# Patient Record
Sex: FEMALE | Race: WHITE | NOT HISPANIC OR LATINO | Employment: STUDENT | URBAN - METROPOLITAN AREA
[De-identification: names, ages, dates, MRNs, and addresses within clinical notes are randomized per-mention and may not be internally consistent; named-entity substitution may affect disease eponyms.]

---
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
## 2020-10-05 ENCOUNTER — PATIENT MESSAGE (OUTPATIENT)
Dept: ADMINISTRATIVE | Facility: HOSPITAL | Age: 20
End: 2020-10-05

## 2021-01-04 ENCOUNTER — PATIENT MESSAGE (OUTPATIENT)
Dept: ADMINISTRATIVE | Facility: HOSPITAL | Age: 21
End: 2021-01-04

## 2021-04-05 ENCOUNTER — PATIENT MESSAGE (OUTPATIENT)
Dept: ADMINISTRATIVE | Facility: HOSPITAL | Age: 21
End: 2021-04-05

## 2021-07-07 ENCOUNTER — PATIENT MESSAGE (OUTPATIENT)
Dept: ADMINISTRATIVE | Facility: HOSPITAL | Age: 21
End: 2021-07-07

## 2021-08-20 ENCOUNTER — LAB VISIT (OUTPATIENT)
Dept: PRIMARY CARE CLINIC | Facility: OTHER | Age: 21
End: 2021-08-20
Payer: OTHER GOVERNMENT

## 2021-08-20 DIAGNOSIS — R05.9 COUGH: ICD-10-CM

## 2021-08-20 PROCEDURE — U0003 INFECTIOUS AGENT DETECTION BY NUCLEIC ACID (DNA OR RNA); SEVERE ACUTE RESPIRATORY SYNDROME CORONAVIRUS 2 (SARS-COV-2) (CORONAVIRUS DISEASE [COVID-19]), AMPLIFIED PROBE TECHNIQUE, MAKING USE OF HIGH THROUGHPUT TECHNOLOGIES AS DESCRIBED BY CMS-2020-01-R: HCPCS | Performed by: INTERNAL MEDICINE

## 2021-08-23 LAB
SARS-COV-2 RNA RESP QL NAA+PROBE: NOT DETECTED
SARS-COV-2- CYCLE NUMBER: NORMAL

## 2021-10-04 ENCOUNTER — PATIENT MESSAGE (OUTPATIENT)
Dept: ADMINISTRATIVE | Facility: HOSPITAL | Age: 21
End: 2021-10-04

## 2022-11-21 ENCOUNTER — TELEPHONE (OUTPATIENT)
Dept: FAMILY MEDICINE | Facility: CLINIC | Age: 22
End: 2022-11-21
Payer: COMMERCIAL

## 2022-11-21 NOTE — TELEPHONE ENCOUNTER
----- Message from Federica Glover sent at 11/21/2022  3:47 PM CST -----  Regarding: Patient Call Back  .Type: Patient Call Back    Who called: self     What is the request in detail: would like to establish care with Dr Delacruz- nothing in epic shows appts. Wife sees physician and would like to establish with same dr.  Please call  - sore throat as well and would like to be seen     Can the clinic reply by [facility]?     Would the patient rather a call back or a response via [facility]?  Call     Best call back number: [phone]

## 2022-11-23 ENCOUNTER — OFFICE VISIT (OUTPATIENT)
Dept: FAMILY MEDICINE | Facility: CLINIC | Age: 22
End: 2022-11-23
Payer: OTHER GOVERNMENT

## 2022-11-23 DIAGNOSIS — J02.9 SORE THROAT: ICD-10-CM

## 2022-11-23 DIAGNOSIS — Z76.89 ESTABLISHING CARE WITH NEW DOCTOR, ENCOUNTER FOR: Primary | ICD-10-CM

## 2022-11-23 DIAGNOSIS — R09.81 SINUS CONGESTION: ICD-10-CM

## 2022-11-23 PROCEDURE — 99203 OFFICE O/P NEW LOW 30 MIN: CPT | Mod: 95,,, | Performed by: INTERNAL MEDICINE

## 2022-11-23 PROCEDURE — 99203 PR OFFICE/OUTPT VISIT, NEW, LEVL III, 30-44 MIN: ICD-10-PCS | Mod: 95,,, | Performed by: INTERNAL MEDICINE

## 2022-11-23 NOTE — PROGRESS NOTES
Patient ID: Ewa Welch is a pleasant 22 y.o. White female.    Chief Complaint: Establish Care and Sore Throat      Patient is a new pt to me and to our practice.     This visit is a Telemedicine Video Visit due to the COVID-19 epidemics.    The patient location is: [place]  The chief complaint leading to consultation is: sore throat and establish care    Visit type: audiovisual    Face to Face time with patient: 10 min   20 minutes of total time spent on the encounter, which includes face to face time and non-face to face time preparing to see the patient (eg, review of tests), Obtaining and/or reviewing separately obtained history, Documenting clinical information in the electronic or other health record, Independently interpreting results (not separately reported) and communicating results to the patient/family/caregiver, or Care coordination (not separately reported).         Each patient to whom he or she provides medical services by telemedicine is:  (1) informed of the relationship between the physician and patient and the respective role of any other health care provider with respect to management of the patient; and (2) notified that he or she may decline to receive medical services by telemedicine and may withdraw from such care at any time.    HPI     Comes today to establish care with a new PCP, her wife is my patient too and referred her to my practice.    She has been in the [employer] for the last 2 years and now is back to [place].    She reports being globally healthy, however yesterday she was seen at an Urgent Care for sinus infection.  She also had a sore throat.  She was provided with Z-Errol and Flonase, she feels pretty well today.    She has no fever, no shortness of breath, no chest pain, she tested negative for COVID and flu.      Patient Active Problem List   Diagnosis    Seasonal allergic rhinitis due to pollen    Chlamydia infection        Review of Systems   Constitutional:  Negative for  activity change and unexpected weight change.   HENT:  Positive for congestion (better) and sore throat. Negative for hearing loss and rhinorrhea.    Eyes:  Negative for discharge and visual disturbance.   Respiratory:  Negative for chest tightness and wheezing.    Cardiovascular:  Negative for chest pain and palpitations.   Gastrointestinal:  Negative for blood in stool, constipation, diarrhea and vomiting.   Endocrine: Negative for polydipsia and polyuria.   Genitourinary:  Negative for difficulty urinating, dysuria, hematuria and menstrual problem.   Musculoskeletal:  Negative for arthralgias and joint swelling.   Neurological:  Negative for weakness and headaches.   Psychiatric/Behavioral:  Negative for confusion and dysphoric mood.      Objective:      Physical Exam    There were no vitals filed for this visit.  There is no height or weight on file to calculate BMI.    Pleasant, smiling, NAD.    RESULTS: Reviewed labs from last 12 months    Last Lab Results:     Lab Results   Component Value Date    WBC 6.08 12/21/2018    HGB 12.7 12/21/2018    HCT 38.4 12/21/2018     12/21/2018     12/21/2018    K 3.8 12/21/2018     12/21/2018    CO2 25 12/21/2018    BUN 10 12/21/2018    CREATININE 0.8 12/21/2018    CALCIUM 9.9 12/21/2018    ALBUMIN 4.9 (H) 12/21/2018    AST 12 12/21/2018    ALT 9 (L) 12/21/2018    CHOL 142 12/21/2018    TRIG 55 12/21/2018    HDL 52 12/21/2018    LDLCALC 79.0 12/21/2018    HGBA1C 4.5 12/21/2018    TSH 1.774 12/21/2018       Assessment:       1. Establishing care with new doctor, encounter for    2. Sore throat    3. Sinus congestion        Plan:   Ewa was seen today for establish care and sore throat.    Diagnoses and all orders for this visit:    Establishing care with new doctor, encounter for    Discussed the importance of a good pt-doctor trust relationship. Will need an OV to do a full physical. Pt will schedule this.    Sore throat    See HPI. Better.    Sinus  congestion    See HPI. Better.    No follow-ups on file.    This note was created by combination of typed  and M-Modal dictation.  Transcription errors may be present.  If there are any questions, please contact me.

## 2023-04-11 ENCOUNTER — TELEPHONE (OUTPATIENT)
Dept: FAMILY MEDICINE | Facility: CLINIC | Age: 23
End: 2023-04-11

## 2023-04-11 DIAGNOSIS — M25.551 PAIN OF RIGHT HIP: Primary | ICD-10-CM

## 2023-04-11 DIAGNOSIS — G89.29 CHRONIC RIGHT HIP PAIN: Primary | ICD-10-CM

## 2023-04-11 DIAGNOSIS — M25.551 CHRONIC RIGHT HIP PAIN: Primary | ICD-10-CM

## 2023-04-11 NOTE — TELEPHONE ENCOUNTER
Right hip pain, no trauma. But it is a chronic issue over the past few years, states it's reason she was d/c from the [employer].

## 2023-04-11 NOTE — TELEPHONE ENCOUNTER
Requesting referral to Ortho due to R hip pain that has been bothering her for some time, reason why she was D/C from [employer]. Referral placed

## 2023-04-11 NOTE — TELEPHONE ENCOUNTER
----- Message from Tika Delacruz MD sent at 4/11/2023 11:59 AM CDT -----  Regarding: RE: Referral  Need to know more about hip issue. R or L? Initial trauma? Thanks    ----- Message -----  From: Miroslava Frausto  Sent: 4/10/2023   2:07 PM CDT  To: Tika Delacruz MD  Subject: Referral                                         .Type:  Patient Requesting Referral    Who Called:self    Referral to What Specialty: Hip     Reason for Referral:hip issues since      Does the patient want the referral with a specific physician?: no    [facility] the [facility]    Would the patient rather a call back or a response via [facility]? Call     Best Call Back Number: [phone]     Additional Information:

## 2023-04-13 ENCOUNTER — OFFICE VISIT (OUTPATIENT)
Dept: ORTHOPEDICS | Facility: CLINIC | Age: 23
End: 2023-04-13
Payer: OTHER GOVERNMENT

## 2023-04-13 VITALS — BODY MASS INDEX: 18.96 KG/M2 | HEIGHT: 66 IN | WEIGHT: 118 LBS

## 2023-04-13 DIAGNOSIS — G89.29 CHRONIC RIGHT HIP PAIN: ICD-10-CM

## 2023-04-13 DIAGNOSIS — S73.191A TEAR OF RIGHT ACETABULAR LABRUM, INITIAL ENCOUNTER: ICD-10-CM

## 2023-04-13 DIAGNOSIS — M25.551 CHRONIC RIGHT HIP PAIN: ICD-10-CM

## 2023-04-13 DIAGNOSIS — M25.551 PAIN OF RIGHT HIP: Primary | ICD-10-CM

## 2023-04-13 PROCEDURE — 99999 PR PBB SHADOW E&M-EST. PATIENT-LVL III: ICD-10-PCS | Mod: PBBFAC,,, | Performed by: ORTHOPAEDIC SURGERY

## 2023-04-13 PROCEDURE — 99203 OFFICE O/P NEW LOW 30 MIN: CPT | Mod: S$PBB,,, | Performed by: ORTHOPAEDIC SURGERY

## 2023-04-13 PROCEDURE — 99203 PR OFFICE/OUTPT VISIT, NEW, LEVL III, 30-44 MIN: ICD-10-PCS | Mod: S$PBB,,, | Performed by: ORTHOPAEDIC SURGERY

## 2023-04-13 PROCEDURE — 99213 OFFICE O/P EST LOW 20 MIN: CPT | Mod: PBBFAC,PN | Performed by: ORTHOPAEDIC SURGERY

## 2023-04-13 PROCEDURE — 99999 PR PBB SHADOW E&M-EST. PATIENT-LVL III: CPT | Mod: PBBFAC,,, | Performed by: ORTHOPAEDIC SURGERY

## 2023-04-13 NOTE — PROGRESS NOTES
NEW PATIENT ORTHOPAEDIC: HIP    PRIMARY CARE PHYSICIAN: Tika Delacruz MD   REFERRING PROVIDER: Tika Delacruz MD  [address] [place] [address]     ASSESSMENT & PLAN:    Impression:  Right Hip Impingement  Right Hip Labral Tear    Follow Up Plan: After MRI    Non operative care:    Ewa Welch has physical exam evidence of above and wishes to pursue an non-operative care. I am recommending the following: MRI Hip    Patient comes in with a long history of having right hip pain.  She is had this pain for over 3 years.  It is led to her getting discharged from the [employer].  She had extensive workup prior to this discharge from active service.  She reports that she had MRIs, MRI arthrograms, lateral based hip injections, physical therapy, athletic trainers, anti-inflammatories without official diagnosis or improvement in her pains.  The pain she describes is a groin and posterior gluteal pain in a C sign distribution.  The pain is positional and made worse with internal rotation and flexion in his relieved with external rotation.  She also has pain over her iliopsoas tendon and pain with resisted hip flexion.  These pains are debilitating she has not inability to walk or run without pain in her hip.  She was unable to seek care earlier after  discharge secondary to lack of insurance.  Her radiographs are not consistent with any focal abnormality.  I think best next step is to reimage her hip with an MRI to eval for impingement signs although she does not have pincer lesion or cam deformity on radiographs.  She acts like she has a labral tear and I think it is best to evaluate this with MRI.  Pending that MRI she could consider diagnostic and therapeutic intra-articular injection which she is not tried previously.  Could also consider referral to sports medicine for consideration of hip arthroscopy.    The patient has been ordered:  MRI (Criteria for MRI - xray is  equivocal)    CONSULTS:   None    ACTIVE PROBLEM LIST  Patient Active Problem List   Diagnosis    Seasonal allergic rhinitis due to pollen    Chlamydia infection        SUBJECTIVE    CHIEF COMPLAINT: Hip Pain    HPI:   Ewa Welch is a 22 y.o. female here for evaluation and management of right hip pain. There is not a specific incident that brought about this pain. she has had progressive problems with the hip(s) starting 3 years ago and is progressing which is now interfering with activities which include: walking, functional household ADL's, participating in family activities, enjoying hobbies, exercise, rising from a sitting position, standing for prolonged periods of time, getting in and out of a car, and climbing stairs     Currently the pain in the joint is moderate with activity.  The pain is intermittent and is located in buttocks and groin.  The pain is described as aching, severe, sharp, and shooting . Relieving factors include rest, ice or heat, prescription medication, and repositioning. yes associated Catching, Clicking, and Popping.     They do not report leg length inequality.     Ewa Welch has no additional complaints.     PROGRESSIVE SYMPTOMS:  Pain impacting work  Pain worsened by weight bearing  Pain effecting living situation  Unable to ambulate a significant distance without pain    FUNCTIONAL STATUS:   Climb a flight of stairs or walk up a hill     PREVIOUS TREATMENTS:  Medical: RX NSAIDS, Steroid Injections, and Tylenol  Physical Therapy: Activities Modified , Formal Physical Therapy for 6 weeks, and Home Exercise Program  Previous Orthopaedic Surgery: None    REVIEW OF SYSTEMS:  PAIN ASSESSMENT:  See HPI.  MUSCULOSKELETAL: See HPI.  OTHER 10 point review of systems is negative except as stated in HPI above    PAST MEDICAL HISTORY   has a past medical history of Generalized anxiety disorder (03/29/2016), Infected pilonidal cyst (4/5/2017), Seasonal allergic rhinitis due to pollen (4/4/2013),  "Self-harming behavior, and Tonsillar hypertrophy (2/12/2018).     PAST SURGICAL HISTORY   has a past surgical history that includes OTHER SURGICAL HISTORY (Left); Tonsillectomy (02/12/2018); Adenoidectomy (02/12/2018); and Ridge tooth extraction (Bilateral).     FAMILY HISTORY  family history includes Allergies in her mother; Cancer in her paternal grandmother; Cirrhosis in her paternal grandmother; Eczema in her sister; No Known Problems in her father.     SOCIAL HISTORY   reports that she has never smoked. She has never used smokeless tobacco. She reports current alcohol use. She reports that she does not use drugs.     ALLERGIES   Review of patient's allergies indicates:   Allergen Reactions    Latex, natural rubber Rash        MEDICATIONS   Current Outpatient Medications on File Prior to Visit   Medication Sig Dispense Refill    fluticasone (FLONASE) 50 mcg/actuation nasal spray 1 spray (50 mcg total) by Each Nare route once daily. 16 g 11    loratadine (CLARITIN) 10 mg tablet Take 1 tablet (10 mg total) by mouth once daily. (Patient taking differently: Take 10 mg by mouth daily as needed. ) 30 tablet 3     No current facility-administered medications on file prior to visit.          PHYSICAL EXAM   height is 5' 6" (1.676 m) and weight is 53.5 kg (118 lb).   Body mass index is 19.05 kg/m².      All other systems deferred.  GENERAL:  No acute distress  HABITUS: Normal  GAIT: Non-antalgic  SKIN: Normal     HIP EXAM:    right:   ROM:   Flexion: 120 degrees    Internal Rotation: 30 degrees    External Rotation: 50 degrees    Abduction: 45 degrees    Adduction: 20 degrees  No apparent leg length discrepancy    Palpation: yes tenderness over Ilioposas tendon   Pain is reproduced with IR and not ER of the hip  Positive FADIR  Pain with resisted hip flexion  Strength: 4/5 hip flexion, abduction, 5/5 knee flexion and extension   Straight leg raise: Negative   Neurovascular Status: Sensation intact to light touch in " Sural, saphenous, SPN, DPN, Tibial nerve distribution  2+ pulse DP/PT, normal capillary refill, foot has normal warmth    DATA:  Diagnostic tests reviewed for today's visit:    Hip radiographs appears normal. No evidence of fracture, osseous abnormalities, or significant degenerative changes.

## 2023-04-15 ENCOUNTER — HOSPITAL ENCOUNTER (OUTPATIENT)
Dept: RADIOLOGY | Facility: OTHER | Age: 23
Discharge: HOME | End: 2023-04-15
Attending: ORTHOPAEDIC SURGERY
Payer: OTHER GOVERNMENT

## 2023-04-15 DIAGNOSIS — S73.191A TEAR OF RIGHT ACETABULAR LABRUM, INITIAL ENCOUNTER: ICD-10-CM

## 2023-04-15 PROCEDURE — 73721 MRI JNT OF LWR EXTRE W/O DYE: CPT | Mod: TC,RT

## 2023-04-15 PROCEDURE — 73721 MRI HIP WITHOUT CONTRAST RIGHT: ICD-10-PCS | Mod: 26,RT,, | Performed by: RADIOLOGY

## 2023-04-15 PROCEDURE — 73721 MRI JNT OF LWR EXTRE W/O DYE: CPT | Mod: 26,RT,, | Performed by: RADIOLOGY

## 2023-04-18 ENCOUNTER — PATIENT MESSAGE (OUTPATIENT)
Dept: ORTHOPEDICS | Facility: CLINIC | Age: 23
End: 2023-04-18
Payer: OTHER GOVERNMENT

## 2023-04-18 DIAGNOSIS — S73.191A TEAR OF RIGHT ACETABULAR LABRUM, INITIAL ENCOUNTER: Primary | ICD-10-CM

## 2023-04-19 ENCOUNTER — TELEPHONE (OUTPATIENT)
Dept: SPORTS MEDICINE | Facility: CLINIC | Age: 23
End: 2023-04-19
Payer: OTHER GOVERNMENT

## 2023-04-27 ENCOUNTER — OFFICE VISIT (OUTPATIENT)
Dept: SPORTS MEDICINE | Facility: CLINIC | Age: 23
End: 2023-04-27
Payer: OTHER GOVERNMENT

## 2023-04-27 ENCOUNTER — HOSPITAL ENCOUNTER (OUTPATIENT)
Dept: RADIOLOGY | Facility: HOSPITAL | Age: 23
Discharge: HOME | End: 2023-04-27
Attending: STUDENT IN AN ORGANIZED HEALTH CARE EDUCATION/TRAINING PROGRAM
Payer: OTHER GOVERNMENT

## 2023-04-27 VITALS
HEART RATE: 74 BPM | BODY MASS INDEX: 21.86 KG/M2 | HEIGHT: 66 IN | DIASTOLIC BLOOD PRESSURE: 73 MMHG | WEIGHT: 136 LBS | SYSTOLIC BLOOD PRESSURE: 127 MMHG

## 2023-04-27 DIAGNOSIS — S73.191A TEAR OF RIGHT ACETABULAR LABRUM, INITIAL ENCOUNTER: ICD-10-CM

## 2023-04-27 DIAGNOSIS — S73.191A TEAR OF RIGHT ACETABULAR LABRUM, INITIAL ENCOUNTER: Primary | ICD-10-CM

## 2023-04-27 DIAGNOSIS — M76.31 ILIOTIBIAL BAND SYNDROME OF RIGHT SIDE: ICD-10-CM

## 2023-04-27 DIAGNOSIS — M25.851 FEMOROACETABULAR IMPINGEMENT OF RIGHT HIP: ICD-10-CM

## 2023-04-27 PROCEDURE — 99999 PR PBB SHADOW E&M-EST. PATIENT-LVL IV: CPT | Mod: PBBFAC,,, | Performed by: STUDENT IN AN ORGANIZED HEALTH CARE EDUCATION/TRAINING PROGRAM

## 2023-04-27 PROCEDURE — 99214 OFFICE O/P EST MOD 30 MIN: CPT | Mod: PBBFAC,27,25 | Performed by: STUDENT IN AN ORGANIZED HEALTH CARE EDUCATION/TRAINING PROGRAM

## 2023-04-27 PROCEDURE — 20611 DRAIN/INJ JOINT/BURSA W/US: CPT | Mod: PBBFAC | Performed by: STUDENT IN AN ORGANIZED HEALTH CARE EDUCATION/TRAINING PROGRAM

## 2023-04-27 PROCEDURE — 99204 PR OFFICE/OUTPT VISIT, NEW, LEVL IV, 45-59 MIN: ICD-10-PCS | Mod: S$PBB,,, | Performed by: STUDENT IN AN ORGANIZED HEALTH CARE EDUCATION/TRAINING PROGRAM

## 2023-04-27 PROCEDURE — 99999 PR PBB SHADOW E&M-EST. PATIENT-LVL II: CPT | Mod: PBBFAC,,, | Performed by: STUDENT IN AN ORGANIZED HEALTH CARE EDUCATION/TRAINING PROGRAM

## 2023-04-27 PROCEDURE — 99499 NO LOS: ICD-10-PCS | Mod: S$PBB,,, | Performed by: STUDENT IN AN ORGANIZED HEALTH CARE EDUCATION/TRAINING PROGRAM

## 2023-04-27 PROCEDURE — 73502 X-RAY EXAM HIP UNI 2-3 VIEWS: CPT | Mod: 26,RT,, | Performed by: RADIOLOGY

## 2023-04-27 PROCEDURE — 99212 OFFICE O/P EST SF 10 MIN: CPT | Mod: PBBFAC,25 | Performed by: STUDENT IN AN ORGANIZED HEALTH CARE EDUCATION/TRAINING PROGRAM

## 2023-04-27 PROCEDURE — 99499 UNLISTED E&M SERVICE: CPT | Mod: S$PBB,,, | Performed by: STUDENT IN AN ORGANIZED HEALTH CARE EDUCATION/TRAINING PROGRAM

## 2023-04-27 PROCEDURE — 20611 LARGE JOINT ASPIRATION/INJECTION: R HIP JOINT: ICD-10-PCS | Mod: S$PBB,RT,, | Performed by: STUDENT IN AN ORGANIZED HEALTH CARE EDUCATION/TRAINING PROGRAM

## 2023-04-27 PROCEDURE — 99499 NO LOS: ICD-10-PCS | Mod: S$PBB,,, | Performed by: FAMILY MEDICINE

## 2023-04-27 PROCEDURE — 99999 PR PBB SHADOW E&M-EST. PATIENT-LVL II: ICD-10-PCS | Mod: PBBFAC,,, | Performed by: STUDENT IN AN ORGANIZED HEALTH CARE EDUCATION/TRAINING PROGRAM

## 2023-04-27 PROCEDURE — 73502 XR HIP WITH PELVIS WHEN PERFORMED, 2 OR 3  VIEWS RIGHT: ICD-10-PCS | Mod: 26,RT,, | Performed by: RADIOLOGY

## 2023-04-27 PROCEDURE — 99999 PR PBB SHADOW E&M-EST. PATIENT-LVL IV: ICD-10-PCS | Mod: PBBFAC,,, | Performed by: STUDENT IN AN ORGANIZED HEALTH CARE EDUCATION/TRAINING PROGRAM

## 2023-04-27 PROCEDURE — 73502 X-RAY EXAM HIP UNI 2-3 VIEWS: CPT | Mod: TC,RT

## 2023-04-27 PROCEDURE — 99499 UNLISTED E&M SERVICE: CPT | Mod: S$PBB,,, | Performed by: FAMILY MEDICINE

## 2023-04-27 PROCEDURE — 99204 OFFICE O/P NEW MOD 45 MIN: CPT | Mod: S$PBB,,, | Performed by: STUDENT IN AN ORGANIZED HEALTH CARE EDUCATION/TRAINING PROGRAM

## 2023-04-27 NOTE — PROGRESS NOTES
Subjective:          Chief Complaint: Ewa Welch is a 22 y.o. female who had concerns including Pain of the Right Hip.    Ewa Welch is a 22 y.o. female [employer] and former [employer] that presents for evaluation for her right hip pain. She is referred by Dr. Hubbard. She states that her pain started about 3-4 years ago while training in the .  There is no specific inciting injury, event, or trauma.  She rates her pain as a 9/10 at worst over the anterior groin as well as the lateral aspect of the right hip. She endorses a positive C-sign.  The pain increases with hip flexion.  She reports having catching and popping sensations in the hip after long periods of sitting. She has completed multiple courses of physical therapy while in the  with no relief. She also states that she has received an injection to the lateral aspect of the hip with some relief.     Past Medical History:   Diagnosis Date    Generalized anxiety disorder 03/29/2016    Infected pilonidal cyst 4/5/2017    Seasonal allergic rhinitis due to pollen 4/4/2013    Self-harming behavior     Tonsillar hypertrophy 2/12/2018       Current Outpatient Medications on File Prior to Visit   Medication Sig Dispense Refill    fluticasone (FLONASE) 50 mcg/actuation nasal spray 1 spray (50 mcg total) by Each Nare route once daily. 16 g 11    loratadine (CLARITIN) 10 mg tablet Take 1 tablet (10 mg total) by mouth once daily. (Patient taking differently: Take 10 mg by mouth daily as needed. ) 30 tablet 3     No current facility-administered medications on file prior to visit.       Past Surgical History:   Procedure Laterality Date    ADENOIDECTOMY  02/12/2018    Dr. Bernard    OTHER SURGICAL HISTORY Left     toenail removal big toe    TONSILLECTOMY  02/12/2018    Dr. Bernard    WISDOM TOOTH EXTRACTION Bilateral        Family History   Problem Relation Age of Onset    Allergies Mother     Eczema Sister     Cancer Paternal Grandmother      Cirrhosis Paternal Grandmother     No Known Problems Father        Social History     Socioeconomic History    Marital status:    Tobacco Use    Smoking status: Never    Smokeless tobacco: Never   Substance and Sexual Activity    Alcohol use: Yes     Comment: Occasional    Drug use: No    Sexual activity: Yes     Partners: Female, Male   Social History Narrative    Lives with mom, dad and sister Erika.    She is planning to go to the [place] next year.    Sexually relationship with female.              Review of Systems   Constitutional: Negative.   HENT: Negative.     Eyes: Negative.    Cardiovascular: Negative.    Respiratory: Negative.     Endocrine: Negative.    Hematologic/Lymphatic: Negative.    Skin: Negative.    Musculoskeletal:  Positive for joint pain (right hip) and muscle weakness. Negative for arthritis, falls, gout, joint swelling, muscle cramps, myalgias, neck pain and stiffness.   Neurological: Negative.    Psychiatric/Behavioral: Negative.     Allergic/Immunologic: Negative.                  Objective:        General: Ewa is well-developed, well-nourished, appears stated age, in no acute distress, alert and oriented to time, place and person.     General    Nursing note and vitals reviewed.  Constitutional: She is oriented to person, place, and time. She appears well-developed and well-nourished. No distress.   HENT:   Head: Normocephalic and atraumatic.   Nose: Nose normal.   Eyes: EOM are normal.   Cardiovascular:  Intact distal pulses.            Pulmonary/Chest: Effort normal. No respiratory distress.   Neurological: She is alert and oriented to person, place, and time.   Psychiatric: She has a normal mood and affect. Her behavior is normal. Judgment and thought content normal.           Right Knee Exam     Inspection   Alignment:  normal  Effusion: absent    Left Knee Exam     Inspection   Alignment:  normal  Effusion: absent    Right Hip Exam     Inspection   Scars:  absent  Swelling: absent  Bruising: absent  No deformity of hip.  Quadriceps Atrophy:  Negative  Erythema: absent    Tenderness   The patient tender to palpation of the trochanteric bursa and rectus insertion.    Range of Motion   Abduction:  45   Adduction:  30   Extension:  0   Flexion:  100   External rotation:  70   Internal rotation:  15     Tests   Pain w/ forced internal rotation (LIZETTE): absent  Pain w/ forced external rotation (FADIR): present  Cynthia: negative  Circumduction test: negative  Stinchfield test: positive  Log Roll: negative  Snapping Hip (internal): negative    Other   Sensation: normal  Left Hip Exam     Inspection   Scars: absent  Swelling: absent  No deformity of hip.  Quadriceps Atrophy:  negative  Erythema: absent  Bruising: absent    Range of Motion   Abduction:  45   Adduction:  30   Extension:  0   Flexion:  120   External rotation:  70   Internal rotation: 30     Tests   Pain w/ forced internal rotation (LIZETTE): absent  Pain w/ forced external rotation (FADIR): absent  Cynthia: negative  Trendelenburg Test: negative  Circumduction test: negative  Stinchfield test: negative  Log Roll: negative  Snapping Hip (internal): negative    Other   Sensation: normal          Muscle Strength   Right Lower Extremity   Hip Abduction: 4/5   Hip Adduction: 5/5   Hip Flexion: 4/5   Ankle Dorsiflexion:  5/5   Left Lower Extremity   Hip Abduction: 5/5   Hip Adduction: 5/5   Hip Flexion: 5/5   Ankle Dorsiflexion:  5/5     Vascular Exam     Right Pulses  Dorsalis Pedis:      2+  Posterior Tibial:      2+        Left Pulses  Dorsalis Pedis:      2+  Posterior Tibial:      2+        Capillary Refill  Left Hand: normal capillary refill        Edema  Right Upper Leg: absent  Left Upper Leg: absent      Imaging:  X-rays of the right hip from 04/27/2023 personally viewed by me on that day.  These include AP pelvis, AP right hip, and bilateral modified Grigsby.  Bilateral cam deformities.  There is no crossover sign,  retroversion, or dysplasia.  No arthritic changes.      MRI of the right hip from 04/15/2023 personally viewed by me 04/27/2023.  This is a non arthrogram study.  There is an acetabular labral tear in the anterior and superior aspect of the hip.  There is a cam impingement.  There is no cartilage damage.      Assessment:     Ewa Welch is a 22 y.o. female with right hip labral tear, femoroacetabular impingement, as well as IT band syndrome/trochanteric bursitis  Encounter Diagnoses   Name Primary?    Tear of right acetabular labrum, initial encounter Yes    Iliotibial band syndrome of right side     Femoroacetabular impingement of right hip           Plan:       The diagnosis and treatment options were discussed at length with the patient and all of her questions were answered.  I showed her the x-rays and the MRI and discuss the findings with her.  She is done an extensive course of physical therapy including a single trochanteric bursa injection which did provide some relief with the lateral pain but not the medial.  I recommended we do a diagnostic intra-articular hip injection today.  I will have Dr. Rudd perform this.  Additionally, we will refer to physical therapy to work on hip, core, and gluteal strengthening as well as stretching to the IT band including soft tissue modalities such as Graston and dry needling.  She is in agreement with this plan.  She will return to clinic in 6 weeks for repeat evaluation.  If she does not have significant improvement with physical therapy, but does receive some relief with the intra-articular injection, we can consider hip arthroscopy with labral repair and osteoplasty of the femur.      All of their questions were answered.  They will call the clinic with any questions or concerns in the interim.    Should the patient's symptoms worsen, persist, or fail to improve they should return for reevaluation and I would be happy to see them back anytime.        Leonard  TRISTIN Becker.    Please be aware that this note has been generated with the assistance of Kira voice-to-text.  Please excuse any spelling or grammatical errors.    Thank you for choosing Dr. Leonard Becker for your sports medicine care. It is our goal to provide you with exceptional care that will help keep you healthy, active, and get you back in the game.     If you felt that you received exemplary care today, please consider leaving feedback for Dr. Becker on [employer] at [email or web address].    Please do not hesitate to reach out to us via email, phone, or MyChart with any questions, concerns, or feedback.

## 2023-04-27 NOTE — PROCEDURES
Large Joint Aspiration/Injection: R hip joint    Date/Time: 4/27/2023 11:15 AM  Performed by: Lilly Rudd MD  Authorized by: Lilly Rudd MD     Consent Done?:  Yes (Verbal)  Indications:  Pain and diagnostic evaluation  Site marked: the procedure site was marked    Timeout: prior to procedure the correct patient, procedure, and site was verified    Prep: patient was prepped and draped in usual sterile fashion      Local anesthesia used?: Yes    Anesthesia:  Local infiltration  Local anesthetic:  Co-phenylcaine spray and lidocaine 2% without epinephrine  Anesthetic total (ml):  2.5      Details:  Needle Size:  22 G  Ultrasonic Guidance for needle placement?: Yes    Images are saved and documented.  Approach:  Anterior  Location:  Hip  Site:  R hip joint  Patient tolerance:  Patient tolerated the procedure well with no immediate complications     TECHNIQUE: Real time ultrasound examination of the right femoroacetabular joint (s) was performed with SonGogii Gameste Edge 2, C1-5 MHz probe(s). Ultrasound guidance was used for needle localization. Images were saved and stored for documentation. Dynamic visualization of the needle was continuous throughout the procedures and maintained in good position.

## 2023-04-27 NOTE — PROGRESS NOTES
CC: right hip pain    22 y.o. Female presents today for local injection of her right hip joint.       REVIEW OF SYSTEMS:   Constitution: Patient denies fever or chills.  Eyes: Patient denies eye pain or vision changes.  HEENT: Patient denies ear pain, sore throat, or nasal discharge.  CVS: Patient denies chest pain.  Lungs: Patient denies shortness of breath or cough.  Skin: Patient denies skin rash or itching.    Musculoskeletal: Patient denies recent falls. See HPI.  Psych: Patient denies any current anxiety or nervousness.    PAST MEDICAL HISTORY:   Past Medical History:   Diagnosis Date    Generalized anxiety disorder 03/29/2016    Infected pilonidal cyst 4/5/2017    Seasonal allergic rhinitis due to pollen 4/4/2013    Self-harming behavior     Tonsillar hypertrophy 2/12/2018       MEDICATIONS:     Current Outpatient Medications:     fluticasone (FLONASE) 50 mcg/actuation nasal spray, 1 spray (50 mcg total) by Each Nare route once daily., Disp: 16 g, Rfl: 11    loratadine (CLARITIN) 10 mg tablet, Take 1 tablet (10 mg total) by mouth once daily. (Patient taking differently: Take 10 mg by mouth daily as needed. ), Disp: 30 tablet, Rfl: 3    ALLERGIES:   Review of patient's allergies indicates:   Allergen Reactions    Latex, natural rubber Rash        PHYSICAL EXAMINATION:  There were no vitals taken for this visit.  There are no signs of infection at the injection site, including no rubor, calor, or skin lesions.  Gen: NAD.  Psych: Affect & judgment nl.  Neuro: Grossly CNI. ENRIQUEZ.  HEENT: -Trach dev. -Eye d/c.   CV: Color nl. -E/C/C. WWPx4.  Pulm: -Dyspnea. -Cough.  Lymph: -Edema.  Int: -Rash/lesion noted. Skin is warm and dry    ASSESSMENT:      ICD-10-CM ICD-9-CM   1. Tear of right acetabular labrum, initial encounter  S73.191A 843.8         PLAN:  Ultrasound-guided injection of the right hip joint with local anesthetic performed at visit today.    Risks and benefits were discussed with patient prior to receiving  injection.  Depending on injection type, risks include the possibility of infection, pain, disruptions in blood pressure and blood sugar, and cosmetic deformity at site of injection.    All questions were answered to the best of my ability and all concerns were addressed at this time.    This note is dictated using the M*Modal Fluency Direct word recognition program. There are word recognition mistakes that are occasionally missed on review.

## 2023-05-05 ENCOUNTER — CLINICAL SUPPORT (OUTPATIENT)
Dept: REHABILITATION | Facility: HOSPITAL | Age: 23
End: 2023-05-05
Attending: STUDENT IN AN ORGANIZED HEALTH CARE EDUCATION/TRAINING PROGRAM
Payer: OTHER GOVERNMENT

## 2023-05-05 DIAGNOSIS — S73.191A TEAR OF RIGHT ACETABULAR LABRUM, INITIAL ENCOUNTER: ICD-10-CM

## 2023-05-05 DIAGNOSIS — M76.31 ILIOTIBIAL BAND SYNDROME OF RIGHT SIDE: ICD-10-CM

## 2023-05-05 DIAGNOSIS — M25.551 RIGHT HIP PAIN: Primary | ICD-10-CM

## 2023-05-05 PROCEDURE — 97110 THERAPEUTIC EXERCISES: CPT

## 2023-05-05 PROCEDURE — 97161 PT EVAL LOW COMPLEX 20 MIN: CPT

## 2023-05-05 NOTE — PLAN OF CARE
"[facility]  Physical Therapy Initial Evaluation    Name: Ewa Welch  Clinic Number: [id number]    Therapy Diagnosis:   Encounter Diagnoses   Name Primary?    Tear of right acetabular labrum, initial encounter     Iliotibial band syndrome of right side     Right hip pain Yes     Physician: Leonard Becker MD    Physician Orders: PT Eval and Treat   Medical Diagnosis from Referral:   S73.191A (ICD-10-CM) - Tear of right acetabular labrum, initial encounter   M76.31 (ICD-10-CM) - Iliotibial band syndrome of right side     Evaluation Date: 5/5/2023  Authorization Period Expiration: 12/31/23  Plan of Care Expiration: 6/30/23  Visit # / Visits authorized: 1/ 20    Time In: 800 am  Time Out: 855  Total Billable Time: 55 minutes    Precautions: Standard    Subjective   Date of onset: 2018  History of current condition - Ewa reports: first starting noticing right hip pain in 2018 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 She thinks having her drill instructors making her run with her pack on (80#) up and down stairs and on hills. 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 Pt notes she was then d/c from the [employer], so did not have insurance and was unable to be treated for her hip pain. Sometimes she can "pop" her hip, but that only gives her temporary relief. Prolonged positions (sitting, standing, driving, sleeping) make her symptoms worse. Pt referred to PT to improve R hip symptoms.        Past Medical History:   Diagnosis Date    Generalized anxiety disorder 03/29/2016    Infected pilonidal cyst 4/5/2017    Seasonal allergic rhinitis due to pollen 4/4/2013    Self-harming behavior     Tonsillar hypertrophy 2/12/2018     Ewa Welch  has a past surgical history that includes OTHER SURGICAL HISTORY (Left); Tonsillectomy (02/12/2018); Adenoidectomy (02/12/2018); and Jennings tooth extraction (Bilateral).    Ewa has a current " "medication list which includes the following prescription(s): fluticasone propionate and loratadine.    Review of patient's allergies indicates:   Allergen Reactions    Latex, natural rubber Rash        Imaging, MRI studies:Impression:     1. Trace linear fluid signal at the chondrolabral junction of the right posterior-superior acetabular labrum concerning for a partial-thickness chondrolabral separation/tear.  Questionable trace undersurface fraying of the anterior-superior segment.  Consider further characterization with a dedicated MR arthrogram.  2. Suspected partial-thickness chondrolabral separation/tear of the left posterior-superior acetabular labrum, not well evaluated on this exam.  3. No chondral loss.  Normal femoral head/neck junction.  No acetabular overcoverage or undercoverage.    Prior Therapy: yes  Social History: she lives with their spouse  Occupation:  (desk work)  Prior Level of Function: active lifestyles  Current Level of Function: limited with prolonged positions    Pain:  Current 6/10, worst 9/10, best 5/10   Location: right hip   Description: Aching, Throbbing, and Shooting  Aggravating Factors: Sitting, Standing, Walking, Night Time, and Lifting  Easing Factors: injection    Pts goals: "to get mobility back and improve LE work out/activities, running, sitting >10 min"    Objective   Observation: pt appears stated age, NAD      Posture: B genu varum, low arches      Gait: increased R hip ER    Range of Motion:  (PROM):  Hip Left Right   Flexion  (105) degrees  (105) degrees   Abduction  (30) degrees  (30) degrees   Ext Rotation  (38) degrees  (35) degrees   Int Rotation  (35) degrees  (30) degrees     Strength:  Hip Left Right   Flexion 5/5 5/5   Abduction 4/5 3+/5   Extension 3/5 3/5   External Rot 5/5 5/5     Knee Left Right   Extension 5/5 5/5   Flexion 3+/5 3+/5     Special Tests:  Hip Right   LIZETTE Negative   FADIR Positive   Active SLR Positive   Scour Positive "     Joint Mobility: P! With R hip IR    Palpation: TTP R hip flexors    Sensation: WNL BLE    Flexibility: mild tension R hip flexors    CMS Impairment/Limitation/Restriction for FOTO Hip Survey    Therapist reviewed FOTO scores for Ewa Welch on 5/5/2023.   FOTO documents entered into [facility] - see Media section.           TREATMENT   Treatment Time In: 830  Treatment Time Out: 855  Total Treatment time separate from Evaluation: 25 minutes    Ewa received therapeutic exercises to develop strength, endurance, and core stabilization for 25 minutes including:  Clamshells in neutral gtb x15 B with 5 sec hold  Crossed leg bridge x10 B  Dead bug BUE only holding yellowing med ball into flexion x10   FOTO  Pt education and HEP review      Home Exercises and Patient Education Provided    Education provided re: HEP to Go    Written Home Exercises Provided: yes.  Exercises were reviewed and Ewa was able to demonstrate them prior to the end of the session.   Pt received a written copy of exercises to perform at home. Ewa demonstrated good  understanding of the education provided.     See EMR under patient instructions for exercises given.   Assessment   Ewa is a 22 y.o. female referred to outpatient Physical Therapy with a medical diagnosis of R labral tear. Pt presents with slight decreased R hip IR and ER, weakness in B hips, + scour and FADIR and pain with prolonged postures limiting functional activities.     Pt prognosis is Good.   Pt will benefit from skilled outpatient Physical Therapy to address the deficits stated above and in the chart below, provide pt/family education, and to maximize pt's level of independence.     Plan of care discussed with patient: Yes  Pt's spiritual, cultural and educational needs considered and patient is agreeable to the plan of care and goals as stated below:     Anticipated Barriers for therapy: none    Medical Necessity is demonstrated by the following  History  Co-morbidities  and personal factors that may impact the plan of care Co-morbidities:   none    Personal Factors:   no deficits     low   Examination  Body Structures and Functions, activity limitations and participation restrictions that may impact the plan of care Body Regions:   lower extremities    Body Systems:    ROM  strength  balance  gait  motor control    Participation Restrictions:   Walking, sitting, driving    Activity limitations:   Learning and applying knowledge  no deficits    General Tasks and Commands  no deficits    Communication  no deficits    Mobility  lifting and carrying objects  walking  driving (bike, car, motorcycle)    Self care  no deficits    Domestic Life  doing house work (cleaning house, washing dishes, laundry)    Interactions/Relationships  no deficits    Life Areas  no deficits    Community and Social Life  no deficits         low   Clinical Presentation stable and uncomplicated low   Decision Making/ Complexity Score: low     Goals:  Short Term Goals: 4 weeks   - Pt will increase ROM to 35 R hip IR and ER  - Pt will increase strength to 4/5 B hip abd and ext  - Decrease Pain to 4/10 as worst with all PT interventions  - Pt to self correct posture and gait with minimal cues  - Pt independent with HEP with progressions.     Long Term Goals (8 Weeks):  - Pt will tolerate sitting for 20 min painfree  - Pt will increase strength to 5/5 B hip abd and ext  - Decrease Pain to 3/10 as worst with all functional activities  - Pt to return to 70% PLOF    Plan   Plan of care Certification: 5/5/2023 to 6/30/23.    Outpatient Physical Therapy 2 times weekly for 8 weeks to include the following interventions: Manual Therapy, Moist Heat/ Ice, Neuromuscular Re-ed, Patient Education, Therapeutic Activities, and Therapeutic Exercise.     Lennie Eckert, PT, DPT, OCS, COMT

## 2023-05-09 ENCOUNTER — CLINICAL SUPPORT (OUTPATIENT)
Dept: REHABILITATION | Facility: HOSPITAL | Age: 23
End: 2023-05-09
Payer: OTHER GOVERNMENT

## 2023-05-09 DIAGNOSIS — M25.551 RIGHT HIP PAIN: Primary | ICD-10-CM

## 2023-05-09 PROCEDURE — 97112 NEUROMUSCULAR REEDUCATION: CPT

## 2023-05-09 PROCEDURE — 97110 THERAPEUTIC EXERCISES: CPT

## 2023-05-09 NOTE — PROGRESS NOTES
Physical Therapy Daily Treatment Note     Name: Ewa Welch  [facility] Number: [id number]    Therapy Diagnosis:   Encounter Diagnosis   Name Primary?    Right hip pain Yes     Physician: Leonard Becker MD    Visit Date: 5/9/2023    Physician Orders: PT Eval and Treat   Medical Diagnosis from Referral:   S73.191A (ICD-10-CM) - Tear of right acetabular labrum, initial encounter   M76.31 (ICD-10-CM) - Iliotibial band syndrome of right side      Evaluation Date: 5/5/2023  Authorization Period Expiration: 12/31/23  Plan of Care Expiration: 6/30/23  Visit # / Visits authorized: 1/ 20     Time In: 800 am  Time Out: 855 am  Total Billable Time: 30 minutes     Precautions: Standard      Subjective      Pt reports:she is noticing her hip is more irritable than normal.    she was compliant with home exercise program given last session.   Response to previous treatment:NA  Functional change: NA    Pain: 7/10  Location: right hip      Objective     Ewa received therapeutic exercises to develop strength, endurance, ROM, flexibility, and core stabilization for 27 minutes including:  Clamshells in neutral gtb x15 B with 5 sec hold-np  Crossed leg bridge x10 B-np  Dead bug BUE only holding yellowing med ball into flexion x10 -np  Reformer hip add/HS 2x 20 sec hold B  Reformer hip flexor, heather's lunge, 2x 20 sec B  HSS on plinth 2x 1 min B  Pt education and HEP review       Ewa participated in neuromuscular re-education activities to improve: Balance, Coordination, Sense, and Proprioception for 20 minutes. The following activities were included:  Hip isometrics: YTB 5 x with 20 sec hold  - hip abd in hooklying  - SL clamshells   - SL hip IR    Ewa participated in dynamic functional therapeutic activities to improve functional performance for 8  minutes, including:  Bike x8 min R2        Home Exercises Provided and Patient Education Provided     Education provided:   - HEP to Go    Written Home  "Exercises Provided: Patient instructed to cont prior HEP.  Exercises were reviewed and Ewa was able to demonstrate them prior to the end of the session.  Ewa demonstrated good  understanding of the education provided.     See EMR under Patient Instructions for exercises provided {Belkis single:[id number]::"5/9/2023","prior visit"    Assessment     Pt treatment focused on minimizing hip irritability and demonstrated good understanding of isometrics which did bring her pain down to a 6/10 as her baseline. Plan to progress is improved noted next session.   Ewa Is progressing well towards her goals.   Pt prognosis is Good.     Pt will continue to benefit from skilled outpatient physical therapy to address the deficits listed in the problem list box on initial evaluation, provide pt/family education and to maximize pt's level of independence in the home and community environment.     Pt's spiritual, cultural and educational needs considered and pt agreeable to plan of care and goals.    Anticipated barriers to physical therapy: none    Goals:   Short Term Goals: 4 weeks (Progressing, not met)  - Pt will increase ROM to 35 R hip IR and ER  - Pt will increase strength to 4/5 B hip abd and ext  - Decrease Pain to 4/10 as worst with all PT interventions  - Pt to self correct posture and gait with minimal cues  - Pt independent with HEP with progressions.      Long Term Goals (8 Weeks): (Progressing, not met)  - Pt will tolerate sitting for 20 min painfree  - Pt will increase strength to 5/5 B hip abd and ext  - Decrease Pain to 3/10 as worst with all functional activities  - Pt to return to 70% PLOF    Plan     Continue POC per pt tolerance progressing hip and core strengthening.     Lennie Eckert, PT     "

## 2023-05-11 ENCOUNTER — CLINICAL SUPPORT (OUTPATIENT)
Dept: REHABILITATION | Facility: HOSPITAL | Age: 23
End: 2023-05-11
Payer: OTHER GOVERNMENT

## 2023-05-11 DIAGNOSIS — M25.551 RIGHT HIP PAIN: Primary | ICD-10-CM

## 2023-05-11 PROCEDURE — 97530 THERAPEUTIC ACTIVITIES: CPT | Mod: CQ

## 2023-05-11 PROCEDURE — 97140 MANUAL THERAPY 1/> REGIONS: CPT | Mod: CQ

## 2023-05-11 PROCEDURE — 97110 THERAPEUTIC EXERCISES: CPT | Mod: CQ

## 2023-05-11 PROCEDURE — 97112 NEUROMUSCULAR REEDUCATION: CPT | Mod: CQ

## 2023-05-11 NOTE — PROGRESS NOTES
"                          Physical Therapy Daily Treatment Note     Name: Ewa Welch  Clinic Number: [id number]    Therapy Diagnosis:   Encounter Diagnosis   Name Primary?    Right hip pain Yes     Physician: Leonard Becker MD    Visit Date: 5/11/2023    Physician Orders: PT Eval and Treat   Medical Diagnosis from Referral:   S73.191A (ICD-10-CM) - Tear of right acetabular labrum, initial encounter   M76.31 (ICD-10-CM) - Iliotibial band syndrome of right side      Evaluation Date: 5/5/2023  Authorization Period Expiration: 12/31/23  Plan of Care Expiration: 6/30/23  Visit # / Visits authorized: 2/ 20     Time In: 800 am  Time Out: 0900 am  Total Billable Time: 60 minutes     Precautions: Standard      Subjective      Pt reports:Some of the HEP increase hip pain. Woke up with 6/10 pain today which is improvement    she was compliant with home exercise program given last session.   Response to previous treatment:NA  Functional change: NA    Pain: 6/10  Location: right hip      Objective     Ewa received therapeutic exercises to develop strength, endurance, ROM, flexibility, and core stabilization for 27 minutes including:  QPED hand heel rocks with grey TB lateral/inferior mob 20x ea  Clamshells in neutral gtb x15 B with 5 sec hold-np  Crossed leg bridge 6x5 5" hold B  Reformer hip add/HS 2x 20 sec hold B  Reformer hip flexor, heather's lunge, 2x 20 sec B  HSS on plinth 2x 1 min B  Pt education and HEP review       Ewa participated in neuromuscular re-education activities to improve: Balance, Coordination, Sense, and Proprioception for 15 minutes. The following activities were included:  Hip isometrics: 5 x with 20 sec hold  - hip abd in hooklying-NP  - SL clamshells   - SL hip IR    Ewa participated in dynamic functional therapeutic activities to improve functional performance for 8  minutes, including:  Bike x10 min R6    Manual therapy: 10 min  Lateral/inferior joint mobs with belt  Infer    Home Exercises " "Provided and Patient Education Provided     Education provided:   - HEP to Go    Written Home Exercises Provided: Patient instructed to cont prior HEP.  Exercises were reviewed and Ewa was able to demonstrate them prior to the end of the session.  Ewa demonstrated good  understanding of the education provided.     See EMR under Patient Instructions for exercises provided {Belkis single:57027::"5/11/2023","prior visit"    Assessment     Hip mobs helped this session. Pt given band for self mobs at home. Anterior hip pain went from 6 to 5/10 by end of session. All exercises performed symptomatic free. Glute fatigue noted at end of session. Clams performed on wall without band 2* pt stating she feels anterior hip pain with the resistance. Pt noted difference in being able to isolate glute muscles with being against the wall.  Ewa Is progressing well towards her goals.   Pt prognosis is Good.     Pt will continue to benefit from skilled outpatient physical therapy to address the deficits listed in the problem list box on initial evaluation, provide pt/family education and to maximize pt's level of independence in the home and community environment.     Pt's spiritual, cultural and educational needs considered and pt agreeable to plan of care and goals.    Anticipated barriers to physical therapy: none    Goals:   Short Term Goals: 4 weeks (Progressing, not met)  - Pt will increase ROM to 35 R hip IR and ER  - Pt will increase strength to 4/5 B hip abd and ext  - Decrease Pain to 4/10 as worst with all PT interventions  - Pt to self correct posture and gait with minimal cues  - Pt independent with HEP with progressions.      Long Term Goals (8 Weeks): (Progressing, not met)  - Pt will tolerate sitting for 20 min painfree  - Pt will increase strength to 5/5 B hip abd and ext  - Decrease Pain to 3/10 as worst with all functional activities  - Pt to return to 70% PLOF    Plan     Continue POC per pt tolerance " progressing hip and core strengthening.     Juanita Rodríguez, PTA

## 2023-05-16 ENCOUNTER — PATIENT MESSAGE (OUTPATIENT)
Dept: REHABILITATION | Facility: HOSPITAL | Age: 23
End: 2023-05-16
Payer: OTHER GOVERNMENT

## 2023-05-18 ENCOUNTER — CLINICAL SUPPORT (OUTPATIENT)
Dept: REHABILITATION | Facility: HOSPITAL | Age: 23
End: 2023-05-18
Payer: OTHER GOVERNMENT

## 2023-05-18 DIAGNOSIS — M25.551 RIGHT HIP PAIN: Primary | ICD-10-CM

## 2023-05-18 PROCEDURE — 97530 THERAPEUTIC ACTIVITIES: CPT

## 2023-05-18 PROCEDURE — 97110 THERAPEUTIC EXERCISES: CPT

## 2023-05-18 NOTE — PROGRESS NOTES
"                          Physical Therapy Daily Treatment Note     Name: Ewa Welch  Clinic Number: [id number]    Therapy Diagnosis:   Encounter Diagnosis   Name Primary?    Right hip pain Yes     Physician: Leonard Becker MD    Visit Date: 5/18/2023    Physician Orders: PT Eval and Treat   Medical Diagnosis from Referral:   S73.191A (ICD-10-CM) - Tear of right acetabular labrum, initial encounter   M76.31 (ICD-10-CM) - Iliotibial band syndrome of right side      Evaluation Date: 5/5/2023  Authorization Period Expiration: 12/31/23  Plan of Care Expiration: 6/30/23  Visit # / Visits authorized: 3/ 20     Time In: 800 am  Time Out: 0855 am   Total Billable Time: 55 minutes     Precautions: Standard      Subjective      Pt reports:Some of the HEP increase hip pain. Woke up with 6/10 pain today which is improvement    she was compliant with home exercise program given last session.   Response to previous treatment:NA  Functional change: NA    Pain: 6/10  Location: right hip      Objective     Ewa received therapeutic exercises to develop strength, endurance, ROM, flexibility, and core stabilization for 35 minutes including:  QPED hand heel rocks with grey TB lateral/inferior mob 20x ea  Clamshells in neutral gtb x15 B with 5 sec hold-np  Crossed leg bridge 6x5 5" hold B  Reformer hip add/HS 2x 30 sec hold B  Reformer hip flexor, heather's lunge, 2x 30 sec B  HSS on plinth 2x 1 min B-np  Attempted hip flexor stretch by PT (stopped secondary neural symptoms)  Pt education and HEP review       Ewa participated in neuromuscular re-education activities to improve: Balance, Coordination, Sense, and Proprioception for 00 minutes. The following activities were included:  Hip isometrics: 5 x with 20 sec hold  - hip abd in hooklying-NP  - SL clamshells   - SL hip IR    Ewa participated in dynamic functional therapeutic activities to improve functional performance for 20 minutes, including:  Bike x10 min R6  Reformer hip " "abd/add 2 springs 2x15 B    Manual therapy: 00 min  Lateral/inferior joint mobs with belt  Infer    Home Exercises Provided and Patient Education Provided     Education provided:   - HEP to Go    Written Home Exercises Provided: Patient instructed to cont prior HEP.  Exercises were reviewed and Ewa was able to demonstrate them prior to the end of the session.  Ewa demonstrated good  understanding of the education provided.     See EMR under Patient Instructions for exercises provided {Blank single:93917::"5/18/2023","prior visit"    Assessment     Pt responded well to reformer interventions for ROM, stability, and strength for functional return. She demonstrated good understanding of self hip mobs to decrease R hip pain. Plan to progress as tolerated.   Ewa Is progressing well towards her goals.   Pt prognosis is Good.     Pt will continue to benefit from skilled outpatient physical therapy to address the deficits listed in the problem list box on initial evaluation, provide pt/family education and to maximize pt's level of independence in the home and community environment.     Pt's spiritual, cultural and educational needs considered and pt agreeable to plan of care and goals.    Anticipated barriers to physical therapy: none    Goals:   Short Term Goals: 4 weeks (Progressing, not met)  - Pt will increase ROM to 35 R hip IR and ER  - Pt will increase strength to 4/5 B hip abd and ext  - Decrease Pain to 4/10 as worst with all PT interventions  - Pt to self correct posture and gait with minimal cues  - Pt independent with HEP with progressions.      Long Term Goals (8 Weeks): (Progressing, not met)  - Pt will tolerate sitting for 20 min painfree  - Pt will increase strength to 5/5 B hip abd and ext  - Decrease Pain to 3/10 as worst with all functional activities  - Pt to return to 70% PLOF    Plan     Continue POC per pt tolerance progressing hip and core strengthening.     Lennie Eckert, PT         "

## 2023-05-22 ENCOUNTER — CLINICAL SUPPORT (OUTPATIENT)
Dept: REHABILITATION | Facility: HOSPITAL | Age: 23
End: 2023-05-22
Payer: OTHER GOVERNMENT

## 2023-05-22 DIAGNOSIS — M25.551 RIGHT HIP PAIN: Primary | ICD-10-CM

## 2023-05-22 PROCEDURE — 97530 THERAPEUTIC ACTIVITIES: CPT | Mod: CQ

## 2023-05-22 PROCEDURE — 97112 NEUROMUSCULAR REEDUCATION: CPT | Mod: CQ

## 2023-05-22 PROCEDURE — 97110 THERAPEUTIC EXERCISES: CPT | Mod: CQ

## 2023-05-22 NOTE — PROGRESS NOTES
"                          Physical Therapy Daily Treatment Note     Name: Ewa Welch  Clinic Number: [id number]    Therapy Diagnosis:   No diagnosis found.    Physician: Leonard Becker MD    Visit Date: 5/22/2023    Physician Orders: PT Eval and Treat   Medical Diagnosis from Referral:   S73.191A (ICD-10-CM) - Tear of right acetabular labrum, initial encounter   M76.31 (ICD-10-CM) - Iliotibial band syndrome of right side      Evaluation Date: 5/5/2023  Authorization Period Expiration: 12/31/23  Plan of Care Expiration: 6/30/23  Visit # / Visits authorized: 4/ 20     Time In: 0800 am  Time Out: 0900 am   Total Billable Time: 60 minutes     Precautions: Standard      Subjective      Pt reports:hip has been feeling better since injection   she was compliant with home exercise program given last session.   Response to previous treatment:NA  Functional change: NA    Pain: 6/10  Location: right hip      Objective     Ewa received therapeutic exercises to develop strength, endurance, ROM, flexibility, and core stabilization for 08 minutes including:    Reformer hip add/HS 2x 30 sec hold B  Reformer hip flexor, heather's lunge, 2x 30 sec B       Ewa participated in neuromuscular re-education activities to improve: Balance, Coordination, Sense, and Proprioception for 23 minutes. The following activities were included:  Side plank Clamshells YTB 30" hold x 3 min B  Crossed leg bridge 6x5 5" hold B  Lateral walks GTB around knees      Ewa participated in dynamic functional therapeutic activities to improve functional performance for 29 minutes, including:  Bike x10 min R6-NP  Elliptical x 10 min lvl 4  QPED hand heel rocks with grey TB lateral/inferior mob 20x earmer hip Reformer abd/add 2 springs 2x15 B  Reformer hip circles CW/CCW    Manual therapy: 00 min  Lateral/inferior joint mobs with belt  Infer    Home Exercises Provided and Patient Education Provided     Education provided:   - HEP to Go    Written Home " "Exercises Provided: Patient instructed to cont prior HEP.  Exercises were reviewed and Ewa was able to demonstrate them prior to the end of the session.  Ewa demonstrated good  understanding of the education provided.     See EMR under Patient Instructions for exercises provided {Belkis single:43872::"5/22/2023","prior visit"    Assessment     Progress hip and glute strengthening to day with good tolerance. LB strain noted after bridges. Will reassess technique next session to prevent lumber hyperext. Ellipical performed today 2* bike not being available.   Ewa Is progressing well towards her goals.   Pt prognosis is Good.     Pt will continue to benefit from skilled outpatient physical therapy to address the deficits listed in the problem list box on initial evaluation, provide pt/family education and to maximize pt's level of independence in the home and community environment.     Pt's spiritual, cultural and educational needs considered and pt agreeable to plan of care and goals.    Anticipated barriers to physical therapy: none    Goals:   Short Term Goals: 4 weeks (Progressing, not met)  - Pt will increase ROM to 35 R hip IR and ER  - Pt will increase strength to 4/5 B hip abd and ext  - Decrease Pain to 4/10 as worst with all PT interventions  - Pt to self correct posture and gait with minimal cues  - Pt independent with HEP with progressions.      Long Term Goals (8 Weeks): (Progressing, not met)  - Pt will tolerate sitting for 20 min painfree  - Pt will increase strength to 5/5 B hip abd and ext  - Decrease Pain to 3/10 as worst with all functional activities  - Pt to return to 70% PLOF    Plan     Continue POC per pt tolerance progressing hip and core strengthening.     Juanita Rodríguez, PTA           "

## 2023-05-24 ENCOUNTER — CLINICAL SUPPORT (OUTPATIENT)
Dept: REHABILITATION | Facility: HOSPITAL | Age: 23
End: 2023-05-24
Payer: OTHER GOVERNMENT

## 2023-05-24 DIAGNOSIS — M25.551 RIGHT HIP PAIN: Primary | ICD-10-CM

## 2023-05-24 PROCEDURE — 97110 THERAPEUTIC EXERCISES: CPT

## 2023-05-24 PROCEDURE — 97112 NEUROMUSCULAR REEDUCATION: CPT

## 2023-05-24 PROCEDURE — 97530 THERAPEUTIC ACTIVITIES: CPT

## 2023-05-24 NOTE — PROGRESS NOTES
"                          Physical Therapy Daily Treatment Note     Name: Ewa Welch  Clinic Number: [id number]    Therapy Diagnosis:   Encounter Diagnosis   Name Primary?    Right hip pain Yes       Physician: Leonard Becker MD    Visit Date: 5/24/2023    Physician Orders: PT Eval and Treat   Medical Diagnosis from Referral:   S73.191A (ICD-10-CM) - Tear of right acetabular labrum, initial encounter   M76.31 (ICD-10-CM) - Iliotibial band syndrome of right side      Evaluation Date: 5/5/2023  Authorization Period Expiration: 12/31/23  Plan of Care Expiration: 6/30/23  Visit # / Visits authorized: 5/ 20     Time In: 400 pm  Time Out: 450 pm   Total Billable Time: 50 minutes     Precautions: Standard      Subjective      Pt reports:hip is hurting going up stairs.    she was compliant with home exercise program given last session.   Response to previous treatment:NA  Functional change: NA    Pain: 6/10  Location: right hip      Objective     Ewa received therapeutic exercises to develop strength, endurance, ROM, flexibility, and core stabilization for 10 minutes including:  Supine hip flexor stretch with strap 2x1 min  Stair assessment and education  Reformer hip add/HS 2x 30 sec hold B-np  Reformer hip flexor, heather's lunge, 2x 30 sec B-np       Ewa participated in neuromuscular re-education activities to improve: Balance, Coordination, Sense, and Proprioception for 15 minutes. The following activities were included:  Side plank Clamshells YTB 30" hold x 3 min B-np  Crossed leg bridge 6x5 5" hold B-np  Lateral walks GTB around knees-np  Quadruped 4 way ball on wall R 3x30 each way      Ewa participated in dynamic functional therapeutic activities to improve functional performance for 25 minutes, including:  Bike x10 min R5  Elliptical x 10 min lvl 4-np  QPED hand heel rocks with grey TB lateral/inferior mob 20x earmer hip Reformer abd/add 2 springs 2x15 B  Reformer hip circles CW/CCW-np    Manual therapy: 00 " "min  Lateral/inferior joint mobs with belt  Infer    Home Exercises Provided and Patient Education Provided     Education provided:   - HEP to Go    Written Home Exercises Provided: Patient instructed to cont prior HEP.  Exercises were reviewed and Ewa was able to demonstrate them prior to the end of the session.  Ewa demonstrated good  understanding of the education provided.     See EMR under Patient Instructions for exercises provided {Blank single:16922::"5/24/2023","prior visit"    Assessment     Pt showed increased fatigue this session performing hip strengthening and endurance interventions. Plan to return to reform next session when hip is feeling better. Pt educated to walk up stairs backwards secondary to no elevator at work.   Ewa Is progressing well towards her goals.   Pt prognosis is Good.     Pt will continue to benefit from skilled outpatient physical therapy to address the deficits listed in the problem list box on initial evaluation, provide pt/family education and to maximize pt's level of independence in the home and community environment.     Pt's spiritual, cultural and educational needs considered and pt agreeable to plan of care and goals.    Anticipated barriers to physical therapy: none    Goals:   Short Term Goals: 4 weeks (Progressing, not met)  - Pt will increase ROM to 35 R hip IR and ER  - Pt will increase strength to 4/5 B hip abd and ext  - Decrease Pain to 4/10 as worst with all PT interventions  - Pt to self correct posture and gait with minimal cues  - Pt independent with HEP with progressions.      Long Term Goals (8 Weeks): (Progressing, not met)  - Pt will tolerate sitting for 20 min painfree  - Pt will increase strength to 5/5 B hip abd and ext  - Decrease Pain to 3/10 as worst with all functional activities  - Pt to return to 70% PLOF    Plan     Continue POC per pt tolerance progressing hip and core strengthening.     Lennie Eckert, PT             "

## 2023-05-30 ENCOUNTER — CLINICAL SUPPORT (OUTPATIENT)
Dept: REHABILITATION | Facility: HOSPITAL | Age: 23
End: 2023-05-30
Payer: OTHER GOVERNMENT

## 2023-05-30 DIAGNOSIS — M25.551 RIGHT HIP PAIN: Primary | ICD-10-CM

## 2023-05-30 PROCEDURE — 97530 THERAPEUTIC ACTIVITIES: CPT

## 2023-05-30 PROCEDURE — 97110 THERAPEUTIC EXERCISES: CPT

## 2023-05-30 PROCEDURE — 97112 NEUROMUSCULAR REEDUCATION: CPT

## 2023-05-30 NOTE — PROGRESS NOTES
"                          Physical Therapy Daily Treatment Note     Name: Ewa Welch  Clinic Number: [id number]    Therapy Diagnosis:   Encounter Diagnosis   Name Primary?    Right hip pain Yes       Physician: Leonard Becker MD    Visit Date: 5/30/2023    Physician Orders: PT Eval and Treat   Medical Diagnosis from Referral:   S73.191A (ICD-10-CM) - Tear of right acetabular labrum, initial encounter   M76.31 (ICD-10-CM) - Iliotibial band syndrome of right side      Evaluation Date: 5/5/2023  Authorization Period Expiration: 12/31/23  Plan of Care Expiration: 6/30/23  Visit # / Visits authorized: 6/ 20     Time In: 800 am  Time Out: 900 am   Total Billable Time: 60 minutes     Precautions: Standard      Subjective      Pt reports:having some hip flexor pain while driving.    she was compliant with home exercise program given last session.   Response to previous treatment:NA  Functional change: NA    Pain: not quantified this visit/ 10  Location: right hip      Objective     Ewa received therapeutic exercises to develop strength, endurance, ROM, flexibility, and core stabilization for 25 minutes including:  Foam roller IT and hip flexor  "Z" sit x10 B  Dynamic warm up (scoops and quad pulls) x1 lap ea  Stick rolling IT band  Supine hip flexor stretch with strap 2x1 min-np  Reformer hip add/HS 2x 30 sec hold B  Reformer hip flexor 2x 30 sec B-np  Pt education and HEP review    Ewa participated in neuromuscular re-education activities to improve: Balance, Coordination, Sense, and Proprioception for 10 minutes. The following activities were included:  Side plank Clamshells YTB 30" hold x 3 min B-np  Crossed leg bridge 6x5 5" hold B-np  Lateral walks GTB around knees-np  3D HS on SB 2x10   Quadruped 4 way ball on wall R 3x30 each way-np      Ewa participated in dynamic functional therapeutic activities to improve functional performance for 25 minutes, including:  Bike x10 min R5  Elliptical x 10 min lvl " "4-np  SL STS on R 20" box 2x10  QPED hand heel rocks with grey TB lateral/inferior mob 20x earmer hip Reformer abd/add 2 springs 2x15 B-np  Reformer hip circles CW/CCW-np    Manual therapy: 00 min  Lateral/inferior joint mobs with belt  Infer    Home Exercises Provided and Patient Education Provided     Education provided:   - HEP to Go    Written Home Exercises Provided: Patient instructed to cont prior HEP.  Exercises were reviewed and Ewa was able to demonstrate them prior to the end of the session.  Ewa demonstrated good  understanding of the education provided.     See EMR under Patient Instructions for exercises provided {Blank single:62760::"5/30/2023","prior visit"    Assessment     Pt treatment today mainly focused on pain management the first part. She responded fairly well to dynamic movements, but had more pain with foam rolling. Plan to progress as tolerated for posterior strengthening.   Ewa Is progressing well towards her goals.   Pt prognosis is Good.     Pt will continue to benefit from skilled outpatient physical therapy to address the deficits listed in the problem list box on initial evaluation, provide pt/family education and to maximize pt's level of independence in the home and community environment.     Pt's spiritual, cultural and educational needs considered and pt agreeable to plan of care and goals.    Anticipated barriers to physical therapy: none    Goals:   Short Term Goals: 4 weeks (Progressing, not met)  - Pt will increase ROM to 35 R hip IR and ER  - Pt will increase strength to 4/5 B hip abd and ext  - Decrease Pain to 4/10 as worst with all PT interventions  - Pt to self correct posture and gait with minimal cues  - Pt independent with HEP with progressions.      Long Term Goals (8 Weeks): (Progressing, not met)  - Pt will tolerate sitting for 20 min painfree  - Pt will increase strength to 5/5 B hip abd and ext  - Decrease Pain to 3/10 as worst with all functional " activities  - Pt to return to 70% PLOF    Plan     Continue POC per pt tolerance progressing hip and core strengthening.     Lennie Eckert, PT

## 2023-06-01 ENCOUNTER — CLINICAL SUPPORT (OUTPATIENT)
Dept: REHABILITATION | Facility: HOSPITAL | Age: 23
End: 2023-06-01
Payer: OTHER GOVERNMENT

## 2023-06-01 DIAGNOSIS — M25.551 RIGHT HIP PAIN: Primary | ICD-10-CM

## 2023-06-01 PROCEDURE — 97530 THERAPEUTIC ACTIVITIES: CPT | Mod: CQ

## 2023-06-01 PROCEDURE — 97112 NEUROMUSCULAR REEDUCATION: CPT | Mod: CQ

## 2023-06-01 NOTE — PROGRESS NOTES
"                          Physical Therapy Daily Treatment Note     Name: Ewa Welch  Clinic Number: [id number]    Therapy Diagnosis:   Encounter Diagnosis   Name Primary?    Right hip pain Yes         Physician: Leonard Becker MD    Visit Date: 6/1/2023    Physician Orders: PT Eval and Treat   Medical Diagnosis from Referral:   S73.191A (ICD-10-CM) - Tear of right acetabular labrum, initial encounter   M76.31 (ICD-10-CM) - Iliotibial band syndrome of right side      Evaluation Date: 5/5/2023  Authorization Period Expiration: 12/31/23  Plan of Care Expiration: 6/30/23  Visit # / Visits authorized: 7/ 20     Time In: 0800 am  Time Out: 0900 am   Total Billable Time: 23 minutes     Precautions: Standard      Subjective      Pt reports: Hip is feeling much better from previous session.    she was compliant with home exercise program given last session.   Response to previous treatment:NA  Functional change: NA    Pain: not quantified this visit/ 10  Location: right hip      Objective     Ewa received therapeutic exercises to develop strength, endurance, ROM, flexibility, and core stabilization for 15 minutes including:  "Z" sit x10 B-np  Dynamic warm up (scoops and quad pulls) x1 lap ea  Reformer hip add/HS 2x 30 sec hold B    Ewa participated in neuromuscular re-education activities to improve: Balance, Coordination, Sense, and Proprioception for 23 minutes. The following activities were included:  Side plank Clamshells GTB 20" hold x 3 min B  Lateral walks GTB around knees-np  3D HS on SB 3x12   Quadruped 4 way ball on wall R 3x30 each way-np  SL RDL 3x10      Ewa participated in dynamic functional therapeutic activities to improve functional performance for 22 minutes, including:  Bike x10 min R5  SL STS on R 20" box 2x10  QPED hand heel rocks with grey TB lateral/inferior mob 20x earmer hip Reformer abd/add 2 springs 1x15 B    Manual therapy: 00 min  Lateral/inferior joint mobs with belt  Infer    Home " "Exercises Provided and Patient Education Provided     Education provided:   - HEP to Go    Written Home Exercises Provided: Patient instructed to cont prior HEP.  Exercises were reviewed and Ewa was able to demonstrate them prior to the end of the session.  Ewa demonstrated good  understanding of the education provided.     See EMR under Patient Instructions for exercises provided {Belkis single:77281::"6/1/2023","prior visit"    Assessment     Was able to reintroduced hip/glute strengthening interventions today. Pt stated only being able to perform 1 set of hip add/abd on reforming 2* adductor and glute med fatigue which she described at first as pain. Challenged with balance on RDLs. WIll cont to work on posterior chain and ER strength to decrease anterior hip stress/impingement.   Ewa Is progressing well towards her goals.   Pt prognosis is Good.     Pt will continue to benefit from skilled outpatient physical therapy to address the deficits listed in the problem list box on initial evaluation, provide pt/family education and to maximize pt's level of independence in the home and community environment.     Pt's spiritual, cultural and educational needs considered and pt agreeable to plan of care and goals.    Anticipated barriers to physical therapy: none    Goals:   Short Term Goals: 4 weeks (Progressing, not met)  - Pt will increase ROM to 35 R hip IR and ER  - Pt will increase strength to 4/5 B hip abd and ext  - Decrease Pain to 4/10 as worst with all PT interventions  - Pt to self correct posture and gait with minimal cues  - Pt independent with HEP with progressions.      Long Term Goals (8 Weeks): (Progressing, not met)  - Pt will tolerate sitting for 20 min painfree  - Pt will increase strength to 5/5 B hip abd and ext  - Decrease Pain to 3/10 as worst with all functional activities  - Pt to return to 70% PLOF    Plan     Continue POC per pt tolerance progressing hip and core strengthening. "     Juanita Rodríguez, PTA

## 2023-06-06 ENCOUNTER — CLINICAL SUPPORT (OUTPATIENT)
Dept: REHABILITATION | Facility: HOSPITAL | Age: 23
End: 2023-06-06
Payer: OTHER GOVERNMENT

## 2023-06-06 DIAGNOSIS — M25.551 RIGHT HIP PAIN: Primary | ICD-10-CM

## 2023-06-06 PROCEDURE — 97112 NEUROMUSCULAR REEDUCATION: CPT

## 2023-06-06 PROCEDURE — 97530 THERAPEUTIC ACTIVITIES: CPT

## 2023-06-06 PROCEDURE — 97110 THERAPEUTIC EXERCISES: CPT

## 2023-06-06 NOTE — PROGRESS NOTES
"                          Physical Therapy Daily Treatment Note     Name: Ewa Welch  Clinic Number: [id number]    Therapy Diagnosis:   Encounter Diagnosis   Name Primary?    Right hip pain Yes         Physician: Leonard Becker MD    Visit Date: 6/6/2023    Physician Orders: PT Eval and Treat   Medical Diagnosis from Referral:   S73.191A (ICD-10-CM) - Tear of right acetabular labrum, initial encounter   M76.31 (ICD-10-CM) - Iliotibial band syndrome of right side      Evaluation Date: 5/5/2023  Authorization Period Expiration: 12/31/23  Plan of Care Expiration: 6/30/23  Visit # / Visits authorized: 8/ 20     Time In: 0800 am  Time Out: 0900 am   Total Billable Time: 60 minutes     Precautions: Standard      Subjective      Pt reports: still cannot stay in one position for too long without getting pain.    she was compliant with home exercise program given last session.   Response to previous treatment:NA  Functional change: NA    Pain: not quantified this visit/ 10  Location: right hip      Objective   Strength: 6/6/23  Hip Left Right   Flexion 5/5 5/5   Abduction 5/5 4+/5   Extension 3/5 3/5   External Rot 5/5 5/5      Knee Left Right   Extension 5/5 5/5   Flexion 4+/5 4+/5     Ewa received therapeutic exercises to develop strength, endurance, ROM, flexibility, and core stabilization for 20 minutes including:  Hip matrix x4 ways 2x10 ea  "Z" sit x10 B-np  Dynamic warm up (scoops and quad pulls) x1 lap ea-np  Reformer hip add/HS 2x 30 sec hold B  Pt education, PT reassessment x10 min    Ewa participated in neuromuscular re-education activities to improve: Balance, Coordination, Sense, and Proprioception for 15 minutes. The following activities were included:  Side plank Clamshells GTB 20" hold x 3 min B-np  Lateral walks GTB around knees-np  3D HS on SB 3x12 -np  SLS on bosu 2x 20 B rebounder yellow med ball  Quadruped 4 way ball on wall R 3x30 each way-np  SL RDL 3x10-np  Modified side plank hip dips 4x 5 " "reps R      Ewa participated in dynamic functional therapeutic activities to improve functional performance for 25 minutes, including:  Bike x10 min R5  Shuttle SL hip ext 25# 2x fatigue  Crossed leg bridge 3x fatigue  SL STS on R 20" box 2x10-np  QPED hand heel rocks with grey TB lateral/inferior mob 20x earmer hip Reformer abd/add 2 springs 1x15 B-np    Manual therapy: 00 min  Lateral/inferior joint mobs with belt  Infer    Home Exercises Provided and Patient Education Provided     Education provided:   - HEP to Go    Written Home Exercises Provided: Patient instructed to cont prior HEP.  Exercises were reviewed and Ewa was able to demonstrate them prior to the end of the session.  Ewa demonstrated good  understanding of the education provided.     See EMR under Patient Instructions for exercises provided {Blank single:04685::"6/6/2023","prior visit"    Assessment     Pt reps and sets focused more on tolerance and symptoms today to prevent flare up in R hip. Upon reassessment, she is improving B hip strength from initial evaluation. Will cont to work on posterior chain and ER strength to decrease anterior hip stress/impingement.   Ewa Is progressing well towards her goals.   Pt prognosis is Good.     Pt will continue to benefit from skilled outpatient physical therapy to address the deficits listed in the problem list box on initial evaluation, provide pt/family education and to maximize pt's level of independence in the home and community environment.     Pt's spiritual, cultural and educational needs considered and pt agreeable to plan of care and goals.    Anticipated barriers to physical therapy: none    Goals:   Short Term Goals: 4 weeks (Progressing, not met)  - Pt will increase ROM to 35 R hip IR and ER  - Pt will increase strength to 4/5 B hip abd and ext  - Decrease Pain to 4/10 as worst with all PT interventions  - Pt to self correct posture and gait with minimal cues  - Pt independent with " HEP with progressions.      Long Term Goals (8 Weeks): (Progressing, not met)  - Pt will tolerate sitting for 20 min painfree  - Pt will increase strength to 5/5 B hip abd and ext  - Decrease Pain to 3/10 as worst with all functional activities  - Pt to return to 70% PLOF    Plan     Continue POC per pt tolerance progressing hip and core strengthening.     Lennie Eckert, PT

## 2023-06-08 ENCOUNTER — OFFICE VISIT (OUTPATIENT)
Dept: SPORTS MEDICINE | Facility: CLINIC | Age: 23
End: 2023-06-08
Payer: OTHER GOVERNMENT

## 2023-06-08 VITALS
DIASTOLIC BLOOD PRESSURE: 58 MMHG | HEART RATE: 55 BPM | BODY MASS INDEX: 21.86 KG/M2 | WEIGHT: 136 LBS | HEIGHT: 66 IN | SYSTOLIC BLOOD PRESSURE: 94 MMHG

## 2023-06-08 DIAGNOSIS — S73.191A TEAR OF RIGHT ACETABULAR LABRUM, INITIAL ENCOUNTER: Primary | ICD-10-CM

## 2023-06-08 DIAGNOSIS — M25.851 FEMOROACETABULAR IMPINGEMENT OF RIGHT HIP: ICD-10-CM

## 2023-06-08 PROCEDURE — 99213 OFFICE O/P EST LOW 20 MIN: CPT | Mod: S$PBB,,, | Performed by: STUDENT IN AN ORGANIZED HEALTH CARE EDUCATION/TRAINING PROGRAM

## 2023-06-08 PROCEDURE — 99999 PR PBB SHADOW E&M-EST. PATIENT-LVL III: CPT | Mod: PBBFAC,,, | Performed by: STUDENT IN AN ORGANIZED HEALTH CARE EDUCATION/TRAINING PROGRAM

## 2023-06-08 PROCEDURE — 99999 PR PBB SHADOW E&M-EST. PATIENT-LVL III: ICD-10-PCS | Mod: PBBFAC,,, | Performed by: STUDENT IN AN ORGANIZED HEALTH CARE EDUCATION/TRAINING PROGRAM

## 2023-06-08 PROCEDURE — 99213 OFFICE O/P EST LOW 20 MIN: CPT | Mod: PBBFAC | Performed by: STUDENT IN AN ORGANIZED HEALTH CARE EDUCATION/TRAINING PROGRAM

## 2023-06-08 PROCEDURE — 99213 PR OFFICE/OUTPT VISIT, EST, LEVL III, 20-29 MIN: ICD-10-PCS | Mod: S$PBB,,, | Performed by: STUDENT IN AN ORGANIZED HEALTH CARE EDUCATION/TRAINING PROGRAM

## 2023-06-08 NOTE — PROGRESS NOTES
Subjective:          Chief Complaint: Ewa Welch is a 23 y.o. female who had concerns including Follow-up of the Right Hip.    HPI 6/8/23:  Ewa Welch is a 23 y.o. female returns to clinic for follow up evaluation for her right hip pain. Since her last visit, she has received a corticosteroid injection with Dr. Rudd on 4/27/23 that she feels did give her some relief. She notes that her pain is still presents however it is decreased in intensity. She has been in formal physical therapy at the [facility] 2x week. She notes that she does not have any increased pain at night or difficulty sleeping.       HPI 4/27/23  Ewa Welch is a 22 y.o. female [employer] and former [employer] that presents for evaluation for her right hip pain. She is referred by Dr. Hubbard. She states that her pain started about 3-4 years ago while training in the .  There is no specific inciting injury, event, or trauma.  She rates her pain as a 9/10 at worst over the anterior groin as well as the lateral aspect of the right hip. She endorses a positive C-sign.  The pain increases with hip flexion.  She reports having catching and popping sensations in the hip after long periods of sitting. She has completed multiple courses of physical therapy while in the  with no relief. She also states that she has received an injection to the lateral aspect of the hip with some relief.       Past Medical History:   Diagnosis Date    Generalized anxiety disorder 03/29/2016    Infected pilonidal cyst 4/5/2017    Seasonal allergic rhinitis due to pollen 4/4/2013    Self-harming behavior     Tonsillar hypertrophy 2/12/2018       Current Outpatient Medications on File Prior to Visit   Medication Sig Dispense Refill    fluticasone (FLONASE) 50 mcg/actuation nasal spray 1 spray (50 mcg total) by Each Nare route once daily. (Patient not taking: Reported on 6/8/2023) 16 g 11    loratadine (CLARITIN) 10 mg tablet Take 1 tablet (10 mg total) by  mouth once daily. (Patient taking differently: Take 10 mg by mouth daily as needed. ) 30 tablet 3     No current facility-administered medications on file prior to visit.       Past Surgical History:   Procedure Laterality Date    ADENOIDECTOMY  02/12/2018    Dr. Bernard    OTHER SURGICAL HISTORY Left     toenail removal big toe    TONSILLECTOMY  02/12/2018    Dr. Bernard    WISDOM TOOTH EXTRACTION Bilateral        Family History   Problem Relation Age of Onset    Allergies Mother     Eczema Sister     Cancer Paternal Grandmother     Cirrhosis Paternal Grandmother     No Known Problems Father        Social History     Socioeconomic History    Marital status:    Tobacco Use    Smoking status: Never    Smokeless tobacco: Never   Substance and Sexual Activity    Alcohol use: Yes     Comment: Occasional    Drug use: No    Sexual activity: Yes     Partners: Female, Male   Social History Narrative    Lives with mom, dad and sister Erika.    She is planning to go to the [employer] next year.    Sexually relationship with female.              Review of Systems   Constitutional: Negative.   HENT: Negative.     Eyes: Negative.    Cardiovascular: Negative.    Respiratory: Negative.     Endocrine: Negative.    Hematologic/Lymphatic: Negative.    Skin: Negative.    Musculoskeletal:  Positive for joint pain (right hip) and muscle weakness. Negative for arthritis, falls, gout, joint swelling, muscle cramps, myalgias, neck pain and stiffness.   Neurological: Negative.    Psychiatric/Behavioral: Negative.     Allergic/Immunologic: Negative.        Pain Related Questions  Over the past 3 days, what was your average pain during activity? (I.e. running, jogging, walking, climbing stairs, getting dressed, ect.): 2  Over the past 3 days, what was your highest pain level?: 3  Over the past 3 days, what was your lowest pain level? : 1    Other  How many nights a week are you awakened by your affected body part?: 3  Was the patient's  HEIGHT measured or patient reported?: Patient Reported  Was the patient's WEIGHT measured or patient reported?: Measured      Objective:        General: Ewa is well-developed, well-nourished, appears stated age, in no acute distress, alert and oriented to time, place and person.     General    Nursing note and vitals reviewed.  Constitutional: She is oriented to person, place, and time. She appears well-developed and well-nourished. No distress.   HENT:   Head: Normocephalic and atraumatic.   Nose: Nose normal.   Eyes: EOM are normal.   Cardiovascular:  Intact distal pulses.            Pulmonary/Chest: Effort normal. No respiratory distress.   Neurological: She is alert and oriented to person, place, and time.   Psychiatric: She has a normal mood and affect. Her behavior is normal. Judgment and thought content normal.           Right Knee Exam     Inspection   Alignment:  normal  Effusion: absent    Left Knee Exam     Inspection   Alignment:  normal  Effusion: absent    Right Hip Exam     Inspection   Scars: absent  Swelling: absent  Bruising: absent  No deformity of hip.  Quadriceps Atrophy:  Negative  Erythema: absent    Tenderness   The patient tender to palpation of the trochanteric bursa and rectus insertion.    Range of Motion   Abduction:  45   Adduction:  30   Extension:  0   Flexion:  100   External rotation:  70   Internal rotation:  15     Tests   Pain w/ forced internal rotation (LIZETTE): absent  Pain w/ forced external rotation (FADIR): present  Cynthia: negative  Circumduction test: negative  Stinchfield test: positive  Log Roll: negative  Snapping Hip (internal): negative    Other   Sensation: normal  Left Hip Exam     Inspection   Scars: absent  Swelling: absent  No deformity of hip.  Quadriceps Atrophy:  negative  Erythema: absent  Bruising: absent    Range of Motion   Abduction:  45   Adduction:  30   Extension:  0   Flexion:  120   External rotation:  70   Internal rotation: 30     Tests   Pain w/  forced internal rotation (LIZETTE): absent  Pain w/ forced external rotation (FADIR): absent  Cynthia: negative  Trendelenburg Test: negative  Circumduction test: negative  Stinchfield test: negative  Log Roll: negative  Snapping Hip (internal): negative    Other   Sensation: normal          Muscle Strength   Right Lower Extremity   Hip Abduction: 4/5   Hip Adduction: 5/5   Hip Flexion: 4/5   Ankle Dorsiflexion:  5/5   Left Lower Extremity   Hip Abduction: 5/5   Hip Adduction: 5/5   Hip Flexion: 5/5   Ankle Dorsiflexion:  5/5     Vascular Exam     Right Pulses  Dorsalis Pedis:      2+  Posterior Tibial:      2+        Left Pulses  Dorsalis Pedis:      2+  Posterior Tibial:      2+        Capillary Refill  Left Hand: normal capillary refill        Edema  Right Upper Leg: absent  Left Upper Leg: absent        Imaging:  X-rays of the right hip from 04/27/2023 personally viewed by me on that day.  These include AP pelvis, AP right hip, and bilateral modified Grigsby.  Bilateral cam deformities.  There is no crossover sign, retroversion, or dysplasia.  No arthritic changes.      MRI of the right hip from 04/15/2023 personally viewed by me 04/27/2023.  This is a non arthrogram study.  There is an acetabular labral tear in the anterior and superior aspect of the hip.  There is a cam impingement.  There is no cartilage damage.      Assessment:     Ewa Welch is a 23 y.o. female with right hip labral tear, femoroacetabular impingement, as well as IT band syndrome/trochanteric bursitis  Encounter Diagnoses   Name Primary?    Tear of right acetabular labrum, initial encounter Yes    Femoroacetabular impingement of right hip             Plan:       We again discussed the diagnosis and treatment options.  She will consider her options of continuing physical therapy and rehabilitation versus surgical intervention.    All of their questions were answered.  They will call the clinic with any questions or concerns in the interim.    Should  the patient's symptoms worsen, persist, or fail to improve they should return for reevaluation and I would be happy to see them back anytime.        Leonard Becker M.D.    Please be aware that this note has been generated with the assistance of [employer] voice-to-text.  Please excuse any spelling or grammatical errors.    Thank you for choosing Dr. Leonard Becker for your sports medicine care. It is our goal to provide you with exceptional care that will help keep you healthy, active, and get you back in the game.     If you felt that you received exemplary care today, please consider leaving feedback for Dr. Becker on [employer] at [email or web address].    Please do not hesitate to reach out to us via email, phone, or MyChart with any questions, concerns, or feedback.

## 2023-06-09 ENCOUNTER — CLINICAL SUPPORT (OUTPATIENT)
Dept: REHABILITATION | Facility: HOSPITAL | Age: 23
End: 2023-06-09
Payer: OTHER GOVERNMENT

## 2023-06-09 DIAGNOSIS — M25.551 RIGHT HIP PAIN: Primary | ICD-10-CM

## 2023-06-09 PROCEDURE — 97530 THERAPEUTIC ACTIVITIES: CPT | Mod: CQ

## 2023-06-09 PROCEDURE — 97112 NEUROMUSCULAR REEDUCATION: CPT | Mod: CQ

## 2023-06-09 PROCEDURE — 97110 THERAPEUTIC EXERCISES: CPT | Mod: CQ

## 2023-06-09 NOTE — PROGRESS NOTES
"                          Physical Therapy Daily Treatment Note     Name: Ewa Welch  Clinic Number: [id number]    Therapy Diagnosis:   Encounter Diagnosis   Name Primary?    Right hip pain Yes         Physician: Leonard Becker MD    Visit Date: 6/9/2023    Physician Orders: PT Eval and Treat   Medical Diagnosis from Referral:   S73.191A (ICD-10-CM) - Tear of right acetabular labrum, initial encounter   M76.31 (ICD-10-CM) - Iliotibial band syndrome of right side      Evaluation Date: 5/5/2023  Authorization Period Expiration: 12/31/23  Plan of Care Expiration: 6/30/23  Visit # / Visits authorized: 9/ 20     Time In: 0800 am  Time Out: 0925 am   Total Billable Time: 85 minutes     Precautions: Standard      Subjective      Pt reports: Saw MD and he mentioned wanting to do surgery. Feeling 30-40% improved. Performing mostly stretches at home. Not as much strengthening.    she was compliant with home exercise program given last session.   Response to previous treatment:NA  Functional change: NA    Pain: not quantified this visit/ 10  Location: right hip      Objective   Strength: 6/6/23  Hip Left Right   Flexion 5/5 5/5   Abduction 5/5 4+/5   Extension 3/5 3/5   External Rot 5/5 5/5      Knee Left Right   Extension 5/5 5/5   Flexion 4+/5 4+/5     Ewa received therapeutic exercises to develop strength, endurance, ROM, flexibility, and core stabilization for 20 minutes including:    Dynamic warm up (scoops and quad pulls) x2 lap ea  Pt education,     Np:  Hip matrix x4 ways 2x10 ea  "Z" sit x10 B-np  Reformer hip add/HS 2x 30 sec hold B    Ewa participated in neuromuscular re-education activities to improve: Balance, Coordination, Sense, and Proprioception for 20 minutes. The following activities were included:  SLS on bosu 2x 20 B rebounder yellow med ball  Modified side plank hip dips 4x 5 reps R  HS bridges with 20" block 5" hold 4x8    Np:  Side plank Clamshells GTB 20" hold x 3 min B-np  Lateral walks GTB " "around knees-np  3D HS on SB 3x12 -np  SL RDL 3x10-np  Quadruped 4 way ball on wall R 3x30 each way    Ewa participated in dynamic functional therapeutic activities to improve functional performance for 45 minutes, including:  Bike x05 min R5  Shuttle SL hip ext 25# 2x fatigue  Hip thurster with YTB around knees 5" hold 3x10  QPED hand heel rocks with grey TB lateral/inferior mob 20x earmer hip   step up 6" 3x10  Reformer abd/add 2 springs 2x15 B    NP:    Crossed leg bridge 3x fatigue  SL STS on R 20" box 2x10-np    Manual therapy: 00 min  Lateral/inferior joint mobs with belt  Infer    Home Exercises Provided and Patient Education Provided     Education provided:   - HEP to Go    Written Home Exercises Provided: Patient instructed to cont prior HEP.  Exercises were reviewed and Ewa was able to demonstrate them prior to the end of the session.  Ewa demonstrated good  understanding of the education provided.     See EMR under Patient Instructions for exercises provided {Blank single:00043::"6/9/2023","prior visit"    Assessment     Discussed with pt today her options moving forward with therapy and possible surgery. Pt has seen improvements as subjectively stated. Pt understands about her cam lesion and how that causes impingement. Pt has not been compliant with strengthening exercises at home. Educated pt the importance of hip/glute strengthening to improve symptoms and to decrease anterior hip stress. Pt will cont therapy for another month and in the time frame will decide about surgery. Updated HEP so pt knows specific strengthening exercises to perform at home.   Ewa Is progressing well towards her goals.   Pt prognosis is Good.     Pt will continue to benefit from skilled outpatient physical therapy to address the deficits listed in the problem list box on initial evaluation, provide pt/family education and to maximize pt's level of independence in the home and community environment.     Pt's " spiritual, cultural and educational needs considered and pt agreeable to plan of care and goals.    Anticipated barriers to physical therapy: none    Goals:   Short Term Goals: 4 weeks (Progressing, not met)  - Pt will increase ROM to 35 R hip IR and ER  - Pt will increase strength to 4/5 B hip abd and ext  - Decrease Pain to 4/10 as worst with all PT interventions  - Pt to self correct posture and gait with minimal cues  - Pt independent with HEP with progressions.      Long Term Goals (8 Weeks): (Progressing, not met)  - Pt will tolerate sitting for 20 min painfree  - Pt will increase strength to 5/5 B hip abd and ext  - Decrease Pain to 3/10 as worst with all functional activities  - Pt to return to 70% PLOF    Plan     Continue POC per pt tolerance progressing hip and core strengthening.     Juanita Rodríguez, PTA

## 2023-06-14 ENCOUNTER — CLINICAL SUPPORT (OUTPATIENT)
Dept: REHABILITATION | Facility: HOSPITAL | Age: 23
End: 2023-06-14
Payer: OTHER GOVERNMENT

## 2023-06-14 DIAGNOSIS — M25.551 RIGHT HIP PAIN: Primary | ICD-10-CM

## 2023-06-14 PROCEDURE — 97530 THERAPEUTIC ACTIVITIES: CPT | Mod: CQ

## 2023-06-14 PROCEDURE — 97112 NEUROMUSCULAR REEDUCATION: CPT | Mod: CQ

## 2023-06-14 NOTE — PROGRESS NOTES
"                          Physical Therapy Daily Treatment Note     Name: Ewa Welch  Clinic Number: [id number]    Therapy Diagnosis:   Encounter Diagnosis   Name Primary?    Right hip pain Yes         Physician: Leonard Becker MD    Visit Date: 6/14/2023    Physician Orders: PT Eval and Treat   Medical Diagnosis from Referral:   S73.191A (ICD-10-CM) - Tear of right acetabular labrum, initial encounter   M76.31 (ICD-10-CM) - Iliotibial band syndrome of right side      Evaluation Date: 5/5/2023  Authorization Period Expiration: 12/31/23  Plan of Care Expiration: 6/30/23  Visit # / Visits authorized: 10/ 20     Time In: 0800 am  Time Out: 0900 am   Total Billable Time: 30 minutes     Precautions: Standard      Subjective      Pt reports: [place] with minimal discomfort when I got home. Performed HEP and gave some relief.   she was compliant with home exercise program given last session.   Response to previous treatment:NA  Functional change: NA    Pain: not quantified this visit/ 10  Location: right hip      Objective   Strength: 6/6/23  Hip Left Right   Flexion 5/5 5/5   Abduction 5/5 4+/5   Extension 3/5 3/5   External Rot 5/5 5/5      Knee Left Right   Extension 5/5 5/5   Flexion 4+/5 4+/5     Ewa received therapeutic exercises to develop strength, endurance, ROM, flexibility, and core stabilization for 08 minutes including:    Dynamic warm up (scoops and quad pulls) x2 lap ea  Pt education,     Np:  Hip matrix x4 ways 2x10 ea  "Z" sit x10 B-np  Reformer hip add/HS 2x 30 sec hold B    Ewa participated in neuromuscular re-education activities to improve: Balance, Coordination, Sense, and Proprioception for 42 minutes. The following activities were included:    BFR: 30/15/15/15 80% occlusion  Clamshells YTB   S/L bridge  SL hip ABD  SL shuttle ( 30/12 then stopped)        Np:  HS bridges with 20" block 5" hold 4x8  Modified side plank hip dips 4x 5 reps R  Lateral walks GTB " "around knees-np  3D HS on SB 3x12 -np  SL RDL 3x10-np  Quadruped 4 way ball on wall R 3x30 each way  SLS on bosu 2x 20 B rebounder yellow med ball  Ewa participated in dynamic functional therapeutic activities to improve functional performance for 10 minutes, including:  Bike x10 min R6    Np:  Shuttle SL hip ext 25# 2x fatigue  Hip thurster with YTB around knees 5" hold 3x10  QPED hand heel rocks with grey TB lateral/inferior mob 20x earmer hip   step up 6" 3x10  Reformer abd/add 2 springs 2x15 B        Manual therapy: 00 min  Lateral/inferior joint mobs with belt  Infer    Home Exercises Provided and Patient Education Provided     Education provided:   - HEP to Go    Written Home Exercises Provided: Patient instructed to cont prior HEP.  Exercises were reviewed and Ewa was able to demonstrate them prior to the end of the session.  Ewa demonstrated good  understanding of the education provided.     See EMR under Patient Instructions for exercises provided {Blank single:16159::"6/14/2023","prior visit"    Assessment     Ewa is performing more strengthening exercises at home which seem to be helping. Introduced BFR today to improve Hip/glute strength. Was unable to to complete shuttle. Will try to decrease occlusion on shuttle next session to see if she cont tolerate. No hip pain upon departure but did note hip fatigue.   Ewa Is progressing well towards her goals.   Pt prognosis is Good.     Pt will continue to benefit from skilled outpatient physical therapy to address the deficits listed in the problem list box on initial evaluation, provide pt/family education and to maximize pt's level of independence in the home and community environment.     Pt's spiritual, cultural and educational needs considered and pt agreeable to plan of care and goals.    Anticipated barriers to physical therapy: none    Goals:   Short Term Goals: 4 weeks (Progressing, not met)  - Pt will increase ROM to 35 R hip IR and " ER  - Pt will increase strength to 4/5 B hip abd and ext  - Decrease Pain to 4/10 as worst with all PT interventions  - Pt to self correct posture and gait with minimal cues  - Pt independent with HEP with progressions.      Long Term Goals (8 Weeks): (Progressing, not met)  - Pt will tolerate sitting for 20 min painfree  - Pt will increase strength to 5/5 B hip abd and ext  - Decrease Pain to 3/10 as worst with all functional activities  - Pt to return to 70% PLOF    Plan     Continue POC per pt tolerance progressing hip and core strengthening.     Juanita Rodríguez, PTA

## 2023-06-16 ENCOUNTER — CLINICAL SUPPORT (OUTPATIENT)
Dept: REHABILITATION | Facility: HOSPITAL | Age: 23
End: 2023-06-16
Payer: OTHER GOVERNMENT

## 2023-06-16 DIAGNOSIS — M25.551 RIGHT HIP PAIN: Primary | ICD-10-CM

## 2023-06-16 PROCEDURE — 97112 NEUROMUSCULAR REEDUCATION: CPT

## 2023-06-16 PROCEDURE — 97530 THERAPEUTIC ACTIVITIES: CPT

## 2023-06-16 NOTE — PROGRESS NOTES
"                          Physical Therapy Daily Treatment Note     Name: Ewa Welch  Clinic Number: [id number]    Therapy Diagnosis:   Encounter Diagnosis   Name Primary?    Right hip pain Yes         Physician: Leonard Becker MD    Visit Date: 6/16/2023    Physician Orders: PT Eval and Treat   Medical Diagnosis from Referral:   S73.191A (ICD-10-CM) - Tear of right acetabular labrum, initial encounter   M76.31 (ICD-10-CM) - Iliotibial band syndrome of right side      Evaluation Date: 5/5/2023  Authorization Period Expiration: 12/31/23  Plan of Care Expiration: 6/30/23  Visit # / Visits authorized: 11/ 20     Time In: 0800 am  Time Out: 0850 am   Total Billable Time: 50 minutes     Precautions: Standard      Subjective      Pt reports: some soreness after first BFR session, but not too bad.    she was compliant with home exercise program given last session.   Response to previous treatment:NA  Functional change: NA    Pain: not quantified this visit/ 10  Location: right hip      Objective   Strength: 6/6/23  Hip Left Right   Flexion 5/5 5/5   Abduction 5/5 4+/5   Extension 3/5 3/5   External Rot 5/5 5/5      Knee Left Right   Extension 5/5 5/5   Flexion 4+/5 4+/5     Ewa received therapeutic exercises to develop strength, endurance, ROM, flexibility, and core stabilization for 05 minutes including:    Dynamic cool down (scoops and quad pulls) x2 lap ea  Pt education,     Np:  Hip matrix x4 ways 2x10 ea  "Z" sit x10 B-np  Reformer hip add/HS 2x 30 sec hold B    Ewa participated in neuromuscular re-education activities to improve: Balance, Coordination, Sense, and Proprioception for 35 minutes. The following activities were included:    BFR: 30/15/15/15 80% occlusion  Clamshells YTB   S/L bridge  SL hip ABD  SL shuttle 12.5# (@70%)        Np:  HS bridges with 20" block 5" hold 4x8  Modified side plank hip dips 4x 5 reps R  Lateral walks GTB around knees-np  3D HS on SB 3x12 -np  SL RDL 3x10-np  Quadruped 4 way " "ball on wall R 3x30 each way  SLS on bosu 2x 20 B rebounder yellow med ball  Ewa participated in dynamic functional therapeutic activities to improve functional performance for 10 minutes, including:  Bike x10 min R6    Np:  Shuttle SL hip ext 25# 2x fatigue  Hip thurster with YTB around knees 5" hold 3x10  QPED hand heel rocks with grey TB lateral/inferior mob 20x earmer hip   step up 6" 3x10  Reformer abd/add 2 springs 2x15 B        Manual therapy: 00 min  Lateral/inferior joint mobs with belt  Infer    Home Exercises Provided and Patient Education Provided     Education provided:   - HEP to Go    Written Home Exercises Provided: Patient instructed to cont prior HEP.  Exercises were reviewed and Ewa was able to demonstrate them prior to the end of the session.  Ewa demonstrated good  understanding of the education provided.     See EMR under Patient Instructions for exercises provided {Belkis single:47089::"6/16/2023","prior visit"    Assessment     Ewa tolerated BFR well today and was able to get through shuttle leg press. Plan to continue progressing with hip strengthening using anaerobic BFR interventions.   Ewa Is progressing well towards her goals.   Pt prognosis is Good.     Pt will continue to benefit from skilled outpatient physical therapy to address the deficits listed in the problem list box on initial evaluation, provide pt/family education and to maximize pt's level of independence in the home and community environment.     Pt's spiritual, cultural and educational needs considered and pt agreeable to plan of care and goals.    Anticipated barriers to physical therapy: none    Goals:   Short Term Goals: 4 weeks (Progressing, not met)  - Pt will increase ROM to 35 R hip IR and ER  - Pt will increase strength to 4/5 B hip abd and ext  - Decrease Pain to 4/10 as worst with all PT interventions  - Pt to self correct posture and gait with minimal cues  - Pt independent with HEP with " progressions.      Long Term Goals (8 Weeks): (Progressing, not met)  - Pt will tolerate sitting for 20 min painfree  - Pt will increase strength to 5/5 B hip abd and ext  - Decrease Pain to 3/10 as worst with all functional activities  - Pt to return to 70% PLOF    Plan     Continue POC per pt tolerance progressing hip and core strengthening.     Lennie Eckert, PT

## 2023-06-20 ENCOUNTER — CLINICAL SUPPORT (OUTPATIENT)
Dept: REHABILITATION | Facility: HOSPITAL | Age: 23
End: 2023-06-20
Payer: OTHER GOVERNMENT

## 2023-06-20 DIAGNOSIS — M25.551 RIGHT HIP PAIN: Primary | ICD-10-CM

## 2023-06-20 PROCEDURE — 97112 NEUROMUSCULAR REEDUCATION: CPT | Mod: CQ

## 2023-06-20 PROCEDURE — 97530 THERAPEUTIC ACTIVITIES: CPT | Mod: CQ

## 2023-06-20 NOTE — PROGRESS NOTES
"                          Physical Therapy Daily Treatment Note     Name: Ewa Welch  Clinic Number: [id number]    Therapy Diagnosis:   Encounter Diagnosis   Name Primary?    Right hip pain Yes           Physician: Leonard Becker MD    Visit Date: 6/20/2023    Physician Orders: PT Eval and Treat   Medical Diagnosis from Referral:   S73.191A (ICD-10-CM) - Tear of right acetabular labrum, initial encounter   M76.31 (ICD-10-CM) - Iliotibial band syndrome of right side      Evaluation Date: 5/5/2023  Authorization Period Expiration: 12/31/23  Plan of Care Expiration: 6/30/23  Visit # / Visits authorized: 12/ 20     Time In: 0810 am  Time Out: 0900 am   Total Billable Time: 50 minutes      Precautions: Standard      Subjective      Pt reports: liking the BFR   she was compliant with home exercise program given last session.   Response to previous treatment:NA  Functional change: NA    Pain: not quantified this visit/ 10  Location: right hip      Objective   Strength: 6/6/23  Hip Left Right   Flexion 5/5 5/5   Abduction 5/5 4+/5   Extension 3/5 3/5   External Rot 5/5 5/5      Knee Left Right   Extension 5/5 5/5   Flexion 4+/5 4+/5     Ewa received therapeutic exercises to develop strength, endurance, ROM, flexibility, and core stabilization for 00 minutes including:    Dynamic cool down (scoops and quad pulls) x2 lap ea  Pt education,     Np:  Hip matrix x4 ways 2x10 ea  "Z" sit x10 B-np  Reformer hip add/HS 2x 30 sec hold B    Ewa participated in neuromuscular re-education activities to improve: Balance, Coordination, Sense, and Proprioception for 40 minutes. The following activities were included:    BFR: 30/15/15/15 80% occlusion  Clamshells YTB   S/L bridge  SL hip ABD  SL shuttle 12.5# (@70%)        Np:  HS bridges with 20" block 5" hold 4x8  Modified side plank hip dips 4x 5 reps   Lateral walks GTB around knees-np  3D HS on SB 3x12 -np  SL RDL 3x10-np  Quadruped 4 way ball on wall R 3x30 each way  SLS on " "bosu 2x 20 B rebounder yellow med ball    Ewa participated in dynamic functional therapeutic activities to improve functional performance for 10 minutes, including:  Bike x10 min R6    Np:  Shuttle SL hip ext 25# 2x fatigue  Hip thurster with YTB around knees 5" hold 3x10  QPED hand heel rocks with grey TB lateral/inferior mob 20x earmer hip   step up 6" 3x10  Reformer abd/add 2 springs 2x15 B        Manual therapy: 00 min  Lateral/inferior joint mobs with belt  Infer    Home Exercises Provided and Patient Education Provided     Education provided:   - HEP to Go    Written Home Exercises Provided: Patient instructed to cont prior HEP.  Exercises were reviewed and Ewa was able to demonstrate them prior to the end of the session.  Ewa demonstrated good  understanding of the education provided.     See EMR under Patient Instructions for exercises provided {Blank single:48846::"6/20/2023","prior visit"    Assessment     Ewa has reported in karen sessions that she has seen some improvements but her foto score did not reflect that today as her score lowered. Continued use of BFR today to improve glute/ hip strength. Tolerated shuttle better with starting off with that exercise with BFR.   Ewa Is progressing well towards her goals.   Pt prognosis is Good.     Pt will continue to benefit from skilled outpatient physical therapy to address the deficits listed in the problem list box on initial evaluation, provide pt/family education and to maximize pt's level of independence in the home and community environment.     Pt's spiritual, cultural and educational needs considered and pt agreeable to plan of care and goals.    Anticipated barriers to physical therapy: none    Goals:   Short Term Goals: 4 weeks (Progressing, not met)  - Pt will increase ROM to 35 R hip IR and ER  - Pt will increase strength to 4/5 B hip abd and ext  - Decrease Pain to 4/10 as worst with all PT interventions  - Pt to self correct " posture and gait with minimal cues  - Pt independent with HEP with progressions.      Long Term Goals (8 Weeks): (Progressing, not met)  - Pt will tolerate sitting for 20 min painfree  - Pt will increase strength to 5/5 B hip abd and ext  - Decrease Pain to 3/10 as worst with all functional activities  - Pt to return to 70% PLOF    Plan     Continue POC per pt tolerance progressing hip and core strengthening.     Juanita Rodríguez, PTA

## 2023-06-22 ENCOUNTER — CLINICAL SUPPORT (OUTPATIENT)
Dept: REHABILITATION | Facility: HOSPITAL | Age: 23
End: 2023-06-22
Payer: OTHER GOVERNMENT

## 2023-06-22 DIAGNOSIS — M25.551 RIGHT HIP PAIN: Primary | ICD-10-CM

## 2023-06-22 PROCEDURE — 97112 NEUROMUSCULAR REEDUCATION: CPT

## 2023-06-22 PROCEDURE — 97110 THERAPEUTIC EXERCISES: CPT

## 2023-06-22 PROCEDURE — 97530 THERAPEUTIC ACTIVITIES: CPT

## 2023-06-22 NOTE — PROGRESS NOTES
"                          Physical Therapy Daily Treatment Note     Name: Ewa Welch  Clinic Number: [id number]    Therapy Diagnosis:   Encounter Diagnosis   Name Primary?    Right hip pain Yes           Physician: Leonard Becker MD    Visit Date: 6/22/2023    Physician Orders: PT Eval and Treat   Medical Diagnosis from Referral:   S73.191A (ICD-10-CM) - Tear of right acetabular labrum, initial encounter   M76.31 (ICD-10-CM) - Iliotibial band syndrome of right side      Evaluation Date: 5/5/2023  Authorization Period Expiration: 12/31/23  Plan of Care Expiration: 6/30/23  Visit # / Visits authorized: 13/ 20     Time In: 355 pm  Time Out: 450 pm   Total Billable Time: 55 minutes      Precautions: Standard      Subjective      Pt reports: continues to notice improvements in strength, but still has joint pain.    she was compliant with home exercise program given last session.   Response to previous treatment:NA  Functional change: NA    Pain: not quantified this visit/ 10  Location: right hip      Objective   Strength: 6/6/23  Hip Left Right   Flexion 5/5 5/5   Abduction 5/5 4+/5   Extension 3/5 3/5   External Rot 5/5 5/5      Knee Left Right   Extension 5/5 5/5   Flexion 4+/5 4+/5     Ewa received therapeutic exercises to develop strength, endurance, ROM, flexibility, and core stabilization for 10 minutes including:    Dynamic cool down (scoops and quad pulls) x2 lap ea-np  Standing hip mobility 2x10 3 ways  Pt education on surgery vs PT     Np:  "Z" sit x10 B-np  Reformer hip add/HS 2x 30 sec hold B    Ewa participated in neuromuscular re-education activities to improve: Balance, Coordination, Sense, and Proprioception for 35 minutes. The following activities were included:    BFR: 30/15/15/15 80% occlusion  Clamshells  Bridges  SL hip ABD  SL shuttle 12.5#        Np:  HS bridges with 20" block 5" hold 4x8  Modified side plank hip dips 4x 5 reps   Lateral walks GTB around knees-np  3D HS on SB 3x12 -np  SL " "RDL 3x10-np  Quadruped 4 way ball on wall R 3x30 each way  SLS on bosu 2x 20 B rebounder yellow med ball    Ewa participated in dynamic functional therapeutic activities to improve functional performance for 10 minutes, including:  Bike x10 min R6    Np:  Shuttle SL hip ext 25# 2x fatigue  Hip thurster with YTB around knees 5" hold 3x10  QPED hand heel rocks with grey TB lateral/inferior mob 20x earmer hip   step up 6" 3x10  Reformer abd/add 2 springs 2x15 B        Manual therapy: 00 min  Lateral/inferior joint mobs with belt  Infer    Home Exercises Provided and Patient Education Provided     Education provided:   - HEP to Go    Written Home Exercises Provided: Patient instructed to cont prior HEP.  Exercises were reviewed and Ewa was able to demonstrate them prior to the end of the session.  Ewa demonstrated good  understanding of the education provided.     See EMR under Patient Instructions for exercises provided {Blank single:49120::"6/22/2023","prior visit"    Assessment     Ewa was more flared up this session likely secondary to treatment being at the end of the day. Time was spend discussing the pros and cons of hip surgery vs continuing PT. Plan to progress strengthening as tolerated.    Ewa Is progressing well towards her goals.   Pt prognosis is Good.     Pt will continue to benefit from skilled outpatient physical therapy to address the deficits listed in the problem list box on initial evaluation, provide pt/family education and to maximize pt's level of independence in the home and community environment.     Pt's spiritual, cultural and educational needs considered and pt agreeable to plan of care and goals.    Anticipated barriers to physical therapy: none    Goals:   Short Term Goals: 4 weeks (Progressing, not met)  - Pt will increase ROM to 35 R hip IR and ER  - Pt will increase strength to 4/5 B hip abd and ext  - Decrease Pain to 4/10 as worst with all PT interventions  - Pt to self " correct posture and gait with minimal cues  - Pt independent with HEP with progressions.      Long Term Goals (8 Weeks): (Progressing, not met)  - Pt will tolerate sitting for 20 min painfree  - Pt will increase strength to 5/5 B hip abd and ext  - Decrease Pain to 3/10 as worst with all functional activities  - Pt to return to 70% PLOF    Plan     Continue POC per pt tolerance progressing hip and core strengthening.     Lennie Eckert, PT

## 2023-06-27 ENCOUNTER — CLINICAL SUPPORT (OUTPATIENT)
Dept: REHABILITATION | Facility: HOSPITAL | Age: 23
End: 2023-06-27
Payer: OTHER GOVERNMENT

## 2023-06-27 DIAGNOSIS — M25.551 RIGHT HIP PAIN: Primary | ICD-10-CM

## 2023-06-27 PROCEDURE — 97112 NEUROMUSCULAR REEDUCATION: CPT

## 2023-06-27 PROCEDURE — 97530 THERAPEUTIC ACTIVITIES: CPT

## 2023-06-27 NOTE — PROGRESS NOTES
"                          Physical Therapy Daily Treatment Note     Name: Ewa Welch  Clinic Number: [id number]    Therapy Diagnosis:   Encounter Diagnosis   Name Primary?    Right hip pain Yes           Physician: Leonard Becker MD    Visit Date: 6/27/2023    Physician Orders: PT Eval and Treat   Medical Diagnosis from Referral:   S73.191A (ICD-10-CM) - Tear of right acetabular labrum, initial encounter   M76.31 (ICD-10-CM) - Iliotibial band syndrome of right side      Evaluation Date: 5/5/2023  Authorization Period Expiration: 12/31/23  Plan of Care Expiration: 7/30/23  Visit # / Visits authorized: 14/ 20     Time In: 805 am  Time Out: 845 am   Total Billable Time: 40 minutes      Precautions: Standard      Subjective      Pt reports: same pain, still deciding on surgery vs continuing PT.    she was compliant with home exercise program given last session.   Response to previous treatment:NA  Functional change: NA    Pain: not quantified this visit/ 10  Location: right hip      Objective   Strength: 6/6/23  Hip Left Right   Flexion 5/5 5/5   Abduction 5/5 4+/5   Extension 3/5 3/5   External Rot 5/5 5/5      Knee Left Right   Extension 5/5 5/5   Flexion 4+/5 4+/5     Ewa received therapeutic exercises to develop strength, endurance, ROM, flexibility, and core stabilization for 10 minutes including:    Dynamic cool down (scoops and quad pulls) x2 lap ea-np  Standing hip mobility 2x10 3 ways-np  Pt education on surgery vs PT     Np:  "Z" sit x10 B-np  Reformer hip add/HS 2x 30 sec hold B    Ewa participated in neuromuscular re-education activities to improve: Balance, Coordination, Sense, and Proprioception for 30 minutes. The following activities were included:    BFR: 30/15/15/15 80% occlusion  Clamshells ytb  Bridges-crossed leg  SL hip ABD  SL shuttle 12.5#        Np:  HS bridges with 20" block 5" hold 4x8  Modified side plank hip dips 4x 5 reps   Lateral walks GTB around knees-np  3D HS on SB 3x12 -np  SL " "RDL 3x10-np  Quadruped 4 way ball on wall R 3x30 each way  SLS on bosu 2x 20 B rebounder yellow med ball    Ewa participated in dynamic functional therapeutic activities to improve functional performance for 10 minutes, including:  Bike x10 min R4    Np:  Shuttle SL hip ext 25# 2x fatigue  Hip thurster with YTB around knees 5" hold 3x10  QPED hand heel rocks with grey TB lateral/inferior mob 20x earmer hip   step up 6" 3x10  Reformer abd/add 2 springs 2x15 B        Manual therapy: 00 min  Lateral/inferior joint mobs with belt  Infer    Home Exercises Provided and Patient Education Provided     Education provided:   - HEP to Go    Written Home Exercises Provided: Patient instructed to cont prior HEP.  Exercises were reviewed and Ewa was able to demonstrate them prior to the end of the session.  Ewa demonstrated good  understanding of the education provided.     See EMR under Patient Instructions for exercises provided {Blank single:91984::"6/27/2023","prior visit"    Assessment     Ewa was able to progress bridge and clamshell intervention again and responded better to morning treatment vs afternoon. Plan to progress strengthening as tolerated.    Ewa Is progressing well towards her goals.   Pt prognosis is Good.     Pt will continue to benefit from skilled outpatient physical therapy to address the deficits listed in the problem list box on initial evaluation, provide pt/family education and to maximize pt's level of independence in the home and community environment.     Pt's spiritual, cultural and educational needs considered and pt agreeable to plan of care and goals.    Anticipated barriers to physical therapy: none    Goals:   Short Term Goals: 4 weeks (Progressing, not met)  - Pt will increase ROM to 35 R hip IR and ER  - Pt will increase strength to 4/5 B hip abd and ext  - Decrease Pain to 4/10 as worst with all PT interventions  - Pt to self correct posture and gait with minimal cues  - Pt " independent with HEP with progressions.      Long Term Goals (8 Weeks): (Progressing, not met)  - Pt will tolerate sitting for 20 min painfree  - Pt will increase strength to 5/5 B hip abd and ext  - Decrease Pain to 3/10 as worst with all functional activities  - Pt to return to 70% PLOF    Plan     Continue POC per pt tolerance progressing hip and core strengthening.     Lennie Eckert, PT

## 2023-06-29 ENCOUNTER — CLINICAL SUPPORT (OUTPATIENT)
Dept: REHABILITATION | Facility: HOSPITAL | Age: 23
End: 2023-06-29
Payer: OTHER GOVERNMENT

## 2023-06-29 DIAGNOSIS — M25.551 RIGHT HIP PAIN: Primary | ICD-10-CM

## 2023-06-29 PROCEDURE — 97112 NEUROMUSCULAR REEDUCATION: CPT

## 2023-06-29 PROCEDURE — 97530 THERAPEUTIC ACTIVITIES: CPT

## 2023-06-29 NOTE — PROGRESS NOTES
"                          Physical Therapy Daily Treatment Note     Name: Ewa Welch  Clinic Number: [id number]    Therapy Diagnosis:   Encounter Diagnosis   Name Primary?    Right hip pain Yes           Physician: Leonard Becker MD    Visit Date: 6/29/2023    Physician Orders: PT Eval and Treat   Medical Diagnosis from Referral:   S73.191A (ICD-10-CM) - Tear of right acetabular labrum, initial encounter   M76.31 (ICD-10-CM) - Iliotibial band syndrome of right side      Evaluation Date: 5/5/2023  Authorization Period Expiration: 12/31/23  Plan of Care Expiration: 7/30/23  Visit # / Visits authorized: 15/ 20     Time In: 800 am  Time Out: 845 am   Total Billable Time: 45 minutes      Precautions: Standard      Subjective      Pt reports: only getting relief anteriorly with distraction.    she was compliant with home exercise program given last session.   Response to previous treatment:NA  Functional change: NA    Pain: not quantified this visit/ 10  Location: right hip      Objective   Strength: 6/6/23  Hip Left Right   Flexion 5/5 5/5   Abduction 5/5 4+/5   Extension 3/5 3/5   External Rot 5/5 5/5      Knee Left Right   Extension 5/5 5/5   Flexion 4+/5 4+/5     Ewa received therapeutic exercises to develop strength, endurance, ROM, flexibility, and core stabilization for 5 minutes including:    Dynamic cool down (scoops and quad pulls) x2 lap ea-np  Standing hip mobility 2x10 3 ways  Pt education    Np:  "Z" sit x10 B-np  Reformer hip add/HS 2x 30 sec hold B    Ewa participated in neuromuscular re-education activities to improve: Balance, Coordination, Sense, and Proprioception for 30 minutes. The following activities were included:    BFR: 30/15/15/15 80% occlusion  Clamshells   Bridges-crossed leg  SL hip ABD  SL shuttle 12.5#-np        Np:  HS bridges with 20" block 5" hold 4x8  Modified side plank hip dips 4x 5 reps   Lateral walks GTB around knees-np  3D HS on SB 3x12 -np  SL RDL 3x10-np  Quadruped 4 " "way ball on wall R 3x30 each way  SLS on bosu 2x 20 B rebounder yellow med ball    Ewa participated in dynamic functional therapeutic activities to improve functional performance for 10 minutes, including:  Bike x10 min R4    Np:  Shuttle SL hip ext 25# 2x fatigue  Hip thurster with YTB around knees 5" hold 3x10  QPED hand heel rocks with grey TB lateral/inferior mob 20x earmer hip   step up 6" 3x10  Reformer abd/add 2 springs 2x15 B        Manual therapy: 00 min  Lateral/inferior joint mobs with belt  Infer    Home Exercises Provided and Patient Education Provided     Education provided:   - HEP to Go    Written Home Exercises Provided: Patient instructed to cont prior HEP.  Exercises were reviewed and Ewa was able to demonstrate them prior to the end of the session.  Ewa demonstrated good  understanding of the education provided.     See EMR under Patient Instructions for exercises provided {Blank single:84840::"6/29/2023","prior visit"    Assessment     Ewa did better with skipping shuttle intervention with BFR today. Some relief noted with long axis distraction. Plan to progress strengthening as tolerated until follow up with MD.  Ewa Is progressing well towards her goals.   Pt prognosis is Good.     Pt will continue to benefit from skilled outpatient physical therapy to address the deficits listed in the problem list box on initial evaluation, provide pt/family education and to maximize pt's level of independence in the home and community environment.     Pt's spiritual, cultural and educational needs considered and pt agreeable to plan of care and goals.    Anticipated barriers to physical therapy: none    Goals:   Short Term Goals: 4 weeks (Progressing, not met)  - Pt will increase ROM to 35 R hip IR and ER  - Pt will increase strength to 4/5 B hip abd and ext  - Decrease Pain to 4/10 as worst with all PT interventions  - Pt to self correct posture and gait with minimal cues  - Pt independent " with HEP with progressions.      Long Term Goals (8 Weeks): (Progressing, not met)  - Pt will tolerate sitting for 20 min painfree  - Pt will increase strength to 5/5 B hip abd and ext  - Decrease Pain to 3/10 as worst with all functional activities  - Pt to return to 70% PLOF    Plan     Continue POC per pt tolerance progressing hip and core strengthening.     Lennie Eckert, PT

## 2023-07-05 ENCOUNTER — CLINICAL SUPPORT (OUTPATIENT)
Dept: REHABILITATION | Facility: HOSPITAL | Age: 23
End: 2023-07-05
Payer: OTHER GOVERNMENT

## 2023-07-05 DIAGNOSIS — M25.551 RIGHT HIP PAIN: Primary | ICD-10-CM

## 2023-07-05 PROCEDURE — 97530 THERAPEUTIC ACTIVITIES: CPT | Mod: CQ

## 2023-07-05 PROCEDURE — 97112 NEUROMUSCULAR REEDUCATION: CPT | Mod: CQ

## 2023-07-05 NOTE — PROGRESS NOTES
"                          Physical Therapy Daily Treatment Note     Name: Ewa Welch  Clinic Number: [id number]    Therapy Diagnosis:   Encounter Diagnosis   Name Primary?    Right hip pain Yes           Physician: Leonard Becker MD    Visit Date: 7/5/2023    Physician Orders: PT Eval and Treat   Medical Diagnosis from Referral:   S73.191A (ICD-10-CM) - Tear of right acetabular labrum, initial encounter   M76.31 (ICD-10-CM) - Iliotibial band syndrome of right side      Evaluation Date: 5/5/2023  Authorization Period Expiration: 12/31/23  Plan of Care Expiration: 7/30/23  Visit # / Visits authorized: 16/ 20     Time In: 800 am  Time Out: 0900 am   Total Billable Time: 23 minutes      Precautions: Standard      Subjective      Pt reports:Have not been doing much so hip is good   she was compliant with home exercise program given last session.   Response to previous treatment:NA  Functional change: NA    Pain: not quantified this visit/ 10  Location: right hip      Objective   Strength: 6/6/23  Hip Left Right   Flexion 5/5 5/5   Abduction 5/5 4+/5   Extension 3/5 3/5   External Rot 5/5 5/5      Knee Left Right   Extension 5/5 5/5   Flexion 4+/5 4+/5     Ewa received therapeutic exercises to develop strength, endurance, ROM, flexibility, and core stabilization for 08 minutes including:    Dynamic cool down (scoops and quad pulls) x2 lap ea-np  Standing hip mobility 2x10 3 ways-NP  Hand heel rock with sport band for hip distraction fwd and lateral     Np:  "Z" sit x10 B-np  Reformer hip add/HS 2x 30 sec hold B    Ewa participated in neuromuscular re-education activities to improve: Balance, Coordination, Sense, and Proprioception for  37 minutes. The following activities were included:    BFR: 30/15/15/15 80% occlusion  Clamshells   Bridges-crossed leg  SL hip ABD  SL shuttle 12.5#-        Np:  HS bridges with 20" block 5" hold 4x8  Modified side plank hip dips 4x 5 reps   Lateral walks GTB around knees-np  3D HS " "on SB 3x12 -np  SL RDL 3x10-np  Quadruped 4 way ball on wall R 3x30 each way  SLS on bosu 2x 20 B rebounder yellow med ball    Ewa participated in dynamic functional therapeutic activities to improve functional performance for 15 minutes, including:  Bike x10 min R4  RDL 4x8     Np:  Shuttle SL hip ext 25# 2x fatigue  Hip thurster with YTB around knees 5" hold 3x10  QPED hand heel rocks with grey TB lateral/inferior mob 20x earmer hip   step up 6" 3x10  Reformer abd/add 2 springs 2x15 B        Manual therapy: 00 min  Lateral/inferior joint mobs with belt  Infer    Home Exercises Provided and Patient Education Provided     Education provided:   - HEP to Go    Written Home Exercises Provided: Patient instructed to cont prior HEP.  Exercises were reviewed and Ewa was able to demonstrate them prior to the end of the session.  Ewa demonstrated good  understanding of the education provided.     See EMR under Patient Instructions for exercises provided {Blank single:47576::"7/5/2023","prior visit"    Assessment     Ewa was able to add back in shuttle with BFR. Hip mobility exercise helped to relief symptoms feeling today. Felt like hip needed to pop.   Ewa Is progressing well towards her goals.   Pt prognosis is Good.     Pt will continue to benefit from skilled outpatient physical therapy to address the deficits listed in the problem list box on initial evaluation, provide pt/family education and to maximize pt's level of independence in the home and community environment.     Pt's spiritual, cultural and educational needs considered and pt agreeable to plan of care and goals.    Anticipated barriers to physical therapy: none    Goals:   Short Term Goals: 4 weeks (Progressing, not met)  - Pt will increase ROM to 35 R hip IR and ER  - Pt will increase strength to 4/5 B hip abd and ext  - Decrease Pain to 4/10 as worst with all PT interventions  - Pt to self correct posture and gait with minimal cues  - Pt " independent with HEP with progressions.      Long Term Goals (8 Weeks): (Progressing, not met)  - Pt will tolerate sitting for 20 min painfree  - Pt will increase strength to 5/5 B hip abd and ext  - Decrease Pain to 3/10 as worst with all functional activities  - Pt to return to 70% PLOF    Plan     Continue POC per pt tolerance progressing hip and core strengthening.     Juanita Rodríguez, PTA

## 2023-07-07 ENCOUNTER — CLINICAL SUPPORT (OUTPATIENT)
Dept: REHABILITATION | Facility: HOSPITAL | Age: 23
End: 2023-07-07
Payer: OTHER GOVERNMENT

## 2023-07-07 DIAGNOSIS — M25.551 RIGHT HIP PAIN: Primary | ICD-10-CM

## 2023-07-07 PROCEDURE — 97112 NEUROMUSCULAR REEDUCATION: CPT

## 2023-07-07 PROCEDURE — 97110 THERAPEUTIC EXERCISES: CPT

## 2023-07-07 NOTE — PROGRESS NOTES
"                          Physical Therapy Daily Treatment Note     Name: Ewa Welch  Clinic Number: [id number]    Therapy Diagnosis:   Encounter Diagnosis   Name Primary?    Right hip pain Yes           Physician: Leonard Becker MD    Visit Date: 7/7/2023    Physician Orders: PT Eval and Treat   Medical Diagnosis from Referral:   S73.191A (ICD-10-CM) - Tear of right acetabular labrum, initial encounter   M76.31 (ICD-10-CM) - Iliotibial band syndrome of right side      Evaluation Date: 5/5/2023  Authorization Period Expiration: 12/31/23  Plan of Care Expiration: 7/30/23  Visit # / Visits authorized: 17/ 20     Time In: 800 am  Time Out:  850 am  Total Billable Time: 50 minutes      Precautions: Standard      Subjective      Pt reports:her hamstring has been tight.   she was compliant with home exercise program given last session.   Response to previous treatment:NA  Functional change: NA    Pain: not quantified this visit/ 10  Location: right hip      Objective   Strength: 6/6/23  Hip Left Right   Flexion 5/5 5/5   Abduction 5/5 4+/5   Extension 3/5 3/5   External Rot 5/5 5/5      Knee Left Right   Extension 5/5 5/5   Flexion 4+/5 4+/5     Ewa received therapeutic exercises to develop strength, endurance, ROM, flexibility, and core stabilization for 10 minutes including:    Dynamic cool down (scoops and quad pulls) x2 lap ea-  Jump rope  (yellow) 4x 30 sec  Inch worm- attempted  Standing hip mobility 2x10 3 ways-NP  Hand heel rock with sport band for hip distraction fwd and lateral -np    Np:  "Z" sit x10 B-np  Reformer hip add/HS 2x 30 sec hold B    Ewa participated in neuromuscular re-education activities to improve: Balance, Coordination, Sense, and Proprioception for  40  minutes. The following activities were included:    BFR: 30/15/15/15 80% occlusion  Clamshells   Bridges-crossed leg  SL hip ABD  SL shuttle 12.5#- (60%)      Np:  HS bridges with 20" block 5" hold 4x8  Modified side plank hip dips 4x 5 " "reps   Lateral walks GTB around knees-np  3D HS on SB 3x12 -np  SL RDL 3x10-np  Quadruped 4 way ball on wall R 3x30 each way  SLS on bosu 2x 20 B rebounder yellow med ball    Ewa participated in dynamic functional therapeutic activities to improve functional performance for 00 minutes, including:  NP:  Bike x10 min R4  RDL 4x8   Shuttle SL hip ext 25# 2x fatigue  Hip thurster with YTB around knees 5" hold 3x10  QPED hand heel rocks with grey TB lateral/inferior mob 20x earmer hip   step up 6" 3x10  Reformer abd/add 2 springs 2x15 B        Manual therapy: 00 min  Lateral/inferior joint mobs with belt  Infer    Home Exercises Provided and Patient Education Provided     Education provided:   - HEP to Go    Written Home Exercises Provided: Patient instructed to cont prior HEP.  Exercises were reviewed and Ewa was able to demonstrate them prior to the end of the session.  Ewa demonstrated good  understanding of the education provided.     See EMR under Patient Instructions for exercises provided {Blank single:74690::"7/7/2023","prior visit"    Assessment     Ewa having a hard time progressing BFR secondary to L hip pain and today more limited by HS tension. Pt plans to reach out to MD to continue surgical discussion. Plan to progress strengthening as tolerated.     Ewa Is progressing well towards her goals.   Pt prognosis is Good.     Pt will continue to benefit from skilled outpatient physical therapy to address the deficits listed in the problem list box on initial evaluation, provide pt/family education and to maximize pt's level of independence in the home and community environment.     Pt's spiritual, cultural and educational needs considered and pt agreeable to plan of care and goals.    Anticipated barriers to physical therapy: none    Goals:   Short Term Goals: 4 weeks (Progressing, not met)  - Pt will increase ROM to 35 R hip IR and ER  - Pt will increase strength to 4/5 B hip abd and ext  - " Decrease Pain to 4/10 as worst with all PT interventions  - Pt to self correct posture and gait with minimal cues  - Pt independent with HEP with progressions.      Long Term Goals (8 Weeks): (Progressing, not met)  - Pt will tolerate sitting for 20 min painfree  - Pt will increase strength to 5/5 B hip abd and ext  - Decrease Pain to 3/10 as worst with all functional activities  - Pt to return to 70% PLOF    Plan     Continue POC per pt tolerance progressing hip and core strengthening.     Lennie Eckert, PT

## 2023-07-11 ENCOUNTER — CLINICAL SUPPORT (OUTPATIENT)
Dept: REHABILITATION | Facility: HOSPITAL | Age: 23
End: 2023-07-11
Payer: OTHER GOVERNMENT

## 2023-07-11 DIAGNOSIS — M25.551 RIGHT HIP PAIN: Primary | ICD-10-CM

## 2023-07-11 PROCEDURE — 97140 MANUAL THERAPY 1/> REGIONS: CPT

## 2023-07-11 PROCEDURE — 97112 NEUROMUSCULAR REEDUCATION: CPT

## 2023-07-11 PROCEDURE — 97110 THERAPEUTIC EXERCISES: CPT

## 2023-07-11 NOTE — PROGRESS NOTES
"                          Physical Therapy Daily Treatment Note     Name: Ewa Welch  Clinic Number: [id number]    Therapy Diagnosis:   Encounter Diagnosis   Name Primary?    Right hip pain Yes           Physician: Leonard Becker MD    Visit Date: 7/11/2023    Physician Orders: PT Eval and Treat   Medical Diagnosis from Referral:   S73.191A (ICD-10-CM) - Tear of right acetabular labrum, initial encounter   M76.31 (ICD-10-CM) - Iliotibial band syndrome of right side      Evaluation Date: 5/5/2023  Authorization Period Expiration: 12/31/23  Plan of Care Expiration: 7/30/23  Visit # / Visits authorized: 18/ 20     Time In: 805 am  Time Out:  859 am  Total Billable Time: 55 minutes      Precautions: Standard      Subjective      Pt reports: not having much pain at all over the past week. Activities that are aggravating include long distance walking, excessive stairs, prolonged sitting. She states this is the strongest her hip has felt in 4 years.      she was compliant with home exercise program given last session.   Response to previous treatment:NA  Functional change: NA    Pain: not quantified this visit/ 10  Location: right hip      Objective   Strength: 6/6/23  Hip Left Right   Flexion 5/5 5/5   Abduction 5/5 4+/5   Extension 3/5 3/5   External Rot 5/5 5/5      Knee Left Right   Extension 5/5 5/5   Flexion 4+/5 4+/5     Ewa received therapeutic exercises to develop strength, endurance, ROM, flexibility, and core stabilization for 15 minutes including:    Hip reassessment  Side plank 30" hold to RTB clamshell x10 -- 2 rounds B    NP:  Dynamic cool down (scoops and quad pulls) x2 lap ea-  Jump rope  (yellow) 4x 30 sec  Inch worm- attempted  Standing hip mobility 2x10 3 ways-NP  Hand heel rock with sport band for hip distraction fwd and lateral -np  "Z" sit x10 B-np  Reformer hip add/HS 2x 30 sec hold B    Ewa participated in neuromuscular re-education activities to improve: Balance, Coordination, Sense, and " "Proprioception for  30 minutes. The following activities were included:    SL RDL c 15# KB 3x6 B   Side-lying wall alphabets (axial compression into ball) x 2 rounds   SL glut bridge on 12" plyo box 2x8 B   Plank on elbows 2x60"     Np:  HS bridges with 20" block 5" hold 4x8  Modified side plank hip dips 4x 5 reps   Lateral walks GTB around knees-np  3D HS on SB 3x12 -np  SL RDL 3x10-np  Quadruped 4 way ball on wall R 3x30 each way  SLS on bosu 2x 20 B rebounder yellow med ball    BFR: 30/15/15/15 80% occlusion  Clamshells   Bridges-crossed leg  SL hip ABD  SL shuttle 12.5#- (60%)    Ewa participated in dynamic functional therapeutic activities to improve functional performance for 00 minutes, including:  NP:  Bike x10 min R4  RDL 4x8   Shuttle SL hip ext 25# 2x fatigue  Hip thurster with YTB around knees 5" hold 3x10  QPED hand heel rocks with grey TB lateral/inferior mob 20x earmer hip   step up 6" 3x10  Reformer abd/add 2 springs 2x15 B        Manual therapy: 10 min  Lateral/inferior FA joint mobs with belt  Hamstring contract-relax       Home Exercises Provided and Patient Education Provided     Education provided:   - HEP to Go    Written Home Exercises Provided: Patient instructed to cont prior HEP.  Exercises were reviewed and Ewa was able to demonstrate them prior to the end of the session.  Ewa demonstrated good  understanding of the education provided.     See EMR under Patient Instructions for exercises provided {Blank single:53023::"7/11/2023","prior visit"    Assessment     Ewa demonstrates mild hamstring tightness on the right side, improved with contract-relax stretching. Joint mobility is fairly symmetrical as Ewa benefited from inferior and posterior hip joint mobilizations. She endorsed an abnormal feeling at the anterior medial right hip with most interventions, although she did not states symptoms were painful. It was explained to Ewa that although she feels a difference side to " side, she has been progressing with exercises and performing ADLs without increase in symptoms, which is a positive sign.       Ewa Is progressing well towards her goals.   Pt prognosis is Good.     Pt will continue to benefit from skilled outpatient physical therapy to address the deficits listed in the problem list box on initial evaluation, provide pt/family education and to maximize pt's level of independence in the home and community environment.     Pt's spiritual, cultural and educational needs considered and pt agreeable to plan of care and goals.    Anticipated barriers to physical therapy: none    Goals:   Short Term Goals: 4 weeks (Progressing, not met)  - Pt will increase ROM to 35 R hip IR and ER  - Pt will increase strength to 4/5 B hip abd and ext  - Decrease Pain to 4/10 as worst with all PT interventions  - Pt to self correct posture and gait with minimal cues  - Pt independent with HEP with progressions.      Long Term Goals (8 Weeks): (Progressing, not met)  - Pt will tolerate sitting for 20 min painfree  - Pt will increase strength to 5/5 B hip abd and ext  - Decrease Pain to 3/10 as worst with all functional activities  - Pt to return to 70% PLOF    Plan     Continue POC per pt tolerance progressing hip and core strengthening.     Elvia Pierson, PT

## 2023-07-13 ENCOUNTER — CLINICAL SUPPORT (OUTPATIENT)
Dept: REHABILITATION | Facility: HOSPITAL | Age: 23
End: 2023-07-13
Payer: OTHER GOVERNMENT

## 2023-07-13 DIAGNOSIS — M25.551 RIGHT HIP PAIN: Primary | ICD-10-CM

## 2023-07-13 PROCEDURE — 97530 THERAPEUTIC ACTIVITIES: CPT | Mod: CQ

## 2023-07-13 PROCEDURE — 97112 NEUROMUSCULAR REEDUCATION: CPT | Mod: CQ

## 2023-07-13 PROCEDURE — 97110 THERAPEUTIC EXERCISES: CPT | Mod: CQ

## 2023-07-13 NOTE — PROGRESS NOTES
"                          Physical Therapy Daily Treatment Note     Name: Ewa Welch  Clinic Number: [id number]    Therapy Diagnosis:   Encounter Diagnosis   Name Primary?    Right hip pain Yes           Physician: Leonard Becker MD    Visit Date: 7/13/2023    Physician Orders: PT Eval and Treat   Medical Diagnosis from Referral:   S73.191A (ICD-10-CM) - Tear of right acetabular labrum, initial encounter   M76.31 (ICD-10-CM) - Iliotibial band syndrome of right side      Evaluation Date: 5/5/2023  Authorization Period Expiration: 12/31/23  Plan of Care Expiration: 7/30/23  Visit # / Visits authorized: 19/ 20     Time In: 810 am  Time Out:  0900 am  Total Billable Time: 50 minutes      Precautions: Standard      Subjective      Pt reports: hip is doing good. Doing exercises Mo gave me.     she was compliant with home exercise program given last session.   Response to previous treatment:NA  Functional change: NA    Pain: not quantified this visit/ 10  Location: right hip      Objective   Strength: 6/6/23  Hip Left Right   Flexion 5/5 5/5   Abduction 5/5 4+/5   Extension 3/5 3/5   External Rot 5/5 5/5      Knee Left Right   Extension 5/5 5/5   Flexion 4+/5 4+/5     Ewa received therapeutic exercises to develop strength, endurance, ROM, flexibility, and core stabilization for 15 minutes including:    Dynamic warm up  Side plank 30" hold to RTB clamshell x10 -- 3 rounds B  Hand heel rock with sport band for hip distraction fwd and lateral     NP:  Dynamic cool down (scoops and quad pulls) x2 lap ea-  Jump rope  (yellow) 4x 30 sec  Inch worm- attempted  Standing hip mobility 2x10 3 ways-NP  "Z" sit x10 B-np  Reformer hip add/HS 2x 30 sec hold B    Ewa participated in neuromuscular re-education activities to improve: Balance, Coordination, Sense, and Proprioception for  22 minutes. The following activities were included:    SL RDL c 15# KB 3x6 B   QPED hip circles 2x fatigue CW/CCW  SL glut bridge on 20" plyo box " "2x8 B   Plank on elbows with hip ext 2x60"       Np:  HS bridges with 20" block 5" hold 4x8  Modified side plank hip dips 4x 5 reps   Lateral walks GTB around knees-np  3D HS on SB 3x12 -np  SL RDL 3x10-np  Quadruped 4 way ball on wall R 3x30 each way  SLS on bosu 2x 20 B rebounder yellow med ball    BFR: 30/15/15/15 80% occlusion  Clamshells   Bridges-crossed leg  SL hip ABD  SL shuttle 12.5#- (60%)    Ewa participated in dynamic functional therapeutic activities to improve functional performance for 13 minutes, including:  Elliptical x 8 min lvl 4  Pull through with sport cord 2x15      NP:  Bike x10 min R4  RDL 4x8   Shuttle SL hip ext 25# 2x fatigue  Hip thurster with YTB around knees 5" hold 3x10  QPED hand heel rocks with grey TB lateral/inferior mob 20x earmer hip   step up 6" 3x10  Reformer abd/add 2 springs 2x15 B        Manual therapy: 00 min  Lateral/inferior FA joint mobs with belt  Hamstring contract-relax       Home Exercises Provided and Patient Education Provided     Education provided:   - HEP to Go    Written Home Exercises Provided: Patient instructed to cont prior HEP.  Exercises were reviewed and Ewa was able to demonstrate them prior to the end of the session.  Ewa demonstrated good  understanding of the education provided.     See EMR under Patient Instructions for exercises provided {Blank single:30019::"7/13/2023","prior visit"    Assessment     Ewa worked on a lot of posterior chain strengthening today. Would benefit on including interventions in a transverse plane next session.       Ewa Is progressing well towards her goals.   Pt prognosis is Good.     Pt will continue to benefit from skilled outpatient physical therapy to address the deficits listed in the problem list box on initial evaluation, provide pt/family education and to maximize pt's level of independence in the home and community environment.     Pt's spiritual, cultural and educational needs considered and pt " agreeable to plan of care and goals.    Anticipated barriers to physical therapy: none    Goals:   Short Term Goals: 4 weeks (Progressing, not met)  - Pt will increase ROM to 35 R hip IR and ER  - Pt will increase strength to 4/5 B hip abd and ext  - Decrease Pain to 4/10 as worst with all PT interventions  - Pt to self correct posture and gait with minimal cues  - Pt independent with HEP with progressions.      Long Term Goals (8 Weeks): (Progressing, not met)  - Pt will tolerate sitting for 20 min painfree  - Pt will increase strength to 5/5 B hip abd and ext  - Decrease Pain to 3/10 as worst with all functional activities  - Pt to return to 70% PLOF    Plan     Continue POC per pt tolerance progressing hip and core strengthening.     Juanita Rodríguez, PTA

## 2023-07-18 ENCOUNTER — CLINICAL SUPPORT (OUTPATIENT)
Dept: REHABILITATION | Facility: HOSPITAL | Age: 23
End: 2023-07-18
Payer: OTHER GOVERNMENT

## 2023-07-18 DIAGNOSIS — M25.551 RIGHT HIP PAIN: Primary | ICD-10-CM

## 2023-07-18 PROCEDURE — 97110 THERAPEUTIC EXERCISES: CPT

## 2023-07-18 PROCEDURE — 97530 THERAPEUTIC ACTIVITIES: CPT

## 2023-07-18 NOTE — PROGRESS NOTES
"                          Physical Therapy Daily Treatment Note     Name: Ewa Welch  Clinic Number: [id number]    Therapy Diagnosis:   Encounter Diagnosis   Name Primary?    Right hip pain Yes           Physician: Leonard Becker MD    Visit Date: 7/18/2023    Physician Orders: PT Eval and Treat   Medical Diagnosis from Referral:   S73.191A (ICD-10-CM) - Tear of right acetabular labrum, initial encounter   M76.31 (ICD-10-CM) - Iliotibial band syndrome of right side      Evaluation Date: 5/5/2023  Authorization Period Expiration: 12/31/23  Plan of Care Expiration: 7/30/23  Visit # / Visits authorized: 20/ 20     Time In: 400 pm  Time Out:  455 pm  Total Billable Time: 30 minutes      Precautions: Standard      Subjective      Pt reports: continues to notice improvements with strength, but made aware of 20/20 visit approved/used and would like to hold off on PT until surgery.      she was compliant with home exercise program given last session.   Response to previous treatment:NA  Functional change: NA    Pain: not quantified this visit/ 10  Location: right hip      Objective   Strength: 7/19/23  Hip Left Right   Flexion 5/5 5/5   Abduction 5/5 4+/5   Extension 3+/5 3+/5   External Rot 5/5 5/5      Knee Left Right   Extension 5/5 5/5   Flexion 5/5 5/5     Ewa received therapeutic exercises to develop strength, endurance, ROM, flexibility, and core stabilization for 25 minutes including:    Dynamic warm up  Side plank 30" hold to RTB clamshell x10 -- 3 rounds B- reviewed for HEP  Hand heel rock with sport band for hip distraction fwd and lateral -np  HEP review, pt education on surgery/post op PT x20 min    Ewa participated in neuromuscular re-education activities to improve: Balance, Coordination, Sense, and Proprioception for  00 minutes. The following activities were included          Ewa participated in dynamic functional therapeutic activities to improve functional performance for 25 minutes, " "including:  Bike x 10 min lvl 4  Pull through with sport cord 2x15-np  STS 20 " box 3x10 holding 5#  RDL 3x10 holding 10#  Shuttle SL hip ext 25# 2x fatigue-np  Hip thurster with YTB around knees 5" hold 3x10-np  QPED hand heel rocks with grey TB lateral/inferior mob 20x earmer hip - reviewed for HEP  step up 6" 3x10-np  Reformer abd/add 2 springs 2x15 B-np      Home Exercises Provided and Patient Education Provided     Education provided:   - HEP to Go    Written Home Exercises Provided: Patient instructed to cont prior HEP.  Exercises were reviewed and Ewa was able to demonstrate them prior to the end of the session.  Ewa demonstrated good  understanding of the education provided.     See EMR under Patient Instructions for exercises provided {Blank single:55034::"7/18/2023","prior visit"    Assessment     Significant time was taken this session to discuss maintenance of strength gains independently of PT secondary to using all 20/20 visits through [facility]. She is meeting with MD soon to discuss surgery and would like to hold off on seeking more PT visits through insurance until surgery. Pt demonstrated good understanding of HEP to maintain functional gains and was encouraged to call/email with any questions in the interim.       Ewa Is progressing well towards her goals.   Pt prognosis is Good.     Pt will continue to benefit from skilled outpatient physical therapy to address the deficits listed in the problem list box on initial evaluation, provide pt/family education and to maximize pt's level of independence in the home and community environment.     Pt's spiritual, cultural and educational needs considered and pt agreeable to plan of care and goals.    Anticipated barriers to physical therapy: none    Goals:   Short Term Goals: 4 weeks (Progressing, not met)  - Pt will increase ROM to 35 R hip IR and ER  - Pt will increase strength to 4/5 B hip abd and ext  - Decrease Pain to 4/10 as worst with all " PT interventions  - Pt to self correct posture and gait with minimal cues  - Pt independent with HEP with progressions.      Long Term Goals (8 Weeks): (Progressing, not met)  - Pt will tolerate sitting for 20 min painfree  - Pt will increase strength to 5/5 B hip abd and ext  - Decrease Pain to 3/10 as worst with all functional activities  - Pt to return to 70% PLOF    Plan     Continue POC per pt tolerance progressing hip and core strengthening.     Lennie Eckert, PT

## 2023-08-08 ENCOUNTER — TELEPHONE (OUTPATIENT)
Dept: SPORTS MEDICINE | Facility: CLINIC | Age: 23
End: 2023-08-08
Payer: OTHER GOVERNMENT

## 2023-08-08 ENCOUNTER — OFFICE VISIT (OUTPATIENT)
Dept: SPORTS MEDICINE | Facility: CLINIC | Age: 23
End: 2023-08-08
Payer: OTHER GOVERNMENT

## 2023-08-08 VITALS — BODY MASS INDEX: 21.86 KG/M2 | WEIGHT: 136 LBS | HEIGHT: 66 IN

## 2023-08-08 DIAGNOSIS — S73.191A TEAR OF RIGHT ACETABULAR LABRUM, INITIAL ENCOUNTER: Primary | ICD-10-CM

## 2023-08-08 DIAGNOSIS — M25.851 FEMOROACETABULAR IMPINGEMENT OF RIGHT HIP: ICD-10-CM

## 2023-08-08 PROCEDURE — 99215 OFFICE O/P EST HI 40 MIN: CPT | Mod: S$PBB,,, | Performed by: STUDENT IN AN ORGANIZED HEALTH CARE EDUCATION/TRAINING PROGRAM

## 2023-08-08 PROCEDURE — 99213 OFFICE O/P EST LOW 20 MIN: CPT | Mod: PBBFAC | Performed by: STUDENT IN AN ORGANIZED HEALTH CARE EDUCATION/TRAINING PROGRAM

## 2023-08-08 PROCEDURE — 99999 PR PBB SHADOW E&M-EST. PATIENT-LVL III: CPT | Mod: PBBFAC,,, | Performed by: STUDENT IN AN ORGANIZED HEALTH CARE EDUCATION/TRAINING PROGRAM

## 2023-08-08 PROCEDURE — 99215 PR OFFICE/OUTPT VISIT, EST, LEVL V, 40-54 MIN: ICD-10-PCS | Mod: S$PBB,,, | Performed by: STUDENT IN AN ORGANIZED HEALTH CARE EDUCATION/TRAINING PROGRAM

## 2023-08-08 PROCEDURE — 99999 PR PBB SHADOW E&M-EST. PATIENT-LVL III: ICD-10-PCS | Mod: PBBFAC,,, | Performed by: STUDENT IN AN ORGANIZED HEALTH CARE EDUCATION/TRAINING PROGRAM

## 2023-08-08 NOTE — PROGRESS NOTES
Subjective:          Chief Complaint: Ewa Welch is a 23 y.o. female who had concerns including Follow-up of the Right Hip.    HPI 08/08/2023:  Ewa Welch is a 23 y.o. female today for follow-up for right hip.  Her pain has begun to return.  She still has anterior pain that is worse with hip flexion.  She feels like her progress with physical therapy has plateaued and she is becoming quite frustrated with her symptoms.  She is interested in discussing surgical intervention.    HPI 6/8/23:  Ewa Welch is a 23 y.o. female returns to clinic for follow up evaluation for her right hip pain. Since her last visit, she has received a corticosteroid injection with Dr. Rudd on 4/27/23 that she feels did give her some relief. She notes that her pain is still presents however it is decreased in intensity. She has been in formal physical therapy at the [facility] 2x week. She notes that she does not have any increased pain at night or difficulty sleeping.       HPI 4/27/23  Ewa Welch is a 22 y.o. female [employer] and former [employer] that presents for evaluation for her right hip pain. She is referred by Dr. Hubbard. She states that her pain started about 3-4 years ago while training in the .  There is no specific inciting injury, event, or trauma.  She rates her pain as a 9/10 at worst over the anterior groin as well as the lateral aspect of the right hip. She endorses a positive C-sign.  The pain increases with hip flexion.  She reports having catching and popping sensations in the hip after long periods of sitting. She has completed multiple courses of physical therapy while in the  with no relief. She also states that she has received an injection to the lateral aspect of the hip with some relief.       Past Medical History:   Diagnosis Date    Generalized anxiety disorder 03/29/2016    Infected pilonidal cyst 4/5/2017    Seasonal allergic rhinitis due to pollen 4/4/2013    Self-harming behavior      Tonsillar hypertrophy 2/12/2018       Current Outpatient Medications on File Prior to Visit   Medication Sig Dispense Refill    fluticasone (FLONASE) 50 mcg/actuation nasal spray 1 spray (50 mcg total) by Each Nare route once daily. (Patient not taking: Reported on 6/8/2023) 16 g 11    loratadine (CLARITIN) 10 mg tablet Take 1 tablet (10 mg total) by mouth once daily. (Patient taking differently: Take 10 mg by mouth daily as needed. ) 30 tablet 3     No current facility-administered medications on file prior to visit.       Past Surgical History:   Procedure Laterality Date    ADENOIDECTOMY  02/12/2018    Dr. Bernard    OTHER SURGICAL HISTORY Left     toenail removal big toe    TONSILLECTOMY  02/12/2018    Dr. Bernard    WISDOM TOOTH EXTRACTION Bilateral        Family History   Problem Relation Age of Onset    Allergies Mother     Eczema Sister     Cancer Paternal Grandmother     Cirrhosis Paternal Grandmother     No Known Problems Father        Social History     Socioeconomic History    Marital status:    Tobacco Use    Smoking status: Never    Smokeless tobacco: Never   Substance and Sexual Activity    Alcohol use: Yes     Comment: Occasional    Drug use: No    Sexual activity: Yes     Partners: Female, Male   Social History Narrative    Lives with mom, dad and sister Erika.    She is planning to go to the [employer] next year.    Sexually relationship with female.              Review of Systems   Constitutional: Negative.   HENT: Negative.     Eyes: Negative.    Cardiovascular: Negative.    Respiratory: Negative.     Endocrine: Negative.    Hematologic/Lymphatic: Negative.    Skin: Negative.    Musculoskeletal:  Positive for joint pain (right hip) and muscle weakness. Negative for arthritis, falls, gout, joint swelling, muscle cramps, myalgias, neck pain and stiffness.   Neurological: Negative.    Psychiatric/Behavioral: Negative.     Allergic/Immunologic: Negative.                    Objective:         General: Ewa is well-developed, well-nourished, appears stated age, in no acute distress, alert and oriented to time, place and person.     General    Nursing note and vitals reviewed.  Constitutional: She is oriented to person, place, and time. She appears well-developed and well-nourished. No distress.   HENT:   Head: Normocephalic and atraumatic.   Nose: Nose normal.   Eyes: EOM are normal.   Cardiovascular:  Intact distal pulses.            Pulmonary/Chest: Effort normal. No respiratory distress.   Neurological: She is alert and oriented to person, place, and time.   Psychiatric: She has a normal mood and affect. Her behavior is normal. Judgment and thought content normal.           Right Knee Exam     Inspection   Alignment:  normal  Effusion: absent    Left Knee Exam     Inspection   Alignment:  normal  Effusion: absent    Right Hip Exam     Inspection   Scars: absent  Swelling: absent  Bruising: absent  No deformity of hip.  Quadriceps Atrophy:  Negative  Erythema: absent    Tenderness   The patient tender to palpation of the rectus insertion.    Range of Motion   Abduction:  45   Adduction:  30   Extension:  0   Flexion:  100   External rotation:  70   Internal rotation:  15     Tests   Pain w/ forced internal rotation (LIZETTE): absent  Pain w/ forced external rotation (FADIR): present  Cynthia: negative  Circumduction test: negative  Stinchfield test: positive  Log Roll: negative  Snapping Hip (internal): negative    Other   Sensation: normal  Left Hip Exam     Inspection   Scars: absent  Swelling: absent  No deformity of hip.  Quadriceps Atrophy:  negative  Erythema: absent  Bruising: absent    Range of Motion   Abduction:  45   Adduction:  30   Extension:  0   Flexion:  120   External rotation:  70   Internal rotation: 30     Tests   Pain w/ forced internal rotation (LIZETTE): absent  Pain w/ forced external rotation (FADIR): absent  Cynthia: negative  Trendelenburg Test: negative  Circumduction test:  negative  Stinchfield test: negative  Log Roll: negative  Snapping Hip (internal): negative    Other   Sensation: normal          Muscle Strength   Right Lower Extremity   Hip Abduction: 4/5   Hip Adduction: 5/5   Hip Flexion: 4/5   Ankle Dorsiflexion:  5/5   Left Lower Extremity   Hip Abduction: 5/5   Hip Adduction: 5/5   Hip Flexion: 5/5   Ankle Dorsiflexion:  5/5     Vascular Exam     Right Pulses  Dorsalis Pedis:      2+  Posterior Tibial:      2+        Left Pulses  Dorsalis Pedis:      2+  Posterior Tibial:      2+        Capillary Refill  Left Hand: normal capillary refill        Edema  Right Upper Leg: absent  Left Upper Leg: absent        Imaging:  X-rays of the right hip from 04/27/2023 personally viewed by me on that day.  These include AP pelvis, AP right hip, and bilateral modified Grigsby.  Bilateral cam deformities.  There is no crossover sign, retroversion, or dysplasia.  No arthritic changes.      MRI of the right hip from 04/15/2023 personally viewed by me 04/27/2023.  This is a non arthrogram study.  There is an acetabular labral tear in the anterior and superior aspect of the hip.  There is a cam impingement.  There is no cartilage damage.      Assessment:     Ewa Welch is a 23 y.o. female with right hip labral tear, femoroacetabular impingement, as well as IT band syndrome/trochanteric bursitis  Encounter Diagnoses   Name Primary?    Tear of right acetabular labrum, initial encounter Yes    Femoroacetabular impingement of right hip             Plan:       We again discussed the diagnosis and treatment options.  I explained that surgical intervention would entail right hip arthroscopy with femoroplasty of the cam lesion and arthroscopic labral repair with capsular plication and closure.  The procedures, as well as the risks, benefits, and rehabilitation protocol were discussed at length and all her questions were answered.  After this discussion, she would like to proceed on 09/15/2023.  She does  not need preoperative clearance.  We will obtain a preoperative CT scan for preoperative planning.  Additionally, given her tobacco and nicotine use, we will use Eliquis for DVT prophylaxis.      The risks, benefits and alternatives to surgery were discussed with the patient at great length.  These include, but not limited to, bleeding, infection, vessel/nerve damage, pain, numbness, tingling, complex regional pain syndrome, hardware/surgical failure, need for further surgery, heterotopic ossification, stiffness, fracture, failure of repair, tissue retear, hypothermia, compartment syndrome, DVT, PE, arthritis and death.  Patient states an understanding and wishes to proceed with surgery.   All questions were answered.  No guarantees were implied or stated.  Informed consent was obtained.     All of their questions were answered.  They will call the clinic with any questions or concerns in the interim.    Should the patient's symptoms worsen, persist, or fail to improve they should return for reevaluation and I would be happy to see them back anytime.        Leonard Becker M.D.    Please be aware that this note has been generated with the assistance of [employer] voice-to-text.  Please excuse any spelling or grammatical errors.    Thank you for choosing Dr. Leonard Becker for your sports medicine care. It is our goal to provide you with exceptional care that will help keep you healthy, active, and get you back in the game.     If you felt that you received exemplary care today, please consider leaving feedback for Dr. Becker on [employer] at [email or web address].    Please do not hesitate to reach out to us via email, phone, or MyChart with any questions, concerns, or feedback.

## 2023-08-08 NOTE — TELEPHONE ENCOUNTER
----- Message from Vicente Parks sent at 8/8/2023  2:51 PM CDT -----  Pt has an appt for 3:00 pt is running 5 mins late please give th ept an call back if she will still be able to be seen or not   PHONE [phone]

## 2023-08-09 DIAGNOSIS — M25.851 FEMOROACETABULAR IMPINGEMENT OF RIGHT HIP: ICD-10-CM

## 2023-08-09 DIAGNOSIS — S73.191A TEAR OF RIGHT ACETABULAR LABRUM, INITIAL ENCOUNTER: Primary | ICD-10-CM

## 2023-08-16 ENCOUNTER — HOSPITAL ENCOUNTER (OUTPATIENT)
Dept: RADIOLOGY | Facility: HOSPITAL | Age: 23
Discharge: HOME | End: 2023-08-16
Attending: STUDENT IN AN ORGANIZED HEALTH CARE EDUCATION/TRAINING PROGRAM
Payer: OTHER GOVERNMENT

## 2023-08-16 DIAGNOSIS — S73.191A TEAR OF RIGHT ACETABULAR LABRUM, INITIAL ENCOUNTER: ICD-10-CM

## 2023-08-16 DIAGNOSIS — M25.851 FEMOROACETABULAR IMPINGEMENT OF RIGHT HIP: ICD-10-CM

## 2023-08-16 PROCEDURE — 73700 CT LOWER EXTREMITY W/O DYE: CPT | Mod: TC,RT

## 2023-08-16 PROCEDURE — 73700 CT LOWER EXTREMITY W/O DYE: CPT | Mod: 26,RT,, | Performed by: RADIOLOGY

## 2023-08-16 PROCEDURE — 73700 CT HIP WITHOUT CONTRAST RIGHT: ICD-10-PCS | Mod: 26,RT,, | Performed by: RADIOLOGY

## 2023-09-07 ENCOUNTER — OFFICE VISIT (OUTPATIENT)
Dept: SPORTS MEDICINE | Facility: CLINIC | Age: 23
End: 2023-09-07
Payer: OTHER GOVERNMENT

## 2023-09-07 VITALS
HEIGHT: 66 IN | WEIGHT: 138.25 LBS | HEART RATE: 60 BPM | DIASTOLIC BLOOD PRESSURE: 71 MMHG | SYSTOLIC BLOOD PRESSURE: 111 MMHG | BODY MASS INDEX: 22.22 KG/M2

## 2023-09-07 DIAGNOSIS — S73.191A TEAR OF RIGHT ACETABULAR LABRUM, INITIAL ENCOUNTER: Primary | ICD-10-CM

## 2023-09-07 DIAGNOSIS — M25.851 FEMOROACETABULAR IMPINGEMENT OF RIGHT HIP: ICD-10-CM

## 2023-09-07 PROCEDURE — 99499 NO LOS: ICD-10-PCS | Mod: S$PBB,,, | Performed by: ORTHOPAEDIC SURGERY

## 2023-09-07 PROCEDURE — 99214 OFFICE O/P EST MOD 30 MIN: CPT | Mod: PBBFAC | Performed by: ORTHOPAEDIC SURGERY

## 2023-09-07 PROCEDURE — 99999 PR PBB SHADOW E&M-EST. PATIENT-LVL IV: ICD-10-PCS | Mod: PBBFAC,,, | Performed by: ORTHOPAEDIC SURGERY

## 2023-09-07 PROCEDURE — 99499 UNLISTED E&M SERVICE: CPT | Mod: S$PBB,,, | Performed by: ORTHOPAEDIC SURGERY

## 2023-09-07 PROCEDURE — 99999 PR PBB SHADOW E&M-EST. PATIENT-LVL IV: CPT | Mod: PBBFAC,,, | Performed by: ORTHOPAEDIC SURGERY

## 2023-09-07 RX ORDER — CELECOXIB 200 MG/1
200 CAPSULE ORAL 2 TIMES DAILY WITH MEALS
Qty: 60 CAPSULE | Refills: 0 | Status: SHIPPED | OUTPATIENT
Start: 2023-09-07

## 2023-09-07 RX ORDER — NAPROXEN 500 MG/1
500 TABLET ORAL 2 TIMES DAILY
Qty: 42 TABLET | Refills: 0 | Status: SHIPPED | OUTPATIENT
Start: 2023-09-07 | End: 2023-10-06

## 2023-09-07 RX ORDER — METHOCARBAMOL 500 MG/1
500 TABLET, FILM COATED ORAL 3 TIMES DAILY PRN
Qty: 30 TABLET | Refills: 0 | Status: SHIPPED | OUTPATIENT
Start: 2023-09-07

## 2023-09-07 RX ORDER — SODIUM CHLORIDE 9 MG/ML
INJECTION, SOLUTION INTRAVENOUS CONTINUOUS
Status: CANCELLED | OUTPATIENT
Start: 2023-09-07

## 2023-09-07 RX ORDER — INDOMETHACIN 75 MG/1
75 CAPSULE, EXTENDED RELEASE ORAL 2 TIMES DAILY
Qty: 8 CAPSULE | Refills: 0 | Status: SHIPPED | OUTPATIENT
Start: 2023-09-07 | End: 2023-09-19

## 2023-09-07 RX ORDER — CEFAZOLIN SODIUM 2 G/50ML
2 SOLUTION INTRAVENOUS
Status: CANCELLED | OUTPATIENT
Start: 2023-09-07

## 2023-09-07 RX ORDER — OXYCODONE HYDROCHLORIDE 5 MG/1
5 TABLET ORAL EVERY 6 HOURS PRN
Qty: 28 TABLET | Refills: 0 | Status: SHIPPED | OUTPATIENT
Start: 2023-09-07

## 2023-09-07 RX ORDER — IBUPROFEN 200 MG
200 TABLET ORAL EVERY 6 HOURS PRN
COMMUNITY

## 2023-09-07 RX ORDER — PROMETHAZINE HYDROCHLORIDE 25 MG/1
25 TABLET ORAL EVERY 6 HOURS PRN
Qty: 30 TABLET | Refills: 0 | Status: SHIPPED | OUTPATIENT
Start: 2023-09-07

## 2023-09-07 NOTE — H&P (VIEW-ONLY)
Ewa Welch  is here for a completion of her perioperative paperwork. she  Is scheduled to undergo:    Right hip  Arthroscopy   Acetabular labral repair   Possible osteoplasty of femur   Possible osteoplasty of acetabulum   Capsular plication   All other indicated procedures     on 09/15/2023.  She is a healthy individual and does not need clearance for this procedure.     Risks, indications and benefits of the surgical procedure were discussed with the patient. All questions with regard to surgery, rehab, expected return to functional activities, activities of daily living and recreational endeavors were answered to her satisfaction.    Discussed COVID-19 with the patient, they are aware of our current policies and procedures, were given the option of delaying surgery, and they elect to proceed.    Patient was informed and understands the risks of surgery are greater for patients with a current condition or history of heart disease, obesity, clotting disorders, recurrent infections, steroid use, current or past smoking, and factors such as sedentary lifestyle and noncompliance with medications, therapy or follow-up. The degree of the increased risk is hard to estimate with any degree of precision.    Once no other questions were asked, a brief history and physical exam was then performed.    PAST MEDICAL HISTORY:   Past Medical History:   Diagnosis Date    Generalized anxiety disorder 03/29/2016    Infected pilonidal cyst 4/5/2017    Seasonal allergic rhinitis due to pollen 4/4/2013    Self-harming behavior     Tonsillar hypertrophy 2/12/2018     PAST SURGICAL HISTORY:   Past Surgical History:   Procedure Laterality Date    ADENOIDECTOMY  02/12/2018    Dr. Bernard    OTHER SURGICAL HISTORY Left     toenail removal big toe    TONSILLECTOMY  02/12/2018    Dr. Bernard    WISDOM TOOTH EXTRACTION Bilateral      FAMILY HISTORY:   Family History   Problem Relation Age of Onset    Allergies Mother     Eczema Sister      Cancer Paternal Grandmother     Cirrhosis Paternal Grandmother     No Known Problems Father      SOCIAL HISTORY:   Social History     Socioeconomic History    Marital status:    Tobacco Use    Smoking status: Never    Smokeless tobacco: Never   Substance and Sexual Activity    Alcohol use: Yes     Comment: Occasional    Drug use: No    Sexual activity: Yes     Partners: Female, Male   Social History Narrative    Lives with mom, dad and sister Erika.    She is planning to go to the [employer] next year.    Sexually relationship with female.               MEDICATIONS:   Current Outpatient Medications:     ibuprofen (ADVIL,MOTRIN) 200 MG tablet, Take 200 mg by mouth every 6 (six) hours as needed for Pain., Disp: , Rfl:     fluticasone (FLONASE) 50 mcg/actuation nasal spray, 1 spray (50 mcg total) by Each Nare route once daily. (Patient not taking: Reported on 6/8/2023), Disp: 16 g, Rfl: 11    loratadine (CLARITIN) 10 mg tablet, Take 1 tablet (10 mg total) by mouth once daily. (Patient taking differently: Take 10 mg by mouth daily as needed. ), Disp: 30 tablet, Rfl: 3  ALLERGIES:   Review of patient's allergies indicates:   Allergen Reactions    Latex, natural rubber Rash       Review of Systems   Constitution: Negative. Negative for chills, fever and night sweats.   HENT: Negative for congestion and headaches.    Eyes: Negative for blurred vision, left vision loss and right vision loss.   Cardiovascular: Negative for chest pain and syncope.   Respiratory: Negative for cough and shortness of breath.    Endocrine: Negative for polydipsia, polyphagia and polyuria.   Hematologic/Lymphatic: Negative for bleeding problem. Does not bruise/bleed easily.   Skin: Negative for dry skin, itching and rash.   Musculoskeletal: Negative for falls and muscle weakness.   Gastrointestinal: Negative for abdominal pain and bowel incontinence.   Genitourinary: Negative for bladder incontinence and nocturia.   Neurological: Negative for  disturbances in coordination, loss of balance and seizures.   Psychiatric/Behavioral: Negative for depression. The patient does not have insomnia.    Allergic/Immunologic: Negative for hives and persistent infections.     PHYSICAL EXAM:  GEN: A&Ox3, WD WN NAD  HEENT: WNL  CHEST: CTAB, no W/R/R  HEART: RRR, no M/R/G   ABD: Soft, NT ND, BS x4 QUADS  MS: Refer to previous note for detailed MS exam  NEURO: CN II-XII intact       The surgical consent was then reviewed with the patient, who agreed with all the contents of the consent form and it was signed. she was instructed to wait for a phone call from the anesthesia department prior to surgery to discuss past medical history, medications, and clearance. Also, informed she may be required to get additional testing per the anesthesia department prior to having surgery.     PHYSICAL THERAPY:  She was also instructed regarding physical therapy and will begin POD # 1-3. She is doing physical therapy at [facility].    POST OP CARE:instructions were reviewed including care of the wound and dressing after surgery and when she can shower.     PAIN MANAGEMENT: Ewa Welch was also given a pain management regime, which includes the TENS unit.  Patient declined receiving a new unit today as she already has one. She was also instructed regarding the Polar ice unit that will be in place after surgery and her postoperative pain medications.     PAIN MEDICATION:  Roxicodone (Oxycodone) 5 mg po q 4-6 hours prn pain   Phenergan 25 mg one p.o. q.4-6 hours prn nausea and vomiting.  Celebrex 200 mg BID with meals  Indocin 75 mg b.i.d. x 4 days  Naproxen 500 mg b.i.d. x21 days  Robaxin 500mg q 8 hours prn pain  Eliquis 2.5 mg b.i.d. x 4 weeks for DVT prophylaxis starting on the evening after surgery.    Post op meds to be delivered bedside prior to discharge. Deliver to family if patient is in surgery at 5pm.   Patient denies history of seizures.     Patient  will also use bilateral TEDs on lower extremities, SCDs during surgery, and early ambulation post-op. If the patient was previously taking 81mg baby aspirin, they were told to not take it will using the above stated aspirin and to restart the 81mg aspirin after completion of the aspirin dose.       Patient was also told to buy over the counter Prilosec medication and take it once daily for GI protection as long as they are taking NSAIDs or Aspirin.    DVT prophylaxis was discussed with the patient today including risk factors for developing DVTs and history of DVTs. The patient was asked if any specific recommendations were given from the doctor/s that did pre-operative surgical clearance.      Patient was asked if they were taking or using OCP pills or devices. If they answered yes, then they were instructed to stop using OCPs at this pre-operative appointment until 2 months post-op to help prevent DVT development. They understand that there are other forms of birth control that do not involve hormones. They expressed understanding that ignoring/not following this instruction could result in a DVT which could turn into a deadly pulmonary embolism.      The patient was told that narcotic pain medications may make them drowsy and instructions were given to not sign legal documents, drive or operate heavy machinery, cars, or equipment while under the influence of narcotic medications.     My supervising physician Leonard Becker MD was also present during the appointment.  He discussed with the patient all the potential complications, risks, and benefits of their upcoming surgery.    As there were no other questions to be asked, she was given my business card along with Leonard Becker'MD sophia business card if she has any questions or concerns prior to surgery or in the postop period.

## 2023-09-07 NOTE — H&P
Ewa Welch  is here for a completion of her perioperative paperwork. she  Is scheduled to undergo:    Right hip  Arthroscopy   Acetabular labral repair   Possible osteoplasty of femur   Possible osteoplasty of acetabulum   Capsular plication   All other indicated procedures     on 09/15/2023.  She is a healthy individual and does not need clearance for this procedure.     Risks, indications and benefits of the surgical procedure were discussed with the patient. All questions with regard to surgery, rehab, expected return to functional activities, activities of daily living and recreational endeavors were answered to her satisfaction.    Discussed COVID-19 with the patient, they are aware of our current policies and procedures, were given the option of delaying surgery, and they elect to proceed.    Patient was informed and understands the risks of surgery are greater for patients with a current condition or history of heart disease, obesity, clotting disorders, recurrent infections, steroid use, current or past smoking, and factors such as sedentary lifestyle and noncompliance with medications, therapy or follow-up. The degree of the increased risk is hard to estimate with any degree of precision.    Once no other questions were asked, a brief history and physical exam was then performed.    PAST MEDICAL HISTORY:   Past Medical History:   Diagnosis Date    Generalized anxiety disorder 03/29/2016    Infected pilonidal cyst 4/5/2017    Seasonal allergic rhinitis due to pollen 4/4/2013    Self-harming behavior     Tonsillar hypertrophy 2/12/2018     PAST SURGICAL HISTORY:   Past Surgical History:   Procedure Laterality Date    ADENOIDECTOMY  02/12/2018    Dr. Bernard    OTHER SURGICAL HISTORY Left     toenail removal big toe    TONSILLECTOMY  02/12/2018    Dr. Bernard    WISDOM TOOTH EXTRACTION Bilateral      FAMILY HISTORY:   Family History   Problem Relation Age of Onset    Allergies Mother     Eczema Sister      Cancer Paternal Grandmother     Cirrhosis Paternal Grandmother     No Known Problems Father      SOCIAL HISTORY:   Social History     Socioeconomic History    Marital status:    Tobacco Use    Smoking status: Never    Smokeless tobacco: Never   Substance and Sexual Activity    Alcohol use: Yes     Comment: Occasional    Drug use: No    Sexual activity: Yes     Partners: Female, Male   Social History Narrative    Lives with mom, dad and sister Erika.    She is planning to go to the [place] next year.    Sexually relationship with female.               MEDICATIONS:   Current Outpatient Medications:     ibuprofen (ADVIL,MOTRIN) 200 MG tablet, Take 200 mg by mouth every 6 (six) hours as needed for Pain., Disp: , Rfl:     fluticasone (FLONASE) 50 mcg/actuation nasal spray, 1 spray (50 mcg total) by Each Nare route once daily. (Patient not taking: Reported on 6/8/2023), Disp: 16 g, Rfl: 11    loratadine (CLARITIN) 10 mg tablet, Take 1 tablet (10 mg total) by mouth once daily. (Patient taking differently: Take 10 mg by mouth daily as needed. ), Disp: 30 tablet, Rfl: 3  ALLERGIES:   Review of patient's allergies indicates:   Allergen Reactions    Latex, natural rubber Rash       Review of Systems   Constitution: Negative. Negative for chills, fever and night sweats.   HENT: Negative for congestion and headaches.    Eyes: Negative for blurred vision, left vision loss and right vision loss.   Cardiovascular: Negative for chest pain and syncope.   Respiratory: Negative for cough and shortness of breath.    Endocrine: Negative for polydipsia, polyphagia and polyuria.   Hematologic/Lymphatic: Negative for bleeding problem. Does not bruise/bleed easily.   Skin: Negative for dry skin, itching and rash.   Musculoskeletal: Negative for falls and muscle weakness.   Gastrointestinal: Negative for abdominal pain and bowel incontinence.   Genitourinary: Negative for bladder incontinence and nocturia.   Neurological: Negative for  disturbances in coordination, loss of balance and seizures.   Psychiatric/Behavioral: Negative for depression. The patient does not have insomnia.    Allergic/Immunologic: Negative for hives and persistent infections.     PHYSICAL EXAM:  GEN: A&Ox3, WD WN NAD  HEENT: WNL  CHEST: CTAB, no W/R/R  HEART: RRR, no M/R/G   ABD: Soft, NT ND, BS x4 QUADS  MS: Refer to previous note for detailed MS exam  NEURO: CN II-XII intact       The surgical consent was then reviewed with the patient, who agreed with all the contents of the consent form and it was signed. she was instructed to wait for a phone call from the anesthesia department prior to surgery to discuss past medical history, medications, and clearance. Also, informed she may be required to get additional testing per the anesthesia department prior to having surgery.     PHYSICAL THERAPY:  She was also instructed regarding physical therapy and will begin POD # 1-3. She is doing physical therapy at [facility].    POST OP CARE:instructions were reviewed including care of the wound and dressing after surgery and when she can shower.     PAIN MANAGEMENT: Ewa Welch was also given a pain management regime, which includes the TENS unit.  Patient declined receiving a new unit today as she already has one. She was also instructed regarding the Polar ice unit that will be in place after surgery and her postoperative pain medications.     PAIN MEDICATION:  Roxicodone (Oxycodone) 5 mg po q 4-6 hours prn pain   Phenergan 25 mg one p.o. q.4-6 hours prn nausea and vomiting.  Celebrex 200 mg BID with meals  Indocin 75 mg b.i.d. x 4 days  Naproxen 500 mg b.i.d. x21 days  Robaxin 500mg q 8 hours prn pain  Eliquis 2.5 mg b.i.d. x 4 weeks for DVT prophylaxis starting on the evening after surgery.    Post op meds to be delivered bedside prior to discharge. Deliver to family if patient is in surgery at 5pm.   Patient denies history of seizures.     Patient  will also use bilateral TEDs on lower extremities, SCDs during surgery, and early ambulation post-op. If the patient was previously taking 81mg baby aspirin, they were told to not take it will using the above stated aspirin and to restart the 81mg aspirin after completion of the aspirin dose.       Patient was also told to buy over the counter Prilosec medication and take it once daily for GI protection as long as they are taking NSAIDs or Aspirin.    DVT prophylaxis was discussed with the patient today including risk factors for developing DVTs and history of DVTs. The patient was asked if any specific recommendations were given from the doctor/s that did pre-operative surgical clearance.      Patient was asked if they were taking or using OCP pills or devices. If they answered yes, then they were instructed to stop using OCPs at this pre-operative appointment until 2 months post-op to help prevent DVT development. They understand that there are other forms of birth control that do not involve hormones. They expressed understanding that ignoring/not following this instruction could result in a DVT which could turn into a deadly pulmonary embolism.      The patient was told that narcotic pain medications may make them drowsy and instructions were given to not sign legal documents, drive or operate heavy machinery, cars, or equipment while under the influence of narcotic medications.     My supervising physician Leonard Becker MD was also present during the appointment.  He discussed with the patient all the potential complications, risks, and benefits of their upcoming surgery.    As there were no other questions to be asked, she was given my business card along with Leonard Becker'MD sophia business card if she has any questions or concerns prior to surgery or in the postop period.

## 2023-09-07 NOTE — LETTER
Patient: Ewa Welch   YOB: 2000   Clinic Number: [id number]   Today's Date: September 7, 2023        Certificate to Return to Work     Ewa Marquez was seen by Anthony Hogan PA-C on 9/7/2023.    No follow-ups on file. Ewa Marquez is scheduled for surgery on 9/15/23. She will require home assistance following surgery until 9/20/23. Please excuse her dependant, Lazara Thomason, from any work duties until this time.    If you have any questions or concerns, please feel free to contact the office at [phone].    Thank you.    Anthony Hogan PA-C        Signature: _ _________________________________________________

## 2023-09-13 ENCOUNTER — PATIENT MESSAGE (OUTPATIENT)
Dept: PREADMISSION TESTING | Facility: HOSPITAL | Age: 23
End: 2023-09-13
Payer: OTHER GOVERNMENT

## 2023-09-13 NOTE — ANESTHESIA PAT ROS NOTE
09/13/2023  Ewa Welch is a 23 y.o., female.      Pre-op Assessment    I have reviewed the Patient Summary Reports.       I have reviewed the Medications.     Review of Systems  Anesthesia Hx:  No problems with previous Anesthesia   History of prior surgery of interest to airway management or planning:  Previous anesthesia: General 2/12/2018  T&A with general anesthesia.  Procedure performed at an [facility].      Airway issues documented on chart review include mask, easy, GETA, easy direct laryngoscopy , view on direct laryngoscopy Grade I      Denies Personal Hx of Anesthesia complications.                    Social:  Non-Smoker, Social Alcohol Use       Hematology/Oncology:  Hematology Normal   Oncology Normal                                   EENT/Dental:  chronic allergic rhinitis H/O Tonsillar Hypertrophy, S/P T&A, [place] tooth extraction          Cardiovascular:  Cardiovascular Normal Exercise tolerance: good       Denies CAD.       Denies Angina.       Denies NEWTON.                            Pulmonary:  Pulmonary Normal    Denies Asthma.   Denies Shortness of breath.                  Renal/:  Renal/ Normal  Denies Chronic Renal Disease.                Hepatic/GI:  Hepatic/GI Normal     Denies GERD. Denies Liver Disease.            Musculoskeletal:     Tear of right acetabular labrum,  Femoroacetabular impingement of right hip,  H/O Toenail removal left big toe              OB/GYN/PEDS:  H/O Pilonidal cyst           Neurological:  Neurology Normal      Denies Headaches. Denies Seizures.                                Endocrine:  Endocrine Normal Denies Diabetes. Denies Hypothyroidism.          Psych:   anxiety  H/O self-harming behavior             Past Medical History:   Diagnosis Date    Generalized anxiety disorder 03/29/2016    Infected pilonidal cyst 4/5/2017    Seasonal allergic  rhinitis due to pollen 4/4/2013    Self-harming behavior     Tonsillar hypertrophy 2/12/2018     Past Surgical History:   Procedure Laterality Date    ADENOIDECTOMY  02/12/2018    Dr. Bernard    OTHER SURGICAL HISTORY Left     toenail removal big toe    TONSILLECTOMY  02/12/2018    Dr. Bernard    WISDOM TOOTH EXTRACTION Bilateral         Anesthesia Assessment: Preoperative EQUATION    Planned Procedure: Procedure(s) (LRB):  ARTHROSCOPY, HIP, WITH ACETABULOPLASTY, WITH REPAIR OF LABRUM IF INDICATED (Right)  ARTHROSCOPY, HIP, WITH FEMOROPLASTY (Right)  Requested Anesthesia Type:General  Surgeon: Leonard Becker MD  Service: Orthopedics  Known or anticipated Date of Surgery:9/15/2023    Surgeon notes: reviewed    Electronic QUestionnaire Assessment completed via nurse interview with patient.        Triage considerations:     The patient has no apparent active cardiac condition (No unstable coronary Syndrome such as severe unstable angina or recent [<1 month] myocardial infarction, decompensated CHF, severe valvular   disease or significant arrhythmia)    Previous anesthesia records:GETA, Easy airway, Easy intubation, and No problems    Last [facility]       Other important co-morbidities:  no co-morbidities noted                 Instructions given. (See in Nurse's note)      Ht: 5'6  Wt: 138 lb  BMI: 22.31

## 2023-09-14 ENCOUNTER — PATIENT MESSAGE (OUTPATIENT)
Dept: SPORTS MEDICINE | Facility: CLINIC | Age: 23
End: 2023-09-14
Payer: OTHER GOVERNMENT

## 2023-09-14 ENCOUNTER — ANESTHESIA EVENT (OUTPATIENT)
Dept: SURGERY | Facility: HOSPITAL | Age: 23
End: 2023-09-14
Payer: OTHER GOVERNMENT

## 2023-09-15 ENCOUNTER — HOSPITAL ENCOUNTER (OUTPATIENT)
Facility: HOSPITAL | Age: 23
Discharge: HOME | End: 2023-09-15
Attending: STUDENT IN AN ORGANIZED HEALTH CARE EDUCATION/TRAINING PROGRAM | Admitting: STUDENT IN AN ORGANIZED HEALTH CARE EDUCATION/TRAINING PROGRAM
Payer: OTHER GOVERNMENT

## 2023-09-15 ENCOUNTER — ANESTHESIA (OUTPATIENT)
Dept: SURGERY | Facility: HOSPITAL | Age: 23
End: 2023-09-15
Payer: OTHER GOVERNMENT

## 2023-09-15 VITALS
DIASTOLIC BLOOD PRESSURE: 62 MMHG | HEIGHT: 66 IN | WEIGHT: 132 LBS | TEMPERATURE: 98 F | BODY MASS INDEX: 21.21 KG/M2 | RESPIRATION RATE: 12 BRPM | OXYGEN SATURATION: 100 % | SYSTOLIC BLOOD PRESSURE: 117 MMHG | HEART RATE: 87 BPM

## 2023-09-15 DIAGNOSIS — M25.851 FEMOROACETABULAR IMPINGEMENT OF RIGHT HIP: ICD-10-CM

## 2023-09-15 DIAGNOSIS — S73.191A TEAR OF RIGHT ACETABULAR LABRUM, INITIAL ENCOUNTER: ICD-10-CM

## 2023-09-15 LAB
B-HCG UR QL: NEGATIVE
CTP QC/QA: YES

## 2023-09-15 PROCEDURE — 36000710: Performed by: STUDENT IN AN ORGANIZED HEALTH CARE EDUCATION/TRAINING PROGRAM

## 2023-09-15 PROCEDURE — 29914 HIP ARTHRO W/FEMOROPLASTY: CPT | Mod: 51,RT,, | Performed by: STUDENT IN AN ORGANIZED HEALTH CARE EDUCATION/TRAINING PROGRAM

## 2023-09-15 PROCEDURE — 01202 ANES ARTHROSCOPIC PX HIP JT: CPT | Performed by: STUDENT IN AN ORGANIZED HEALTH CARE EDUCATION/TRAINING PROGRAM

## 2023-09-15 PROCEDURE — 76942 PENG SS: ICD-10-PCS | Mod: 26,,, | Performed by: ANESTHESIOLOGY

## 2023-09-15 PROCEDURE — 29914 HIP ARTHRO W/FEMOROPLASTY: CPT | Mod: AS,51,RT, | Performed by: ORTHOPAEDIC SURGERY

## 2023-09-15 PROCEDURE — 99900035 HC TECH TIME PER 15 MIN (STAT)

## 2023-09-15 PROCEDURE — 71000033 HC RECOVERY, INTIAL HOUR: Performed by: STUDENT IN AN ORGANIZED HEALTH CARE EDUCATION/TRAINING PROGRAM

## 2023-09-15 PROCEDURE — 29916 PR ARTHROSCOPY HIP W/LABRAL REPAIR: ICD-10-PCS | Mod: AS,RT,, | Performed by: ORTHOPAEDIC SURGERY

## 2023-09-15 PROCEDURE — C1713 ANCHOR/SCREW BN/BN,TIS/BN: HCPCS | Performed by: STUDENT IN AN ORGANIZED HEALTH CARE EDUCATION/TRAINING PROGRAM

## 2023-09-15 PROCEDURE — D9220A PRA ANESTHESIA: ICD-10-PCS | Mod: ANES,,, | Performed by: ANESTHESIOLOGY

## 2023-09-15 PROCEDURE — 25000003 PHARM REV CODE 250: Performed by: NURSE ANESTHETIST, CERTIFIED REGISTERED

## 2023-09-15 PROCEDURE — 64450 NJX AA&/STRD OTHER PN/BRANCH: CPT | Mod: 59,RT,, | Performed by: ANESTHESIOLOGY

## 2023-09-15 PROCEDURE — 37000009 HC ANESTHESIA EA ADD 15 MINS: Performed by: STUDENT IN AN ORGANIZED HEALTH CARE EDUCATION/TRAINING PROGRAM

## 2023-09-15 PROCEDURE — 63600175 PHARM REV CODE 636 W HCPCS: Performed by: STUDENT IN AN ORGANIZED HEALTH CARE EDUCATION/TRAINING PROGRAM

## 2023-09-15 PROCEDURE — 29916 HIP ARTHRO W/LABRAL REPAIR: CPT | Mod: AS,RT,, | Performed by: ORTHOPAEDIC SURGERY

## 2023-09-15 PROCEDURE — 29914 PR ARTHROSCOPY HIP W/FEMOROPLASTY: ICD-10-PCS | Mod: 51,RT,, | Performed by: STUDENT IN AN ORGANIZED HEALTH CARE EDUCATION/TRAINING PROGRAM

## 2023-09-15 PROCEDURE — 25000003 PHARM REV CODE 250: Performed by: ANESTHESIOLOGY

## 2023-09-15 PROCEDURE — 63600175 PHARM REV CODE 636 W HCPCS: Performed by: NURSE ANESTHETIST, CERTIFIED REGISTERED

## 2023-09-15 PROCEDURE — 94761 N-INVAS EAR/PLS OXIMETRY MLT: CPT

## 2023-09-15 PROCEDURE — 63600175 PHARM REV CODE 636 W HCPCS: Performed by: ANESTHESIOLOGY

## 2023-09-15 PROCEDURE — 76942 ECHO GUIDE FOR BIOPSY: CPT | Mod: 26,,, | Performed by: ANESTHESIOLOGY

## 2023-09-15 PROCEDURE — 25000003 PHARM REV CODE 250: Performed by: ORTHOPAEDIC SURGERY

## 2023-09-15 PROCEDURE — 27201423 OPTIME MED/SURG SUP & DEVICES STERILE SUPPLY: Performed by: STUDENT IN AN ORGANIZED HEALTH CARE EDUCATION/TRAINING PROGRAM

## 2023-09-15 PROCEDURE — 37000008 HC ANESTHESIA 1ST 15 MINUTES: Performed by: STUDENT IN AN ORGANIZED HEALTH CARE EDUCATION/TRAINING PROGRAM

## 2023-09-15 PROCEDURE — 29916 PR ARTHROSCOPY HIP W/LABRAL REPAIR: ICD-10-PCS | Mod: RT,,, | Performed by: STUDENT IN AN ORGANIZED HEALTH CARE EDUCATION/TRAINING PROGRAM

## 2023-09-15 PROCEDURE — 64450 PENG SS: ICD-10-PCS | Mod: 59,RT,, | Performed by: ANESTHESIOLOGY

## 2023-09-15 PROCEDURE — 29916 HIP ARTHRO W/LABRAL REPAIR: CPT | Mod: RT,,, | Performed by: STUDENT IN AN ORGANIZED HEALTH CARE EDUCATION/TRAINING PROGRAM

## 2023-09-15 PROCEDURE — D9220A PRA ANESTHESIA: Mod: ANES,,, | Performed by: ANESTHESIOLOGY

## 2023-09-15 PROCEDURE — 29914 PR ARTHROSCOPY HIP W/FEMOROPLASTY: ICD-10-PCS | Mod: AS,51,RT, | Performed by: ORTHOPAEDIC SURGERY

## 2023-09-15 PROCEDURE — 71000015 HC POSTOP RECOV 1ST HR: Performed by: STUDENT IN AN ORGANIZED HEALTH CARE EDUCATION/TRAINING PROGRAM

## 2023-09-15 PROCEDURE — 36000711: Performed by: STUDENT IN AN ORGANIZED HEALTH CARE EDUCATION/TRAINING PROGRAM

## 2023-09-15 PROCEDURE — 81025 URINE PREGNANCY TEST: CPT | Performed by: ORTHOPAEDIC SURGERY

## 2023-09-15 PROCEDURE — 25000003 PHARM REV CODE 250: Performed by: STUDENT IN AN ORGANIZED HEALTH CARE EDUCATION/TRAINING PROGRAM

## 2023-09-15 PROCEDURE — D9220A PRA ANESTHESIA: Mod: CRNA,,, | Performed by: NURSE ANESTHETIST, CERTIFIED REGISTERED

## 2023-09-15 PROCEDURE — 76942 ECHO GUIDE FOR BIOPSY: CPT | Performed by: STUDENT IN AN ORGANIZED HEALTH CARE EDUCATION/TRAINING PROGRAM

## 2023-09-15 PROCEDURE — D9220A PRA ANESTHESIA: ICD-10-PCS | Mod: CRNA,,, | Performed by: NURSE ANESTHETIST, CERTIFIED REGISTERED

## 2023-09-15 DEVICE — IMPLANTABLE DEVICE: Type: IMPLANTABLE DEVICE | Site: HIP | Status: FUNCTIONAL

## 2023-09-15 DEVICE — ANCHOR SUT FIBERTAK 1.8 KNTLS: Type: IMPLANTABLE DEVICE | Site: HIP | Status: FUNCTIONAL

## 2023-09-15 RX ORDER — ONDANSETRON 2 MG/ML
INJECTION INTRAMUSCULAR; INTRAVENOUS
Status: DISCONTINUED | OUTPATIENT
Start: 2023-09-15 | End: 2023-09-15

## 2023-09-15 RX ORDER — FENTANYL CITRATE 50 UG/ML
25 INJECTION, SOLUTION INTRAMUSCULAR; INTRAVENOUS EVERY 5 MIN PRN
Status: DISCONTINUED | OUTPATIENT
Start: 2023-09-15 | End: 2023-09-15 | Stop reason: HOSPADM

## 2023-09-15 RX ORDER — OXYCODONE HYDROCHLORIDE 5 MG/1
5 TABLET ORAL
Status: DISCONTINUED | OUTPATIENT
Start: 2023-09-15 | End: 2023-09-15 | Stop reason: HOSPADM

## 2023-09-15 RX ORDER — DEXAMETHASONE SODIUM PHOSPHATE 4 MG/ML
INJECTION, SOLUTION INTRA-ARTICULAR; INTRALESIONAL; INTRAMUSCULAR; INTRAVENOUS; SOFT TISSUE
Status: DISCONTINUED | OUTPATIENT
Start: 2023-09-15 | End: 2023-09-15

## 2023-09-15 RX ORDER — ONDANSETRON 2 MG/ML
4 INJECTION INTRAMUSCULAR; INTRAVENOUS EVERY 12 HOURS PRN
Status: DISCONTINUED | OUTPATIENT
Start: 2023-09-15 | End: 2023-09-15 | Stop reason: HOSPADM

## 2023-09-15 RX ORDER — KETAMINE HCL IN 0.9 % NACL 50 MG/5 ML
SYRINGE (ML) INTRAVENOUS
Status: DISCONTINUED | OUTPATIENT
Start: 2023-09-15 | End: 2023-09-15

## 2023-09-15 RX ORDER — ACETAMINOPHEN 500 MG
1000 TABLET ORAL ONCE
Status: COMPLETED | OUTPATIENT
Start: 2023-09-15 | End: 2023-09-15

## 2023-09-15 RX ORDER — HALOPERIDOL 5 MG/ML
0.5 INJECTION INTRAMUSCULAR EVERY 10 MIN PRN
Status: DISCONTINUED | OUTPATIENT
Start: 2023-09-15 | End: 2023-09-15 | Stop reason: HOSPADM

## 2023-09-15 RX ORDER — BUPIVACAINE HYDROCHLORIDE 2.5 MG/ML
INJECTION, SOLUTION EPIDURAL; INFILTRATION; INTRACAUDAL
Status: COMPLETED | OUTPATIENT
Start: 2023-09-15 | End: 2023-09-15

## 2023-09-15 RX ORDER — METOCLOPRAMIDE HYDROCHLORIDE 5 MG/ML
5 INJECTION INTRAMUSCULAR; INTRAVENOUS EVERY 6 HOURS PRN
Status: DISCONTINUED | OUTPATIENT
Start: 2023-09-15 | End: 2023-09-15 | Stop reason: HOSPADM

## 2023-09-15 RX ORDER — BUPIVACAINE HYDROCHLORIDE AND EPINEPHRINE 2.5; 5 MG/ML; UG/ML
INJECTION, SOLUTION EPIDURAL; INFILTRATION; INTRACAUDAL; PERINEURAL
Status: DISCONTINUED | OUTPATIENT
Start: 2023-09-15 | End: 2023-09-15

## 2023-09-15 RX ORDER — PHENYLEPHRINE HYDROCHLORIDE 10 MG/ML
INJECTION INTRAVENOUS
Status: DISCONTINUED | OUTPATIENT
Start: 2023-09-15 | End: 2023-09-15

## 2023-09-15 RX ORDER — LIDOCAINE HYDROCHLORIDE 20 MG/ML
INJECTION INTRAVENOUS
Status: DISCONTINUED | OUTPATIENT
Start: 2023-09-15 | End: 2023-09-15

## 2023-09-15 RX ORDER — MIDAZOLAM HYDROCHLORIDE 1 MG/ML
.5-4 INJECTION INTRAMUSCULAR; INTRAVENOUS
Status: DISCONTINUED | OUTPATIENT
Start: 2023-09-15 | End: 2023-09-15 | Stop reason: HOSPADM

## 2023-09-15 RX ORDER — FENTANYL CITRATE 50 UG/ML
INJECTION, SOLUTION INTRAMUSCULAR; INTRAVENOUS
Status: DISCONTINUED | OUTPATIENT
Start: 2023-09-15 | End: 2023-09-15

## 2023-09-15 RX ORDER — HYDROCODONE BITARTRATE AND ACETAMINOPHEN 5; 325 MG/1; MG/1
1 TABLET ORAL EVERY 4 HOURS PRN
Status: DISCONTINUED | OUTPATIENT
Start: 2023-09-15 | End: 2023-09-15 | Stop reason: HOSPADM

## 2023-09-15 RX ORDER — FENTANYL CITRATE 50 UG/ML
25-200 INJECTION, SOLUTION INTRAMUSCULAR; INTRAVENOUS
Status: DISCONTINUED | OUTPATIENT
Start: 2023-09-15 | End: 2023-09-15 | Stop reason: HOSPADM

## 2023-09-15 RX ORDER — CELECOXIB 200 MG/1
400 CAPSULE ORAL ONCE
Status: COMPLETED | OUTPATIENT
Start: 2023-09-15 | End: 2023-09-15

## 2023-09-15 RX ORDER — EPINEPHRINE CONVENIENCE KIT 1 MG/ML(1)
KIT INTRAMUSCULAR; SUBCUTANEOUS
Status: DISCONTINUED | OUTPATIENT
Start: 2023-09-15 | End: 2023-09-15 | Stop reason: HOSPADM

## 2023-09-15 RX ORDER — ROCURONIUM BROMIDE 10 MG/ML
INJECTION, SOLUTION INTRAVENOUS
Status: DISCONTINUED | OUTPATIENT
Start: 2023-09-15 | End: 2023-09-15

## 2023-09-15 RX ORDER — OXYCODONE HYDROCHLORIDE 5 MG/1
10 TABLET ORAL EVERY 4 HOURS PRN
Status: DISCONTINUED | OUTPATIENT
Start: 2023-09-15 | End: 2023-09-15 | Stop reason: HOSPADM

## 2023-09-15 RX ORDER — MIDAZOLAM HYDROCHLORIDE 1 MG/ML
INJECTION INTRAMUSCULAR; INTRAVENOUS
Status: DISCONTINUED | OUTPATIENT
Start: 2023-09-15 | End: 2023-09-15

## 2023-09-15 RX ORDER — PROPOFOL 10 MG/ML
VIAL (ML) INTRAVENOUS
Status: DISCONTINUED | OUTPATIENT
Start: 2023-09-15 | End: 2023-09-15

## 2023-09-15 RX ORDER — METHOCARBAMOL 750 MG/1
750 TABLET, FILM COATED ORAL ONCE
Status: COMPLETED | OUTPATIENT
Start: 2023-09-15 | End: 2023-09-15

## 2023-09-15 RX ORDER — ACETAMINOPHEN 325 MG/1
650 TABLET ORAL EVERY 4 HOURS PRN
Status: DISCONTINUED | OUTPATIENT
Start: 2023-09-15 | End: 2023-09-15 | Stop reason: HOSPADM

## 2023-09-15 RX ORDER — SODIUM CHLORIDE 0.9 % (FLUSH) 0.9 %
3 SYRINGE (ML) INJECTION
Status: DISCONTINUED | OUTPATIENT
Start: 2023-09-15 | End: 2023-09-15 | Stop reason: HOSPADM

## 2023-09-15 RX ORDER — SODIUM CHLORIDE 9 MG/ML
INJECTION, SOLUTION INTRAVENOUS CONTINUOUS
Status: DISCONTINUED | OUTPATIENT
Start: 2023-09-15 | End: 2023-09-15 | Stop reason: HOSPADM

## 2023-09-15 RX ORDER — DEXAMETHASONE SODIUM PHOSPHATE 10 MG/ML
INJECTION INTRAMUSCULAR; INTRAVENOUS
Status: DISCONTINUED | OUTPATIENT
Start: 2023-09-15 | End: 2023-09-15

## 2023-09-15 RX ORDER — EPHEDRINE SULFATE 50 MG/ML
INJECTION, SOLUTION INTRAVENOUS
Status: DISCONTINUED | OUTPATIENT
Start: 2023-09-15 | End: 2023-09-15

## 2023-09-15 RX ADMIN — LIDOCAINE HYDROCHLORIDE 60 MG: 20 INJECTION INTRAVENOUS at 07:09

## 2023-09-15 RX ADMIN — OXYCODONE HYDROCHLORIDE 5 MG: 5 TABLET ORAL at 11:09

## 2023-09-15 RX ADMIN — CELECOXIB 400 MG: 200 CAPSULE ORAL at 05:09

## 2023-09-15 RX ADMIN — Medication 10 MG: at 07:09

## 2023-09-15 RX ADMIN — BUPIVACAINE HYDROCHLORIDE 20 ML: 2.5 INJECTION, SOLUTION EPIDURAL; INFILTRATION; INTRACAUDAL; PERINEURAL at 06:09

## 2023-09-15 RX ADMIN — SODIUM CHLORIDE, SODIUM GLUCONATE, SODIUM ACETATE, POTASSIUM CHLORIDE, MAGNESIUM CHLORIDE, SODIUM PHOSPHATE, DIBASIC, AND POTASSIUM PHOSPHATE: .53; .5; .37; .037; .03; .012; .00082 INJECTION, SOLUTION INTRAVENOUS at 10:09

## 2023-09-15 RX ADMIN — MIDAZOLAM HYDROCHLORIDE 2 MG: 2 INJECTION, SOLUTION INTRAMUSCULAR; INTRAVENOUS at 07:09

## 2023-09-15 RX ADMIN — ACETAMINOPHEN 1000 MG: 500 TABLET ORAL at 05:09

## 2023-09-15 RX ADMIN — HALOPERIDOL LACTATE 0.5 MG: 5 INJECTION, SOLUTION INTRAMUSCULAR at 11:09

## 2023-09-15 RX ADMIN — ROCURONIUM BROMIDE 50 MG: 10 INJECTION, SOLUTION INTRAVENOUS at 07:09

## 2023-09-15 RX ADMIN — PHENYLEPHRINE HYDROCHLORIDE 50 MCG: 10 INJECTION INTRAVENOUS at 07:09

## 2023-09-15 RX ADMIN — PROPOFOL 200 MG: 10 INJECTION, EMULSION INTRAVENOUS at 07:09

## 2023-09-15 RX ADMIN — FENTANYL CITRATE 100 MCG: 50 INJECTION, SOLUTION INTRAMUSCULAR; INTRAVENOUS at 06:09

## 2023-09-15 RX ADMIN — PHENYLEPHRINE HYDROCHLORIDE 100 MCG: 10 INJECTION INTRAVENOUS at 08:09

## 2023-09-15 RX ADMIN — ONDANSETRON 4 MG: 2 INJECTION INTRAMUSCULAR; INTRAVENOUS at 07:09

## 2023-09-15 RX ADMIN — MIDAZOLAM 2 MG: 1 INJECTION INTRAMUSCULAR; INTRAVENOUS at 06:09

## 2023-09-15 RX ADMIN — METHOCARBAMOL 750 MG: 750 TABLET ORAL at 11:09

## 2023-09-15 RX ADMIN — DEXTROSE MONOHYDRATE 2 G: 50 INJECTION, SOLUTION INTRAVENOUS at 07:09

## 2023-09-15 RX ADMIN — FENTANYL CITRATE 50 MCG: 50 INJECTION, SOLUTION INTRAMUSCULAR; INTRAVENOUS at 07:09

## 2023-09-15 RX ADMIN — EPHEDRINE SULFATE 10 MG: 50 INJECTION INTRAVENOUS at 08:09

## 2023-09-15 RX ADMIN — SODIUM CHLORIDE, SODIUM GLUCONATE, SODIUM ACETATE, POTASSIUM CHLORIDE, MAGNESIUM CHLORIDE, SODIUM PHOSPHATE, DIBASIC, AND POTASSIUM PHOSPHATE: .53; .5; .37; .037; .03; .012; .00082 INJECTION, SOLUTION INTRAVENOUS at 08:09

## 2023-09-15 RX ADMIN — Medication 10 MG: at 10:09

## 2023-09-15 RX ADMIN — SODIUM CHLORIDE: 9 INJECTION, SOLUTION INTRAVENOUS at 05:09

## 2023-09-15 RX ADMIN — FENTANYL CITRATE 50 MCG: 50 INJECTION, SOLUTION INTRAMUSCULAR; INTRAVENOUS at 10:09

## 2023-09-15 RX ADMIN — DEXAMETHASONE SODIUM PHOSPHATE 8 MG: 4 INJECTION, SOLUTION INTRAMUSCULAR; INTRAVENOUS at 07:09

## 2023-09-15 NOTE — ANESTHESIA PROCEDURE NOTES
Intubation    Date/Time: 9/15/2023 7:35 AM    Performed by: Lalita Huston CRNA  Authorized by: Alexandria Casillas MD    Intubation:     Induction:  Intravenous    Intubated:  Postinduction    Mask Ventilation:  Easy mask    Attempts:  1    Attempted By:  CRNA    Method of Intubation:  Video laryngoscopy    Blade:  Dotson 3    Laryngeal View Grade: Grade I - full view of cords      Difficult Airway Encountered?: No      Complications:  None    Airway Device:  Oral endotracheal tube    Airway Device Size:  7.0    Style/Cuff Inflation:  Cuffed    Tube secured:  21    Secured at:  The lips    Placement Verified By:  Capnometry    Complicating Factors:  None    Findings Post-Intubation:  BS equal bilateral and atraumatic/condition of teeth unchanged

## 2023-09-15 NOTE — PROGRESS NOTES
Pre op completed. All concerns addressed. Patient belongings given to wife. Bed in lowest position. Call light within reach. Family at beside.

## 2023-09-15 NOTE — ANESTHESIA PREPROCEDURE EVALUATION
09/15/2023  Ewa Welch is a 23 y.o., female.      Pre-op Assessment    I have reviewed the Patient Summary Reports.       I have reviewed the Medications.     Review of Systems  Anesthesia Hx:  No problems with previous Anesthesia  History of prior surgery of interest to airway management or planning: Previous anesthesia: General 2/12/2018  T&A with general anesthesia.  Procedure performed at an [facility]. Airway issues documented on chart review include mask, easy, GETA, easy direct laryngoscopy , view on direct laryngoscopy Grade I   Denies Personal Hx of Anesthesia complications.   Social:  Non-Smoker, Social Alcohol Use    Hematology/Oncology:  Hematology Normal   Oncology Normal     EENT/Dental:   chronic allergic rhinitis H/O Tonsillar Hypertrophy, S/P T&A, [place] tooth extraction   Cardiovascular:  Cardiovascular Normal Exercise tolerance: good  Denies CAD.     Denies Angina.  Denies NEWTON.    Pulmonary:  Pulmonary Normal  Denies Asthma.  Denies Shortness of breath.    Renal/:  Renal/ Normal  Denies Chronic Renal Disease.     Hepatic/GI:  Hepatic/GI Normal  Denies GERD. Denies Liver Disease.    Musculoskeletal:   Tear of right acetabular labrum,  Femoroacetabular impingement of right hip,  H/O Toenail removal left big toe   OB/GYN/PEDS:  H/O Pilonidal cyst   Neurological:  Neurology Normal  Denies Headaches. Denies Seizures.    Endocrine:  Endocrine Normal Denies Diabetes. Denies Hypothyroidism.    Psych:   anxiety H/O self-harming behavior       Past Medical History:   Diagnosis Date    Generalized anxiety disorder 03/29/2016    Infected pilonidal cyst 4/5/2017    Seasonal allergic rhinitis due to pollen 4/4/2013    Self-harming behavior     Tonsillar hypertrophy 2/12/2018     Past Surgical History:   Procedure Laterality Date    ADENOIDECTOMY  02/12/2018    Dr. Bernard    OTHER  SURGICAL HISTORY Left     toenail removal big toe    TONSILLECTOMY  02/12/2018    Dr. Bernard    WISDOM TOOTH EXTRACTION Bilateral        Physical Exam  General: Well nourished and Cooperative    Airway:  Mallampati: II   Mouth Opening: Normal  TM Distance: Normal  Tongue: Normal    Dental:  Intact    Chest/Lungs:  Normal Respiratory Rate, Clear to auscultation    Heart:  Rate: Normal  Rhythm: Regular Rhythm      Anesthesia Assessment: Preoperative EQUATION    Planned Procedure: Procedure(s) (LRB):  ARTHROSCOPY, HIP, WITH ACETABULOPLASTY, WITH REPAIR OF LABRUM IF INDICATED (Right)  ARTHROSCOPY, HIP, WITH FEMOROPLASTY (Right)  Requested Anesthesia Type:General  Surgeon: Leonard Becker MD  Service: Orthopedics  Known or anticipated Date of Surgery:9/15/2023    Surgeon notes: reviewed    Electronic QUestionnaire Assessment completed via nurse interview with patient.        Triage considerations:     The patient has no apparent active cardiac condition (No unstable coronary Syndrome such as severe unstable angina or recent [<1 month] myocardial infarction, decompensated CHF, severe valvular   disease or significant arrhythmia)    Previous anesthesia records:GETA, Easy airway, Easy intubation, and No problems    Last PCP note: 6-12 months ago , within [facility]       Other important co-morbidities:  no co-morbidities noted                 Instructions given. (See in Nurse's note)      Ht: 5'6  Wt: 138 lb  BMI: 22.31      Anesthesia Plan  Type of Anesthesia, risks & benefits discussed:    Anesthesia Type: Gen ETT, Regional  Intra-op Monitoring Plan: Standard ASA Monitors  Post Op Pain Control Plan: multimodal analgesia, peripheral nerve block and IV/PO Opioids PRN  Induction:  IV  Airway Plan: Direct  Informed Consent: Informed consent signed with the Patient and all parties understand the risks and agree with anesthesia plan.  All questions answered.   ASA Score: 1    Ready For Surgery From Anesthesia Perspective.        .

## 2023-09-15 NOTE — ANESTHESIA PROCEDURE NOTES
SHELLY NGUYỄN    Patient location during procedure: pre-op   Block not for primary anesthetic.  Reason for block: at surgeon's request and post-op pain management   Post-op Pain Location: right hip pain   Start time: 9/15/2023 6:40 AM  Timeout: 9/15/2023 6:38 AM   End time: 9/15/2023 6:42 AM    Staffing  Authorizing Provider: Alexandria Casillas MD  Performing Provider: Antonino Cedeño MD    Staffing  Performed by: Antonino Cedeño MD  Authorized by: Alexandria Casillas MD    Preanesthetic Checklist  Completed: patient identified, IV checked, site marked, risks and benefits discussed, surgical consent, monitors and equipment checked, pre-op evaluation and timeout performed  Peripheral Block  Patient position: supine  Prep: ChloraPrep  Patient monitoring: heart rate, continuous pulse ox and cardiac monitor  Block type: PENG  Laterality: right  Injection technique: single shot  Needle  Needle type: Stimuplex   Needle gauge: 21 G  Needle length: 4 in  Needle localization: ultrasound guidance   -ultrasound image captured on disc.  Assessment  Injection assessment: negative aspiration and negative parasthesia  Paresthesia pain: none  Heart rate change: no  Slow fractionated injection: yes  Pain Tolerance: comfortable throughout block and no complaints  Medications:    Medications: bupivacaine (pf) (MARCAINE) injection 0.25% - Perineural   20 mL - 9/15/2023 6:42:00 AM    Additional Notes  VSS. DOSC nurse monitoring vitals throughout  Local injected incrementally after neg asp  Local with 1: 400 k epi, 1 mg PF Decadron and 50 ug  PF Clonidine  Pt tolerated well

## 2023-09-15 NOTE — TRANSFER OF CARE
"Anesthesia Transfer of Care Note    Patient: Ewa Welch    Procedure(s) Performed: Procedure(s) (LRB):  ARTHROSCOPY, HIP, WITH ACETABULOPLASTY, WITH REPAIR OF LABRUM IF INDICATED (Right)  FEMOROPLASTY, ARTHROSCOPIC (Right)    Patient location: PACU    Anesthesia Type: general    Transport from OR: Transported from OR on 6-10 L/min O2 by face mask with adequate spontaneous ventilation    Post pain: adequate analgesia    Post assessment: no apparent anesthetic complications and tolerated procedure well    Post vital signs: stable    Level of consciousness: awake, alert and oriented    Nausea/Vomiting: no nausea/vomiting    Complications: none    Transfer of care protocol was followed      Last vitals:   Visit Vitals  /61 (BP Location: Right arm, Patient Position: Lying)   Pulse 75   Temp 36.5 °C (97.7 °F) (Skin)   Resp 17   Ht 5' 6" (1.676 m)   Wt 59.9 kg (132 lb)   LMP 08/20/2023 (Approximate)   SpO2 100%   Breastfeeding No   BMI 21.31 kg/m²     "

## 2023-09-15 NOTE — PLAN OF CARE
VSS.  Patient tolerating oral liquids without difficulty.   No apparent s&s of distress noted at this time, no complaints voiced at this time.   Discharge instructions reviewed with patient/family/friend with good verbal feedback received.   Post op medications obtained by wife.  DME brace and ice pack present  Patient ready for discharge.

## 2023-09-15 NOTE — ANESTHESIA POSTPROCEDURE EVALUATION
Anesthesia Post Evaluation    Patient: Ewa Welch    Procedure(s) Performed: Procedure(s) (LRB):  ARTHROSCOPY, HIP, WITH ACETABULOPLASTY, WITH REPAIR OF LABRUM IF INDICATED (Right)  FEMOROPLASTY, ARTHROSCOPIC (Right)    Final Anesthesia Type: general      Patient location during evaluation: PACU  Patient participation: Yes- Able to Participate  Level of consciousness: awake and alert  Post-procedure vital signs: reviewed and stable  Pain management: adequate  Airway patency: patent    PONV status at discharge: No PONV  Anesthetic complications: no      Cardiovascular status: blood pressure returned to baseline  Respiratory status: unassisted  Hydration status: euvolemic  Follow-up not needed.          Vitals Value Taken Time   /61 09/15/23 1146   Temp 98.4 09/15/23 1155   Pulse 75 09/15/23 1155   Resp 17 09/15/23 1155   SpO2 100 % 09/15/23 1155   Vitals shown include unvalidated device data.      Event Time   Out of Recovery 11:15:00         Pain/James Score: Pain Rating Prior to Med Admin: 5 (9/15/2023 11:36 AM)  James Score: 10 (9/15/2023 10:59 AM)

## 2023-09-15 NOTE — PLAN OF CARE
Patient tolerated procedure well.  VSS, complaints of pain well-controlled with interventions, tolerating po.  Preparing for discharge.  AVS reviewed with patient and her wife at the bedside. Verbalized understanding of S/S of complications, site care, activity and bathing restrictions, pain control, medication administration and frequency, follow up and general recovery.  Confirmed delivery of meds at bedside. IV to be removed prior to discharge.

## 2023-09-15 NOTE — OP NOTE
DATE OF PROCEDURE: 09/15/2023    SURGEON: Leonard Becker MD    ASSISTANT:  Anthony ODONNELL  There was no available resident or fellow in the services of a physician assistant were required    PREOPERATIVE DIAGNOSIS:    Right acetabular labral tear  Right femoroacetabular impingement    POSTOPERATIVE DIAGNOSIS:   Right acetabular labral tear  Right femoroacetabular impingement      PROCEDURE PERFORMED:   Right arthroscopic acetabular labral repair  Right arthroscopic osteoplasty of cam lesion  Right arthroscopic capsular plication      ANESTHESIA: General with blocked    BLOOD LOSS: Minimal.     IMPLANTS:  Implant Name Type Inv. Item Serial No.  Lot No. LRB No. Used Action   LoopLoc Knotless Implant    [employer] [id number] Right 3 Implanted   ANCHOR SUT FIBERTAK 1.8 KNTLS - [id number]  ANCHOR SUT FIBERTAK 1.8 KNTLS  [employer] [id number] Right 1 Wasted   ANCHOR SUT FIBERTAK 1.8 KNTLS - [id number]  ANCHOR SUT FIBERTAK 1.8 KNTLS  [employer] [id number] Right 4 Implanted         DRAINS: None.     COMPLICATIONS: None.     TRACTION TIME: 80 minutes.    INTRAOPERATIVE FINDINGS:  Intact cartilage on the femoral head and acetabulum.  Labral tear from 11-2 o'clock.  Cam impingement.    BRIEF INDICATION OF MEDICAL NECESSITY:   Ewa Welch is a 23 y.o. female is well known to our clinic with a long history of right hip pain.  she had advanced imaging, including X-rays and MRI, which demonstrated acetabular labral tear.  After discussion with the patient was determined the best course of action would be to proceed to the operating room for hip arthroscopy.  Procedures, as well as the risks, benefits, and alternatives, were explained to the patient in great detail all questions were answered.  Informed consent was obtained.      PROCEDURE IN DETAIL:   The patient was identified in the preoperative holding area and the site was marked.  A PENG block was administered by the anesthesia team and she  was taken to the operating room where he remained in the supine position on the stretcher.  Anesthetic agents were then administered.  Exam under anesthesia performed, see the detailed findings above.  The well-padded traction boots were applied to both feet.  she was then transferred to the hip arthroscopy table.  His feet were placed in the darby the knee was position were all bony prominences were well padded.   A time-out was undertaken around the patient, site, surgery, surgeon, and administration preoperative antibiotics.  All was agreed upon we proceeded.  Fluoroscopy was then brought in to identify appropriate hip visualization on the table.  The patient was then placed in Trendelenburg positioning.  With the pelvis stabilized, counter traction was applied to the contralateral leg and this was locked in positioning.  While holding the pelvis stable, traction was applied to the operative limb.  Fluoroscopy was used to insure that there was appropriate distraction of the hip joint space to allow for instrumentation.    The right hip was then prepped and draped in standard sterile fashion.    Under fluoroscopic guidance, an 18 gauge spinal needle was introduced through the anterior lateral portal area.  Fluoroscopic imaging confirmed intra-articular positioning.  The stylet was removed and a flexible guidewire was placed.  An 11 blade knife was used to create our skin incision and the flow port cannula was introduced into the hip joint with care not to disturb the articular cartilage.  The camera was then inserted.  While viewing the anterior superior aspect of the hip joint, a spinal needle was used to create the appropriate trajectory to our mid anterior portal.  Once this appropriate positioning was confirmed which would leave enough capsule both the acetabular and femoral side, a flexible guide pin was introduced.  Eleven blade knife was used to create the skin incision and a flow port cannula was then  inserted.  The camera was then switched to the mid anterior portal we looked back on our anterior lateral cannula.  Under direct visualization we confirmed that this was not through the labrum.  A Samurai knife was then inserted through the anterior lateral cannula and the cannula was withdrawn slightly.  An intraportal capsulotomy was begun.  Once we achieved a knot of the capsulotomy cut he from this position, the cannula in the anterolateral portal was then reintroduced into the joint and the Samurai blade was removed.  The camera was then returned to the anterolateral portal.  The Samurai knife was then inserted to the mid anterior portal in the interportal capsulotomy was completed.  Using a Nany pass, a suture was shuttled through the superior leaflet of the capsule in a simple fashion.  A percutaneous proximal anterior portal was established under spinal needle guidance and the suture limbs were retrieved through here using an arthroscopic grasper.  Tension was applied and these were secured with the hemostat for retraction of the capsule during our acetabular preparation.    At this point the diagnostic arthroscopy was performed, see the detailed findings above.  The cartilage on the femoral head appeared intact.  The cartilage on the acetabular surface appeared intact.  The hip labrum demonstrated a tear from 11-2 o'clock.    A distal anterior lateral portal was then established under spinal needle guidance.    We now began to prepare for labral repair.  Using a combination of an arthroscopic shaver and radiofrequency ablator the soft tissue was removed from the superior surface of the acetabular rim.        The depth of the distal anterior lateral portal was then measured and a cannula was cut to match this length.  This cannula was then inserted into the distal anterior lateral portal to allow passage insertion of our anchors.  We began our repair at the most anterior aspect of the tear.  The anchors were  placed through the distal anterior lateral portal.  A 1.8 mm knotless FiberTak anchor was placed under direct visualization at the 2 o'clock position and this had great purchase.  Using a Nany pass from the mid anterior portal, the repair suture was shuttled through the anterior aspect of the tear to perform a simple technique.  The repair suture was then shuttled through the knotless mechanism and this was tensioned down until an anatomic reduction was achieved.  The repair suture was then cut.  We then placed a 2nd 1.8 mm knotless FiberTak at the 1 position, this had great purchase.  A Nany pass was used through the mid anterior portal and the repair suture was shuttled through the the labrum to perform a simple technique repair.  The repair suture was then shuttled through the knotless mechanism and tensioned until an anatomic reduction was achieved and the suture was then cut.  Another 1.8 mm knotless FiberTak anchor was then used at the most posterior aspect of the tear, at the 12 o'clock position, this had great purchase.  A Nany pass was used through the mid anterior portal to shuttle the repair suture through the labrum using a simple technique.  This was then passed the knotless mechanism and tensioned appropriately until an anatomic reduction was achieved and the suture limb was cut. Another 1.8 mm knotless FiberTak anchor was then used at the most posterior aspect of the tear, at the 11 o'clock position, this had great purchase.  A Nany pass was used through the mid anterior portal to shuttle the repair suture through the labrum using a simple technique.  This was then passed the knotless mechanism and tensioned appropriately until an anatomic reduction was achieved and the suture limb was cut.  At this point our repair was complete and final images of the repair were then taken.    The traction was now released, the total traction time was 80 minutes.    The hip was then flexed to about 30° and external  rotation was performed,fluoroscopy was brought and a modified Grigsby view was taken to identify the cam lesion.  This was identified using a radiofrequency ablator.  The cam lesion was then outlined using the radiofrequency ablator to remove the soft tissue from the femoral head neck junction.  A suture was then loaded into the Nany pass and this was placed through the distal anterior lateral portal through the inferior leaflet of the capsule.  This was retrieved through the distal anterior lateral portal and secured with a hemostat to provide traction on this and allow for our cam resection.  Once this was done any cam lesion was completely outlined, the soft tissue was removed using the radiofrequency ablator.  An arthroscopic bur was then used to the mid anterior portal to perform our cam resection.  Once this was complete the hip was placed in full flexion and internal external rotation was performed under direct arthroscopic visualization to confirm that there was no cam impingement.  At this point the cam resection was completed.  An arthroscopic shaver was then reintroduced to the distal anterior lateral portal and the soft tissue and bone debris was removed.    The decision was made to close the hip capsule.  To loop lock devices were then passed through the hip capsule.  This was sequentially tightened down and then cut to close the capsule.    At this point the procedure was complete and all instruments were removed.  The wounds were closed with 3-0 Vicryl suture and 3-0 nylon suture.  They were covered with sterile dressings.  A hip arthroscopy postoperative brace was then applied.  The patient was then returned to the supine position and transferred to the stretcher.  The patient was awakened from the anesthetic agents.  The procedure was complete without complication and tolerated well by the patient.  Was then taken to the postanesthesia care unit in stable condition    POSTOPERATIVE PLAN:  She will  follow the hip arthroscopy with acetabular labral repair and osteoplasty protocol.  Return to clinic in 2 weeks.

## 2023-09-15 NOTE — ANESTHESIA PROCEDURE NOTES
Right SHELLY SS Block    Patient location during procedure: pre-op   Block not for primary anesthetic.  Reason for block: at surgeon's request and post-op pain management   Post-op Pain Location: Right Hip Pain   Start time: 9/15/2023 6:35 AM  Timeout: 9/15/2023 6:35 AM   End time: 9/15/2023 6:40 AM    Staffing  Authorizing Provider: Alexandria Casillas MD  Performing Provider: Antonino Cedeño MD    Staffing  Performed by: Antonino Cedeño MD  Authorized by: Alexandria Casillas MD    Preanesthetic Checklist  Completed: patient identified, IV checked, site marked, risks and benefits discussed, surgical consent, monitors and equipment checked, pre-op evaluation and timeout performed  Peripheral Block  Patient position: sitting  Prep: ChloraPrep  Patient monitoring: heart rate, cardiac monitor, continuous pulse ox, continuous capnometry and frequent blood pressure checks  Block type: PENG  Laterality: right  Injection technique: single shot  Needle  Needle type: Echogenic   Needle gauge: 20 G  Needle length: 4 in  Needle localization: anatomical landmarks and ultrasound guidance   -ultrasound image captured on disc.  Assessment  Injection assessment: negative aspiration, negative parasthesia and local visualized surrounding nerve  Paresthesia pain: none  Heart rate change: no  Slow fractionated injection: yes  Pain Tolerance: comfortable throughout block and no complaints  Medications:    Medications: bupivacaine 0.25%-EPINEPHrine (PF) 1:200,000 injection - Intrathecal   20 mL - 9/15/2023 6:38:00 AM  dexAMETHasone sodium phos (PF) injection 10 mg/mL - Intravenous   2 mg - 9/15/2023 6:38:00 AM    Additional Notes  Patient tolerated well.  [facility]. Added clonidine 50 mcg

## 2023-09-15 NOTE — BRIEF OP NOTE
[facility] - Surgery (Hospital)  Brief Operative Note    Surgery Date: 9/15/2023     Surgeon(s) and Role:     * Leonard Becker MD - Primary    Assisting Surgeon: None    Pre-op Diagnosis:  Tear of right acetabular labrum, initial encounter [S73.191A]  Femoroacetabular impingement of right hip [M25.851]    Post-op Diagnosis:  Post-Op Diagnosis Codes:     * Tear of right acetabular labrum, initial encounter [S73.191A]     * Femoroacetabular impingement of right hip [M25.851]    Procedure(s) (LRB):  ARTHROSCOPY, HIP, WITH ACETABULOPLASTY, WITH REPAIR OF LABRUM IF INDICATED (Right)  FEMOROPLASTY, ARTHROSCOPIC (Right)    Anesthesia: General    Operative Findings: labral tear and TOMMY    Estimated Blood Loss: * No values recorded between 9/15/2023  8:10 AM and 9/15/2023 10:13 AM *         Specimens:   Specimen (24h ago, onward)      None              Discharge Note    OUTCOME: Patient tolerated treatment/procedure well without complication and is now ready for discharge.    DISPOSITION: Home or Self Care    FINAL DIAGNOSIS:  Femoroacetabular impingement of right hip    FOLLOWUP: In clinic    DISCHARGE INSTRUCTIONS:    Discharge Procedure Orders   Diet general     Leave dressing on - Keep it clean, dry, and intact until clinic visit     Call MD for:  temperature >100.4     Call MD for:  persistent nausea and vomiting     Call MD for:  severe uncontrolled pain     Call MD for:  difficulty breathing, headache or visual disturbances     Call MD for:  redness, tenderness, or signs of infection (pain, swelling, redness, odor or green/yellow discharge around incision site)     Call MD for:  hives     Call MD for:  persistent dizziness or light-headedness     Call MD for:  extreme fatigue

## 2023-09-15 NOTE — OPERATIVE NOTE ADDENDUM
Certification of Assistant at Surgery       Surgery Date: 9/15/2023     Participating Surgeons:  Surgeon(s) and Role:     * Leonard Becker MD - Primary    Procedures:  Procedure(s) (LRB):  ARTHROSCOPY, HIP, WITH ACETABULOPLASTY, WITH REPAIR OF LABRUM IF INDICATED (Right)  FEMOROPLASTY, ARTHROSCOPIC (Right)    Assistant Surgeon's Certification of Necessity:  I understand that section 1842 (b) (6) (d) of the Social Security Act generally prohibits Medicare Part B reasonable charge payment for the services of assistants at surgery in teaching hospitals when qualified residents are available to furnish such services. I certify that the services for which payment is claimed were medically necessary, and that no qualified resident was available to perform the services. I further understand that these services are subject to post-payment review by the Medicare carrier.      Anthony Hogan PA-C    09/15/2023  12:55 PM

## 2023-09-18 ENCOUNTER — CLINICAL SUPPORT (OUTPATIENT)
Dept: REHABILITATION | Facility: HOSPITAL | Age: 23
End: 2023-09-18
Payer: OTHER GOVERNMENT

## 2023-09-18 DIAGNOSIS — S73.191A TEAR OF RIGHT ACETABULAR LABRUM, INITIAL ENCOUNTER: ICD-10-CM

## 2023-09-18 DIAGNOSIS — R53.1 DECREASED STRENGTH: ICD-10-CM

## 2023-09-18 DIAGNOSIS — R26.9 ABNORMAL GAIT: ICD-10-CM

## 2023-09-18 DIAGNOSIS — M25.60 DECREASED RANGE OF MOTION: ICD-10-CM

## 2023-09-18 DIAGNOSIS — M25.851 FEMOROACETABULAR IMPINGEMENT OF RIGHT HIP: ICD-10-CM

## 2023-09-18 PROCEDURE — 97161 PT EVAL LOW COMPLEX 20 MIN: CPT | Mod: PN

## 2023-09-18 PROCEDURE — 97110 THERAPEUTIC EXERCISES: CPT | Mod: PN

## 2023-09-18 PROCEDURE — 97140 MANUAL THERAPY 1/> REGIONS: CPT | Mod: PN

## 2023-09-18 NOTE — PLAN OF CARE
[facility]   Physical Therapy Initial Evaluation      Name: Ewa Welch  [facility] Number: [id number]    Therapy Diagnosis:   Encounter Diagnoses   Name Primary?    Tear of right acetabular labrum, initial encounter     Femoroacetabular impingement of right hip     Abnormal gait     Decreased range of motion     Decreased strength         Physician: Milton Hogan*    Physician Orders: PT Eval and Treat   Medical Diagnosis from Referral: S73.191A (ICD-10-CM) - Tear of right acetabular labrum, initial encounter M25.851 (ICD-10-CM) - Femoroacetabular impingement of right hip  Evaluation Date: 9/18/2023  Authorization Period Expiration: 12/31/2023  Plan of Care Expiration: 1/16/2024  Progress Note Due: 10/18/2023  Visit # / Visits authorized: 1/ 1   FOTO: 1/5    Time In: 2:01pm  Time Out: 3:00pm  Total Appointment Time (timed & untimed codes): 59 minutes    Precautions: Standard   DOC 6/15/2023  PROCEDURE PERFORMED:   Right arthroscopic acetabular labral repair  Right arthroscopic osteoplasty of cam lesion  Right arthroscopic capsular plication    She will follow the hip arthroscopy with acetabular labral repair and osteoplasty protocol.  Return to clinic in 2 weeks.  Subjective     Date of onset: 9/15/2023    History of current condition - Ewa reports: Pt reports having right hip surgery on 9/15/2023. Since surgery sleep has been on and off. She has been sleeping but its been hard to get into a position of that is comfortable and has been using lots of pillows. She has had this injury for 4 years and the marine core did not find anything. Then she saw [facility] doc and a labral tear was found. She did PT prior to surgery but still would have pain and couldn't progress. Is curious about brace fitting and what she can and cannot do. Retired form [employer].     Medical History:   Past Medical History:   Diagnosis Date    Generalized anxiety disorder 03/29/2016    Infected pilonidal  cyst 4/5/2017    Seasonal allergic rhinitis due to pollen 4/4/2013    Self-harming behavior     Tonsillar hypertrophy 2/12/2018       Surgical History:   Ewa Welch  has a past surgical history that includes OTHER SURGICAL HISTORY (Left); Tonsillectomy (02/12/2018); Adenoidectomy (02/12/2018); and [place] tooth extraction (Bilateral).    Medications:   Ewa has a current medication list which includes the following prescription(s): apixaban, celecoxib, fluticasone propionate, ibuprofen, indomethacin, loratadine, methocarbamol, naproxen, oxycodone, and promethazine.    Allergies:   Review of patient's allergies indicates:   Allergen Reactions    Latex, natural rubber Rash        Imaging, CT Impression:  Findings compatible with cam impingement of the anterior femoral head neck junction  Electronically signed by: Anup Sommers Jr    Prior Therapy: Pre op PT did well but couldn't get better.   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   Prior Level of Function: Independent  Current Level of Function: unable to walk without crutches.     Pain:  Current 4/10, worst 8/10, best 4/10   Location: right hip   Description: Aching, Throbbing, and Tight  Aggravating Factors: Sitting, Standing, Walking, and Night Time  Easing Factors: pain medication, ice, and TENS unit    Pts goals: Pt would like to be able to jog or walk in the quarter without hip pain, she would like to be able to drive for more than 20 min without pain.     Objective     Observation: pt appears well nourished and in no signs of distress  Posture: unable to fully determine standing posture, sitting with rounded shoulders and posterior pelvic tist  Gait: ambulating c bilateral axillary crutches, NWB  Palpation: TTP along incisions   Sensation: intact to touch  DTRs: NT  Myotomes: NT  Dermatomes: NT    Range of Motion/Strength:     Hip Right Left Pain/Dysfunction with Movement   AROM/PROM      flexion  45/90  "120/130    extension NT NT    abduction NT NT    adduction 20/25 30/45    Internal rotation NT NT    External rotation NT NT        L/E MMT Right Left Pain/Dysfunction with Movement   Hip Flexion 3/5 5/5    Hip Extension NT NT    Hip Abduction NT NT    Hip Adduction NT NT    Hip IR NT 4/5    Hip ER NT 4/5        Joint Mobility:   - Hip: no joint restrictions noted with minimal testing 2/2 procedure  - knee: no mobility deficits noted  - ankle: no mobility deficits noted.     CMS Impairment/Limitation/Restriction for FOTO Survey    Therapist reviewed FOTO scores for Ewa Welch on 9/18/2023.   FOTO documents entered into [employer] - see Media section.    Intake Score: 41%  Predicted Score:73%  Visits: 19       TREATMENT     Treatment Time In: 2:30  Treatment Time Out: 3:00  Total Treatment time separate from Evaluation: 30 minutes    Ewa received the following manual therapy techniques: Joint mobilizations were applied to the: right hip for 8 minutes, including:  Gentle circumduction mobilization     Ewa received therapeutic exercises to develop strength, endurance, ROM, flexibility, and posture for 22 minutes including:  Hand heel rocking gentle not through pan x10  Quad sets 20x5"  Glute squeezes/slight bridge [id number]  Hamstring isometrics [id number]     Home Exercises and Patient Education Provided    Education provided:   - Pt educated on POC  - Pt educated on HEP  - Pt educated on anatomy and physiology of current condition as it relates to signs and symptoms    Written Home Exercises Provided: yes.  Exercises were reviewed and Ewa was able to demonstrate them prior to the end of the session.  Ewa demonstrated good  understanding of the education provided.     See EMR under Patient Instructions for exercises provided 9/18/2023.    Assessment     Ewa is a 23 y.o. female referred to outpatient Physical Therapy with a medical diagnosis of s/p right hip labral repair, cam lesion osteoplasty, and capsular " plication. Patient presents with hip brace donned properly. Some adjustments to size were made and recommendations for comfort when resting. She presents with minimal pain and good motion for this stage of recovery. Not all strength was able to be tested 2/2 surgery. PT to continue to progress per protocol.     Patient prognosis is Good.   Patient will benefit from skilled outpatient Physical Therapy to address the deficits stated above and in the chart below, provide patient /family education, and to maximize patientt's level of independence.     Plan of care discussed with patient: Yes  Patient's spiritual, cultural and educational needs considered and patient is agreeable to the plan of care and goals as stated below:     Anticipated Barriers for therapy: none  Medical Necessity is demonstrated by the following  History  Co-morbidities and personal factors that may impact the plan of care [x] LOW: no personal factors / co-morbidities  [] MODERATE: 1-2 personal factors / co-morbidities  [] HIGH: 3+ personal factors / co-morbidities    Moderate / High Support Documentation:   Co-morbidities affecting plan of care: none    Personal Factors:   no deficits     Examination  Body Structures and Functions, activity limitations and participation restrictions that may impact the plan of care [x] LOW: addressing 1-2 elements  [] MODERATE: 3+ elements  [] HIGH: 4+ elements (please support below)    Moderate / High Support Documentation: difficulty walking and dressing     Clinical Presentation [x] LOW: stable  [] MODERATE: Evolving  [] HIGH: Unstable     Decision Making/ Complexity Score: low           Goals:  Short Term Goals (8 Weeks):  1. Pt will be compliant with initial HEP to supplement PT in restoring pain free function.  2. Pt will reduce worst pain to 4/10 in order to progress functional deficits  3. Pt will ambulate 1mile without pain in order to return to previous level of function  4. Pt will improve hip flex  strength to 4/5 in order to improve functional mobility     Long Term Goals (16 Weeks):  1. Pt will improve FOTO score to </= 73% limited to decrease perceived limitation with mobility.   2. Pt will reduce worst pain to 1/10 in order to progress functional deficits  3. Pt will have hip strength greater than 90% of contralateral lower extremity in on order to return to previous level of function  4. Pt will have full hip range of motion compared to contralateral lower extremity in order to return to previous level of function.     Plan     Plan of care Certification: 9/18/2023 to 1/16/2024.    Outpatient Physical Therapy 2 times weekly for 16 weeks to include the following interventions: Electrical Stimulation PRN, Gait Training, Manual Therapy, Moist Heat/ Ice, Neuromuscular Re-ed, Patient Education, Therapeutic Activities, and Therapeutic Exercise. Dry Needling PRN.    Audrey Joseph, PT, DPT, OCS

## 2023-09-20 ENCOUNTER — TELEPHONE (OUTPATIENT)
Dept: SPORTS MEDICINE | Facility: CLINIC | Age: 23
End: 2023-09-20
Payer: OTHER GOVERNMENT

## 2023-09-20 NOTE — TELEPHONE ENCOUNTER
Called patient's wife and let her know I uploaded an updated letter to the portal.     ----- Message from Carolina Trevizo sent at 9/20/2023 10:05 AM CDT -----  Regarding: Doctor's Note  Contact: [phone] wife  Pt wife called in and is requesting a call back. Pt wife stated her job needs a doctor's note that extends to 9/22/23 uploaded to the portal. Please call to further discuss. Thanks

## 2023-09-22 ENCOUNTER — CLINICAL SUPPORT (OUTPATIENT)
Dept: REHABILITATION | Facility: HOSPITAL | Age: 23
End: 2023-09-22
Payer: OTHER GOVERNMENT

## 2023-09-22 DIAGNOSIS — M25.60 DECREASED RANGE OF MOTION: ICD-10-CM

## 2023-09-22 DIAGNOSIS — R26.9 ABNORMAL GAIT: Primary | ICD-10-CM

## 2023-09-22 DIAGNOSIS — R53.1 DECREASED STRENGTH: ICD-10-CM

## 2023-09-22 PROCEDURE — 97110 THERAPEUTIC EXERCISES: CPT | Mod: PN,CQ

## 2023-09-22 NOTE — PROGRESS NOTES
[facility]   Physical Therapy Treatment Note      Name: Ewa Welch  Clinic Number: [id number]    Therapy Diagnosis: No diagnosis found.  Physician: Milton Hogan*    Visit Date: 9/22/2023      Therapy Diagnosis:        Encounter Diagnoses   Name Primary?    Tear of right acetabular labrum, initial encounter      Femoroacetabular impingement of right hip      Abnormal gait      Decreased range of motion      Decreased strength          Physician: Milton Hogan*     Physician Orders: PT Eval and Treat   Medical Diagnosis from Referral: S73.191A (ICD-10-CM) - Tear of right acetabular labrum, initial encounter M25.851 (ICD-10-CM) - Femoroacetabular impingement of right hip  Evaluation Date: 9/18/2023  Authorization Period Expiration: 12/31/2023  Plan of Care Expiration: 1/16/2024  Progress Note Due: 10/18/2023  Visit # / Visits authorized: 2/ eval + 20  FOTO: 1/5     Time In: 12:40 pm  Time Out: 1:30 pm  Total Appointment Time (timed & untimed codes): 50 minutes     Precautions: Standard   DOC 6/15/2023  PROCEDURE PERFORMED:   Right arthroscopic acetabular labral repair  Right arthroscopic osteoplasty of cam lesion  Right arthroscopic capsular plication     She will follow the hip arthroscopy with acetabular labral repair and osteoplasty protocol.  Return to clinic in 2 weeks.    PTA Visit #: 1/5       Subjective     Patient reports: She was suprisingly a little sore after the last session. However, she's just trying to be mindful and adhere to her precautions.   She was compliant with home exercise program.  Response to previous treatment: initial eval   Functional change: NA     Pain: 0/10  Location: right hip      Objective      Objective Measures updated at progress report unless specified.     Treatment     Ewa received the treatments listed below:      Ewa received the following manual therapy techniques: Joint mobilizations were applied to the: right hip for 8  "minutes, including:  Gentle circumduction mobilization      Ewa received therapeutic exercises to develop strength, endurance, ROM, flexibility, and posture for 22 minutes including:  DKTC w/ towel 10x 10sec hold   Hand heel rocking gentle not through pan x10  Quad sets 20x5"  Glute squeezes/slight bridge [id number]  Hamstring isometrics [id number]      Home Exercises and Patient Education Provided     Education provided:   - Pt educated on POC  - Pt educated on HEP  - Pt educated on anatomy and physiology of current condition as it relates to signs and symptoms     Written Home Exercises Provided: yes.  Exercises were reviewed and Ewa was able to demonstrate them prior to the end of the session.  Ewa demonstrated good  understanding of the education provided.      See EMR under Patient Instructions for exercises provided 9/18/2023.     Assessment    Ewa presented to PT with reports of minimal right hip pain.  Reviewed post op precautions, to which Ewa was knowledgeable and following.   Continued with ISO exercises for improved glute, hamstring, and quad strength. Ewa was able to perform all exercises without reports of increased right hip pain and discomfort. Will continue to progress per post op protocol.       Patient prognosis is Good.   Patient will benefit from skilled outpatient Physical Therapy to address the deficits stated above and in the chart below, provide patient /family education, and to maximize patientt's level of independence.      Plan of care discussed with patient: Yes  Patient's spiritual, cultural and educational needs considered and patient is agreeable to the plan of care and goals as stated below:      Anticipated Barriers for therapy: none      Goals:   Short Term Goals (8 Weeks):  1. Pt will be compliant with initial HEP to supplement PT in restoring pain free function.  2. Pt will reduce worst pain to 4/10 in order to progress functional deficits  3. Pt will ambulate 1mile " without pain in order to return to previous level of function  4. Pt will improve hip flex strength to 4/5 in order to improve functional mobility      Long Term Goals (16 Weeks):  1. Pt will improve FOTO score to </= 73% limited to decrease perceived limitation with mobility.   2. Pt will reduce worst pain to 1/10 in order to progress functional deficits  3. Pt will have hip strength greater than 90% of contralateral lower extremity in on order to return to previous level of function  4. Pt will have full hip range of motion compared to contralateral lower extremity in order to return to previous level of function.        Plan           Jennifer Funez, YESENIA

## 2023-09-25 ENCOUNTER — CLINICAL SUPPORT (OUTPATIENT)
Dept: REHABILITATION | Facility: HOSPITAL | Age: 23
End: 2023-09-25
Payer: OTHER GOVERNMENT

## 2023-09-25 DIAGNOSIS — R26.9 ABNORMAL GAIT: Primary | ICD-10-CM

## 2023-09-25 DIAGNOSIS — R53.1 DECREASED STRENGTH: ICD-10-CM

## 2023-09-25 DIAGNOSIS — M25.60 DECREASED RANGE OF MOTION: ICD-10-CM

## 2023-09-25 PROCEDURE — 97110 THERAPEUTIC EXERCISES: CPT | Mod: PN

## 2023-09-25 PROCEDURE — 97112 NEUROMUSCULAR REEDUCATION: CPT | Mod: PN

## 2023-09-25 PROCEDURE — 97140 MANUAL THERAPY 1/> REGIONS: CPT | Mod: PN

## 2023-09-25 PROCEDURE — 97530 THERAPEUTIC ACTIVITIES: CPT | Mod: PN

## 2023-09-25 NOTE — PROGRESS NOTES
[facility]   Physical Therapy Treatment Note      Name: Ewa Welch  [facility] Number: [id number]    Therapy Diagnosis:   Encounter Diagnoses   Name Primary?    Abnormal gait Yes    Decreased range of motion     Decreased strength      Physician: Milton Hogan*    Visit Date: 9/25/2023      Therapy Diagnosis:        Encounter Diagnoses   Name Primary?    Tear of right acetabular labrum, initial encounter      Femoroacetabular impingement of right hip      Abnormal gait      Decreased range of motion      Decreased strength          Physician: Milton Hogan*     Physician Orders: PT Eval and Treat   Medical Diagnosis from Referral: S73.191A (ICD-10-CM) - Tear of right acetabular labrum, initial encounter M25.851 (ICD-10-CM) - Femoroacetabular impingement of right hip  Evaluation Date: 9/18/2023  Authorization Period Expiration: 12/31/2023  Plan of Care Expiration: 1/16/2024  Progress Note Due: 10/18/2023  Visit # / Visits authorized: 2/ eval + 20  FOTO: 1/5     Time In: 12:40 pm  Time Out: 1:30 pm  Total Appointment Time (timed & untimed codes): 50 minutes     Precautions: Standard   DOC 6/15/2023  PROCEDURE PERFORMED:   Right arthroscopic acetabular labral repair  Right arthroscopic osteoplasty of cam lesion  Right arthroscopic capsular plication     She will follow the hip arthroscopy with acetabular labral repair and osteoplasty protocol.  Return to clinic in 2 weeks.    PTA Visit #: 1/5       Subjective     Patient reports: She was suprisingly a little sore after the last session. However, she's just trying to be mindful and adhere to her precautions.   She was compliant with home exercise program.  Response to previous treatment: initial eval   Functional change: NA     Pain: 0/10  Location: right hip      Objective      Objective Measures updated at progress report unless specified.     Treatment     Ewa received the treatments listed below:      Ewa received the  "following manual therapy techniques: Joint mobilizations were applied to the: right hip for 8 minutes, including:  Gentle circumduction mobilization      Ewa participated in neuromuscular re-education activities to improve: Coordination, Sense, Proprioception, and Posture for 20 minutes. The following activities were included:  Quad sets 20x5"  Glute squeezes/slight bridge [id number]  Hamstring isometrics [id number]     Ewa received therapeutic exercises to develop strength, endurance, ROM, flexibility, and posture for 15 minutes including:  DKTC w/ towel 10x 10sec hold   Hand heel rocking gentle not through pan x10  Seated hamstring gastroc mobilization 20x5"    Ewa participated in dynamic functional therapeutic activities to improve functional performance for 15 minutes, including:  Recumbent Bike 15min lv 2      Home Exercises and Patient Education Provided     Education provided:   - Pt educated on POC  - Pt educated on HEP  - Pt educated on anatomy and physiology of current condition as it relates to signs and symptoms     Written Home Exercises Provided: yes.  Exercises were reviewed and Ewa was able to demonstrate them prior to the end of the session.  Ewa demonstrated good  understanding of the education provided.      See EMR under Patient Instructions for exercises provided 9/18/2023.     Assessment    Ewa presented to PT with reports of no hip pain. She had some hip flexor related discomfort during some activities but it went away after repeated attempts. She was progressed to more functional activities today including the bike in order to progress range of motion and return to previous level of function.       Patient prognosis is Good.   Patient will benefit from skilled outpatient Physical Therapy to address the deficits stated above and in the chart below, provide patient /family education, and to maximize patientt's level of independence.      Plan of care discussed with patient: " Yes  Patient's spiritual, cultural and educational needs considered and patient is agreeable to the plan of care and goals as stated below:      Anticipated Barriers for therapy: none      Goals:   Short Term Goals (8 Weeks):  1. Pt will be compliant with initial HEP to supplement PT in restoring pain free function.  2. Pt will reduce worst pain to 4/10 in order to progress functional deficits  3. Pt will ambulate 1mile without pain in order to return to previous level of function  4. Pt will improve hip flex strength to 4/5 in order to improve functional mobility      Long Term Goals (16 Weeks):  1. Pt will improve FOTO score to </= 73% limited to decrease perceived limitation with mobility.   2. Pt will reduce worst pain to 1/10 in order to progress functional deficits  3. Pt will have hip strength greater than 90% of contralateral lower extremity in on order to return to previous level of function  4. Pt will have full hip range of motion compared to contralateral lower extremity in order to return to previous level of function.        Plan     Continue with plan of care as indicated      Audrey Joseph, PT

## 2023-09-27 ENCOUNTER — CLINICAL SUPPORT (OUTPATIENT)
Dept: REHABILITATION | Facility: HOSPITAL | Age: 23
End: 2023-09-27
Payer: OTHER GOVERNMENT

## 2023-09-27 DIAGNOSIS — R53.1 DECREASED STRENGTH: ICD-10-CM

## 2023-09-27 DIAGNOSIS — R26.9 ABNORMAL GAIT: Primary | ICD-10-CM

## 2023-09-27 DIAGNOSIS — M25.60 DECREASED RANGE OF MOTION: ICD-10-CM

## 2023-09-27 PROCEDURE — 97112 NEUROMUSCULAR REEDUCATION: CPT | Mod: PN

## 2023-09-27 PROCEDURE — 97530 THERAPEUTIC ACTIVITIES: CPT | Mod: PN

## 2023-09-27 PROCEDURE — 97140 MANUAL THERAPY 1/> REGIONS: CPT | Mod: PN

## 2023-09-27 PROCEDURE — 97110 THERAPEUTIC EXERCISES: CPT | Mod: PN

## 2023-09-27 NOTE — PROGRESS NOTES
[facility]   Physical Therapy Treatment Note      Name: Ewa Welch  [facility] Number: [id number]    Therapy Diagnosis:   Encounter Diagnoses   Name Primary?    Abnormal gait Yes    Decreased range of motion     Decreased strength        Physician: Milton Hogan*    Visit Date: 9/27/2023      Therapy Diagnosis:        Encounter Diagnoses   Name Primary?    Tear of right acetabular labrum, initial encounter      Femoroacetabular impingement of right hip      Abnormal gait      Decreased range of motion      Decreased strength          Physician: Milton Hogan*     Physician Orders: PT Eval and Treat   Medical Diagnosis from Referral: S73.191A (ICD-10-CM) - Tear of right acetabular labrum, initial encounter M25.851 (ICD-10-CM) - Femoroacetabular impingement of right hip  Evaluation Date: 9/18/2023  Authorization Period Expiration: 12/31/2023  Plan of Care Expiration: 1/16/2024  Progress Note Due: 10/18/2023  Visit # / Visits authorized: 3/20 + eval  FOTO: 3/5     Time In: 8:01am  Time Out: 9:00am  Total Appointment Time (timed & untimed codes): 59 minutes     Precautions: Standard   DOC 6/15/2023  PROCEDURE PERFORMED:   Right arthroscopic acetabular labral repair  Right arthroscopic osteoplasty of cam lesion  Right arthroscopic capsular plication     She will follow the hip arthroscopy with acetabular labral repair and osteoplasty protocol.  Return to clinic in 2 weeks.  Subjective     Patient reports: A little achy today, cannot seem to get comfortable. Has soreness in the middle of the front of her hip more towards the groin.   She was compliant with home exercise program.  Response to previous treatment: felt great after riding the bike   Functional change: NA     Pain: 0/10  Location: right hip      Objective      Objective Measures updated at progress report unless specified.     Palpation: No pubic symphysis pain, TTP ant thigh  Observation: Visible bruising along ant  "thigh. No signs of infection    Treatment     Ewa received the treatments listed below:      Ewa received the following manual therapy techniques: Joint mobilizations were applied to the: right hip for 13 minutes, including:  Gentle circumduction mobilization  Gentle long axis distraction       Ewa participated in neuromuscular re-education activities to improve: Coordination, Sense, Proprioception, and Posture for 15 minutes. The following activities were included:  Quad sets 2x20x5"  Glute squeezes/slight bridge [id number]  Hamstring isometrics [id number]     Ewa received therapeutic exercises to develop strength, endurance, ROM, flexibility, and posture for 10 minutes including:  DKTC w/ towel 10x 10sec hold   Hand heel rocking gentle not through pan x10  Seated hamstring gastroc mobilization 20x5"    Ewa participated in dynamic functional therapeutic activities to improve functional performance for 20 minutes, including:  Recumbent Bike 20min lv 2      Home Exercises and Patient Education Provided     Education provided:   - Pt educated on POC  - Pt educated on HEP  - Pt educated on anatomy and physiology of current condition as it relates to signs and symptoms     Written Home Exercises Provided: yes.  Exercises were reviewed and Ewa was able to demonstrate them prior to the end of the session.  Ewa demonstrated good  understanding of the education provided.      See EMR under Patient Instructions for exercises provided 9/18/2023.     Assessment    Ewa presented to PT with reports of achiness. Pt attempted to put weight through her leg last night with increased discomfort. Suspect primary muscle soreness and normal healing causing current discomfort. Pt felt relief with long axis distraction graded mobilization today. She was slightly progressed to increased reps in order to further build strength in preparation for increased WB status.       Patient prognosis is Good.   Patient will " benefit from skilled outpatient Physical Therapy to address the deficits stated above and in the chart below, provide patient /family education, and to maximize patientt's level of independence.      Plan of care discussed with patient: Yes  Patient's spiritual, cultural and educational needs considered and patient is agreeable to the plan of care and goals as stated below:      Anticipated Barriers for therapy: none      Goals:   Short Term Goals (8 Weeks):  1. Pt will be compliant with initial HEP to supplement PT in restoring pain free function.  2. Pt will reduce worst pain to 4/10 in order to progress functional deficits  3. Pt will ambulate 1mile without pain in order to return to previous level of function  4. Pt will improve hip flex strength to 4/5 in order to improve functional mobility      Long Term Goals (16 Weeks):  1. Pt will improve FOTO score to </= 73% limited to decrease perceived limitation with mobility.   2. Pt will reduce worst pain to 1/10 in order to progress functional deficits  3. Pt will have hip strength greater than 90% of contralateral lower extremity in on order to return to previous level of function  4. Pt will have full hip range of motion compared to contralateral lower extremity in order to return to previous level of function.        Plan     Continue with plan of care as indicated      Audrey Joseph PT

## 2023-09-28 ENCOUNTER — OFFICE VISIT (OUTPATIENT)
Dept: SPORTS MEDICINE | Facility: CLINIC | Age: 23
End: 2023-09-28
Payer: OTHER GOVERNMENT

## 2023-09-28 VITALS
DIASTOLIC BLOOD PRESSURE: 60 MMHG | BODY MASS INDEX: 21.21 KG/M2 | HEART RATE: 67 BPM | HEIGHT: 66 IN | WEIGHT: 132 LBS | SYSTOLIC BLOOD PRESSURE: 108 MMHG

## 2023-09-28 DIAGNOSIS — Z98.890 S/P HIP ARTHROSCOPY: Primary | ICD-10-CM

## 2023-09-28 PROCEDURE — 99999 PR PBB SHADOW E&M-EST. PATIENT-LVL IV: ICD-10-PCS | Mod: PBBFAC,,, | Performed by: ORTHOPAEDIC SURGERY

## 2023-09-28 PROCEDURE — 99024 PR POST-OP FOLLOW-UP VISIT: ICD-10-PCS | Mod: ,,, | Performed by: ORTHOPAEDIC SURGERY

## 2023-09-28 PROCEDURE — 99999 PR PBB SHADOW E&M-EST. PATIENT-LVL IV: CPT | Mod: PBBFAC,,, | Performed by: ORTHOPAEDIC SURGERY

## 2023-09-28 PROCEDURE — 99214 OFFICE O/P EST MOD 30 MIN: CPT | Mod: PBBFAC | Performed by: ORTHOPAEDIC SURGERY

## 2023-09-28 PROCEDURE — 99024 POSTOP FOLLOW-UP VISIT: CPT | Mod: ,,, | Performed by: ORTHOPAEDIC SURGERY

## 2023-09-28 RX ORDER — CYCLOBENZAPRINE HCL 5 MG
5 TABLET ORAL DAILY PRN
Qty: 30 TABLET | Refills: 0 | Status: SHIPPED | OUTPATIENT
Start: 2023-09-28 | End: 2023-10-28

## 2023-09-28 NOTE — PROGRESS NOTES
Subjective:     Chief Complaint: Ewa Welch is a 23 y.o. female who had concerns including Post-op Evaluation of the Right Hip.    HPI    Patient is here s/p Right hip labral repair for 2 week post-op appointment. She reports 0/10 pain and is not taking anything for pain.  She is, however having worsening muscle spasms that wake her up at night. She denies any nausea, vomiting, fever, chills, shortness of breath, or calf pain. She is attending formal physical therapy at [facility].  T scope hip brace in place.    PROCEDURE PERFORMED by Leonard Becker MD on 09/15/2023:   Right arthroscopic acetabular labral repair  Right arthroscopic osteoplasty of cam lesion  Right arthroscopic capsular plication    Review of Systems   Constitutional: Negative.   HENT: Negative.     Eyes: Negative.    Cardiovascular: Negative.    Respiratory: Negative.     Endocrine: Negative.    Hematologic/Lymphatic: Negative.    Skin: Negative.    Musculoskeletal:  Positive for joint pain, joint swelling and muscle weakness. Negative for falls.   Neurological: Negative.    Psychiatric/Behavioral: Negative.     Allergic/Immunologic: Negative.          Pain Related Questions  Over the past 3 days, what was your average pain during activity? (I.e. running, jogging, walking, climbing stairs, getting dressed, ect.): 3  Over the past 3 days, what was your highest pain level?: 4  Over the past 3 days, what was your lowest pain level? : 2    Other  How many nights a week are you awakened by your affected body part?: 0  Was the patient's HEIGHT measured or patient reported?: Patient Reported  Was the patient's WEIGHT measured or patient reported?: Measured    Objective:     General: Ewa is well-developed, well-nourished, appears stated age, in no acute distress, alert and oriented to time, place and person.     General    Constitutional: She is oriented to person, place, and time. She appears well-developed and well-nourished. No distress.    Cardiovascular:  Intact distal pulses.            Neurological: She is alert and oriented to person, place, and time.   Psychiatric: She has a normal mood and affect. Her behavior is normal. Thought content normal.           Right Knee Exam     Inspection   Alignment:  normal  Effusion: absent    Right Hip Exam     Inspection   Scars: present  Swelling: absent  Bruising: absent  No deformity of hip.  Quadriceps Atrophy:  Negative  Erythema: absent    Other   Sensation: normal    Comments:  Incision sites healing well, without signs of erythema, infection, or wound dehiscence.      Vascular Exam     Right Pulses  Dorsalis Pedis:      2+  Posterior Tibial:      2+        Edema  Right Upper Leg: absent          Assessment:     Encounter Diagnosis   Name Primary?    S/P hip arthroscopy Yes        Plan:     1. Patient instructed to continue to utilize hinged T-scope hip brace for 2 more weeks.  May be unlocked during ambulation.  Will increase ROM to 0-120 degrees today.  She may progress to WBAT over the next week.      2. Suture tags removed Steri-Strips applied. Patient may now shower without covering incision site. Instructed not to submerge incision site in water for another 2 weeks    3. Continue daily ASA and naproxen b.i.d..  Patient was given prescription for Flexeril to take before bedtime for muscle spasms.    4. Continue [facility] per our hip labral repair protocol.     5. RTC to see Leonard Becker MD in 4 weeks for 6 week post-op evaluation      All of the patient's questions were answered. Patient was advised to call the clinic or contact me through the patient portal for any questions or concerns.         Patient questionnaires may have been collected.

## 2023-10-02 ENCOUNTER — CLINICAL SUPPORT (OUTPATIENT)
Dept: REHABILITATION | Facility: HOSPITAL | Age: 23
End: 2023-10-02
Payer: OTHER GOVERNMENT

## 2023-10-02 DIAGNOSIS — R53.1 DECREASED STRENGTH: ICD-10-CM

## 2023-10-02 DIAGNOSIS — R26.9 ABNORMAL GAIT: Primary | ICD-10-CM

## 2023-10-02 DIAGNOSIS — M25.60 DECREASED RANGE OF MOTION: ICD-10-CM

## 2023-10-02 PROCEDURE — 97112 NEUROMUSCULAR REEDUCATION: CPT | Mod: PN

## 2023-10-02 PROCEDURE — 97110 THERAPEUTIC EXERCISES: CPT | Mod: PN

## 2023-10-02 PROCEDURE — 97530 THERAPEUTIC ACTIVITIES: CPT | Mod: PN

## 2023-10-02 PROCEDURE — 97140 MANUAL THERAPY 1/> REGIONS: CPT | Mod: PN

## 2023-10-02 NOTE — PROGRESS NOTES
[facility]   Physical Therapy Treatment Note      Name: Ewa Welch  [facility] Number: [id number]    Therapy Diagnosis:   Encounter Diagnoses   Name Primary?    Abnormal gait Yes    Decreased range of motion     Decreased strength        Physician: Milton Hogan*    Visit Date: 10/2/2023      Therapy Diagnosis:        Encounter Diagnoses   Name Primary?    Tear of right acetabular labrum, initial encounter      Femoroacetabular impingement of right hip      Abnormal gait      Decreased range of motion      Decreased strength          Physician: Milton Hogan*     Physician Orders: PT Eval and Treat   Medical Diagnosis from Referral: S73.191A (ICD-10-CM) - Tear of right acetabular labrum, initial encounter M25.851 (ICD-10-CM) - Femoroacetabular impingement of right hip  Evaluation Date: 9/18/2023  Authorization Period Expiration: 12/31/2023  Plan of Care Expiration: 1/16/2024  Progress Note Due: 10/18/2023  Visit # / Visits authorized: 4/20 + eval  FOTO: 4/5     Time In: 2:00pm  Time Out: 3:00pm  Total Appointment Time (timed & untimed codes): 59 minutes     Precautions: Standard   DOC 6/15/2023  PROCEDURE PERFORMED:   Right arthroscopic acetabular labral repair  Right arthroscopic osteoplasty of cam lesion  Right arthroscopic capsular plication     She will follow the hip arthroscopy with acetabular labral repair and osteoplasty protocol.  Return to clinic in 2 weeks.  Subjective     Patient reports: Has been having pain like a pull like her hip needs to pop and cant get comfortable. Its right in the front of her hip.   She was compliant with home exercise program.  Response to previous treatment: felt great after riding the bike   Functional change: NA     Pain: 2/10  Location: right hip      Objective      Objective Measures updated at progress report unless specified.     Palpation: No pubic symphysis pain, TTP ant thigh  Observation: Visible bruising along ant thigh. No  "signs of infection    Treatment     Ewa received the treatments listed below:      Ewa received the following manual therapy techniques: Joint mobilizations were applied to the: right hip for 15 minutes, including:  Gentle circumduction mobilization  Gentle long axis distraction    Lateral hip distraction - great pain relief     Ewa participated in neuromuscular re-education activities to improve: Coordination, Sense, Proprioception, and Posture for 10 minutes. The following activities were included:  Quad sets 2x20x5"  Glute bridge [id number]  Hamstring isometrics [id number]     Ewa received therapeutic exercises to develop strength, endurance, ROM, flexibility, and posture for 10 minutes including:  DKTC w/ towel 10x 10sec hold   Hand heel rocking gentle not through pan x10  Seated hamstring gastroc mobilization 20x5"    Ewa participated in dynamic functional therapeutic activities to improve functional performance for 25 minutes, including:  Recumbent Bike 20min lv 2   Standing double limb/50% tolerance     Home Exercises and Patient Education Provided     Education provided:   - Pt educated on POC  - Pt educated on HEP  - Pt educated on anatomy and physiology of current condition as it relates to signs and symptoms     Written Home Exercises Provided: yes.  Exercises were reviewed and Ewa was able to demonstrate them prior to the end of the session.  Ewa demonstrated good  understanding of the education provided.      See EMR under Patient Instructions for exercises provided 9/18/2023.     Assessment    Ewa presented to PT with anterior hip pain. Lateral distraction relieved most of the discomfort and made it so that she was able to continue to perform exercises. Most likely slight impingement due to lack of activity. PT to progress over the next week as she is moving towards 50% wbing. See protocol for new exercises if needed.     Patient prognosis is Good.   Patient will benefit from " skilled outpatient Physical Therapy to address the deficits stated above and in the chart below, provide patient /family education, and to maximize patientt's level of independence.      Plan of care discussed with patient: Yes  Patient's spiritual, cultural and educational needs considered and patient is agreeable to the plan of care and goals as stated below:      Anticipated Barriers for therapy: none      Goals:   Short Term Goals (8 Weeks):  1. Pt will be compliant with initial HEP to supplement PT in restoring pain free function.  2. Pt will reduce worst pain to 4/10 in order to progress functional deficits  3. Pt will ambulate 1mile without pain in order to return to previous level of function  4. Pt will improve hip flex strength to 4/5 in order to improve functional mobility      Long Term Goals (16 Weeks):  1. Pt will improve FOTO score to </= 73% limited to decrease perceived limitation with mobility.   2. Pt will reduce worst pain to 1/10 in order to progress functional deficits  3. Pt will have hip strength greater than 90% of contralateral lower extremity in on order to return to previous level of function  4. Pt will have full hip range of motion compared to contralateral lower extremity in order to return to previous level of function.        Plan     Continue with plan of care as indicated      Audrey Joseph PT

## 2023-10-05 ENCOUNTER — CLINICAL SUPPORT (OUTPATIENT)
Dept: REHABILITATION | Facility: HOSPITAL | Age: 23
End: 2023-10-05
Payer: OTHER GOVERNMENT

## 2023-10-05 DIAGNOSIS — S73.191A TEAR OF RIGHT ACETABULAR LABRUM, INITIAL ENCOUNTER: Primary | ICD-10-CM

## 2023-10-05 PROCEDURE — 97530 THERAPEUTIC ACTIVITIES: CPT | Mod: PN,CQ

## 2023-10-05 PROCEDURE — 97112 NEUROMUSCULAR REEDUCATION: CPT | Mod: PN,CQ

## 2023-10-05 PROCEDURE — 97140 MANUAL THERAPY 1/> REGIONS: CPT | Mod: PN,CQ

## 2023-10-05 PROCEDURE — 97110 THERAPEUTIC EXERCISES: CPT | Mod: PN,CQ

## 2023-10-05 NOTE — PROGRESS NOTES
"[facility]   Physical Therapy Treatment Note      Name: Ewa Welch  [facility] Number: [id number]    Therapy Diagnosis:   Encounter Diagnosis   Name Primary?    Tear of right acetabular labrum, initial encounter Yes         Physician: Milton Hogan*    Visit Date: 10/5/2023      Therapy Diagnosis:        Encounter Diagnoses   Name Primary?    Tear of right acetabular labrum, initial encounter      Femoroacetabular impingement of right hip      Abnormal gait      Decreased range of motion      Decreased strength          Physician: Milton Hogan*     Physician Orders: PT Eval and Treat   Medical Diagnosis from Referral: S73.191A (ICD-10-CM) - Tear of right acetabular labrum, initial encounter M25.851 (ICD-10-CM) - Femoroacetabular impingement of right hip  Evaluation Date: 9/18/2023  Authorization Period Expiration: 12/31/2023  Plan of Care Expiration: 1/16/2024  Progress Note Due: 10/18/2023  Visit # / Visits authorized: 5/20 + eval  FOTO: 4/5     Time In: 2:00pm  Time Out: 3:00pm  Total Appointment Time (timed & untimed codes): 53 minutes     Precautions: Standard   DOC 6/15/2023  PROCEDURE PERFORMED:   Right arthroscopic acetabular labral repair  Right arthroscopic osteoplasty of cam lesion  Right arthroscopic capsular plication     She will follow the hip arthroscopy with acetabular labral repair and osteoplasty protocol.  Return to clinic in 2 weeks.  Subjective     Patient reports: "That stretch ya'll do to the hip feels so good!".     She was compliant with home exercise program.  Response to previous treatment: felt great after riding the bike   Functional change: NA     Pain: 2/10  Location: right hip      Objective      Objective Measures updated at progress report unless specified.     Palpation: No pubic symphysis pain, TTP ant thigh  Observation: Visible bruising along ant thigh. No signs of infection    Treatment     Ewa received the treatments listed below: " "     Ewa received the following manual therapy techniques: Joint mobilizations were applied to the: right hip for 13 minutes, including:  Gentle circumduction mobilization  Gentle long axis distraction    Lateral hip distraction - great pain relief     Ewa participated in neuromuscular re-education activities to improve: Coordination, Sense, Proprioception, and Posture for 10 minutes. The following activities were included:  Quad sets 2x20x5"  Glute bridge [id number]  Hamstring isometrics [id number]     Ewa received therapeutic exercises to develop strength, endurance, ROM, flexibility, and posture for 16 minutes including:  DKTC w/ towel 10x 10sec hold   Hand heel rocking gentle not through pan x10  Seated hamstring gastroc mobilization 20x5"  Prone Hip Ext 2x10  Prone HS Curls 3x10    Ewa participated in dynamic functional therapeutic activities to improve functional performance for 14 minutes, including:  Recumbent Bike 20min lv 2   Standing double limb/50% tolerance     Home Exercises and Patient Education Provided     Education provided:   - Pt educated on POC  - Pt educated on HEP  - Pt educated on anatomy and physiology of current condition as it relates to signs and symptoms     Written Home Exercises Provided: yes.  Exercises were reviewed and Ewa was able to demonstrate them prior to the end of the session.  Ewa demonstrated good  understanding of the education provided.      See EMR under Patient Instructions for exercises provided 9/18/2023.     Assessment    Ewa presented to PT with reports of continued anterior hip pain which decreases following manual therapy techniques.  Introduced [place] exercises for improved glute and hamstring strength, which Ewa was challenged with performing but able to complete without reports of increased pain or discomfort.  Will continue to progress towards 50% weightbearing and exercises progressions per post op protocol.   Patient prognosis is Good. "   Patient will benefit from skilled outpatient Physical Therapy to address the deficits stated above and in the chart below, provide patient /family education, and to maximize patientt's level of independence.      Plan of care discussed with patient: Yes  Patient's spiritual, cultural and educational needs considered and patient is agreeable to the plan of care and goals as stated below:      Anticipated Barriers for therapy: none      Goals:   Short Term Goals (8 Weeks):  1. Pt will be compliant with initial HEP to supplement PT in restoring pain free function.  2. Pt will reduce worst pain to 4/10 in order to progress functional deficits  3. Pt will ambulate 1mile without pain in order to return to previous level of function  4. Pt will improve hip flex strength to 4/5 in order to improve functional mobility      Long Term Goals (16 Weeks):  1. Pt will improve FOTO score to </= 73% limited to decrease perceived limitation with mobility.   2. Pt will reduce worst pain to 1/10 in order to progress functional deficits  3. Pt will have hip strength greater than 90% of contralateral lower extremity in on order to return to previous level of function  4. Pt will have full hip range of motion compared to contralateral lower extremity in order to return to previous level of function.        Plan     Continue with plan of care as indicated      Jennifer Armin, PTA

## 2023-10-09 ENCOUNTER — CLINICAL SUPPORT (OUTPATIENT)
Dept: REHABILITATION | Facility: HOSPITAL | Age: 23
End: 2023-10-09
Payer: OTHER GOVERNMENT

## 2023-10-09 DIAGNOSIS — R26.9 ABNORMAL GAIT: Primary | ICD-10-CM

## 2023-10-09 DIAGNOSIS — M25.60 DECREASED RANGE OF MOTION: ICD-10-CM

## 2023-10-09 DIAGNOSIS — R53.1 DECREASED STRENGTH: ICD-10-CM

## 2023-10-09 PROCEDURE — 97116 GAIT TRAINING THERAPY: CPT | Mod: PN

## 2023-10-09 PROCEDURE — 97140 MANUAL THERAPY 1/> REGIONS: CPT | Mod: PN

## 2023-10-09 PROCEDURE — 97110 THERAPEUTIC EXERCISES: CPT | Mod: PN

## 2023-10-09 PROCEDURE — 97530 THERAPEUTIC ACTIVITIES: CPT | Mod: PN

## 2023-10-09 NOTE — PROGRESS NOTES
[facility]   Physical Therapy Treatment Note      Name: Ewa Welch  [facility] Number: [id number]    Therapy Diagnosis:   Encounter Diagnoses   Name Primary?    Abnormal gait Yes    Decreased range of motion     Decreased strength      Physician: Milton Hogan*    Visit Date: 10/9/2023      Therapy Diagnosis:        Encounter Diagnoses   Name Primary?    Tear of right acetabular labrum, initial encounter      Femoroacetabular impingement of right hip      Abnormal gait      Decreased range of motion      Decreased strength          Physician: Milton Hogan*     Physician Orders: PT Eval and Treat   Medical Diagnosis from Referral: S73.191A (ICD-10-CM) - Tear of right acetabular labrum, initial encounter M25.851 (ICD-10-CM) - Femoroacetabular impingement of right hip  Evaluation Date: 9/18/2023  Authorization Period Expiration: 12/31/2023  Plan of Care Expiration: 1/16/2024  Progress Note Due: 10/18/2023  Visit # / Visits authorized: 6/20 + eval  FOTO: 5/5     Time In: 2:01pm  Time Out: 3:00pm  Total Appointment Time (timed & untimed codes): 58 minutes     Precautions: Standard   DOC 9/15/2023  PROCEDURE PERFORMED:   Right arthroscopic acetabular labral repair  Right arthroscopic osteoplasty of cam lesion  Right arthroscopic capsular plication     She will follow the hip arthroscopy with acetabular labral repair and osteoplasty protocol.  Return to clinic in 2 weeks.  Subjective     Patient reports: Went to a festival and may have overdone it with the crutches. Otherwise still feeling the pull in the front but always feels good when the mobilization is done.   She was compliant with home exercise program.  Response to previous treatment: felt great after riding the bike   Functional change: NA     Pain: 2/10  Location: right hip      Objective      Objective Measures updated at progress report unless specified.     Palpation: No pubic symphysis pain, TTP ant  "thigh  Observation: Visible bruising along ant thigh. No signs of infection    Treatment     Ewa received the treatments listed below:      Ewa received the following manual therapy techniques: Joint mobilizations were applied to the: right hip for 13 minutes, including:  Gentle circumduction mobilization  Gentle long axis distraction    Lateral hip distraction - great pain relief     Ewa participated in neuromuscular re-education activities to improve: Coordination, Sense, Proprioception, and Posture for 10 minutes. The following activities were included:    Glute bridge [id number]  Hooklying hip add c blue ball [id number]    Not today:  Hamstring isometrics [id number]   Quad sets 2x20x5"    Ewa received therapeutic exercises to develop strength, endurance, ROM, flexibility, and posture for 10 minutes including:  DKTC w/ towel 10x 10sec hold   Hand heel rocking gentle not through pan x10  Seated hamstring gastroc mobilization 20x5"  Prone Hip Ext 2x10  Prone HS Curls 3x10    Ewa participated in dynamic functional therapeutic activities to improve functional performance for 15 minutes, including:  Recumbent Bike 15min lv 2   Standing double limb 50% tolerance    Ewa participated in gait training to improve functional mobility and safety for 10  minutes, including:  Ambulation increased to single axillary crutch. Proper gait cued throughout. Pt can ambulate with no pain but does continue to demonstrate abnormal mechanics.      Home Exercises and Patient Education Provided     Education provided:   - Pt educated on POC  - Pt educated on HEP  - Pt educated on anatomy and physiology of current condition as it relates to signs and symptoms     Written Home Exercises Provided: yes.  Exercises were reviewed and Ewa was able to demonstrate them prior to the end of the session.  Ewa demonstrated good  understanding of the education provided.      See EMR under Patient Instructions for exercises provided " 9/18/2023.     Assessment    Ewa presented to PT with continued hip discomfort. Manual lateral distraction beneficial and allowed pt to perform exercises with better form and less discomfort. She was taught how to ambulate with single axillary crutch. Gait mechanics slightly abnormal but she did not experience any pain. PT to continue to progress towards independent weight bearing.      Plan of care discussed with patient: Yes  Patient's spiritual, cultural and educational needs considered and patient is agreeable to the plan of care and goals as stated below:      Anticipated Barriers for therapy: none      Goals:   Short Term Goals (8 Weeks):  1. Pt will be compliant with initial HEP to supplement PT in restoring pain free function.  2. Pt will reduce worst pain to 4/10 in order to progress functional deficits  3. Pt will ambulate 1mile without pain in order to return to previous level of function  4. Pt will improve hip flex strength to 4/5 in order to improve functional mobility      Long Term Goals (16 Weeks):  1. Pt will improve FOTO score to </= 73% limited to decrease perceived limitation with mobility.   2. Pt will reduce worst pain to 1/10 in order to progress functional deficits  3. Pt will have hip strength greater than 90% of contralateral lower extremity in on order to return to previous level of function  4. Pt will have full hip range of motion compared to contralateral lower extremity in order to return to previous level of function.        Plan     Continue with plan of care as indicated      Audrey Joseph, PT

## 2023-10-12 ENCOUNTER — CLINICAL SUPPORT (OUTPATIENT)
Dept: REHABILITATION | Facility: HOSPITAL | Age: 23
End: 2023-10-12
Payer: OTHER GOVERNMENT

## 2023-10-12 DIAGNOSIS — R26.9 ABNORMAL GAIT: Primary | ICD-10-CM

## 2023-10-12 DIAGNOSIS — R53.1 DECREASED STRENGTH: ICD-10-CM

## 2023-10-12 DIAGNOSIS — M25.60 DECREASED RANGE OF MOTION: ICD-10-CM

## 2023-10-12 PROCEDURE — 97140 MANUAL THERAPY 1/> REGIONS: CPT | Mod: PN,CQ

## 2023-10-12 PROCEDURE — 97112 NEUROMUSCULAR REEDUCATION: CPT | Mod: PN,CQ

## 2023-10-12 NOTE — PROGRESS NOTES
[facility]   Physical Therapy Treatment Note      Name: Ewa Welch  [facility] Number: [id number]    Therapy Diagnosis:   Encounter Diagnoses   Name Primary?    Abnormal gait Yes    Decreased range of motion     Decreased strength        Physician: Milton Hogan*    Visit Date: 10/12/2023      Therapy Diagnosis:        Encounter Diagnoses   Name Primary?    Tear of right acetabular labrum, initial encounter      Femoroacetabular impingement of right hip      Abnormal gait      Decreased range of motion      Decreased strength          Physician: Milton Hogan*     Physician Orders: PT Eval and Treat   Medical Diagnosis from Referral: S73.191A (ICD-10-CM) - Tear of right acetabular labrum, initial encounter M25.851 (ICD-10-CM) - Femoroacetabular impingement of right hip  Evaluation Date: 9/18/2023  Authorization Period Expiration: 12/31/2023  Plan of Care Expiration: 1/16/2024  Progress Note Due: 10/18/2023  Visit # / Visits authorized:7/20 + eval  FOTO: 5/5     Time In: 2:00  Time Out: 3:45pm  Total Appointment Time (timed & untimed codes): 27 minutes (1MT, 1NMR)     Precautions: Standard   DOC 9/15/2023  PROCEDURE PERFORMED:   Right arthroscopic acetabular labral repair  Right arthroscopic osteoplasty of cam lesion  Right arthroscopic capsular plication     She will follow the hip arthroscopy with acetabular labral repair and osteoplasty protocol.  Return to clinic in 2 weeks.  Subjective     Patient reports: She was in a rush today and left without her brace. She's so so so sorry.   She knows the brace is important and it'll never happen again.     She was compliant with home exercise program.  Response to previous treatment: felt great after riding the bike   Functional change: NA     Pain: 2/10  Location: right hip      Objective      Objective Measures updated at progress report unless specified.     Palpation: No pubic symphysis pain, TTP ant thigh  Observation:  "Visible bruising along ant thigh. No signs of infection    Treatment     Ewa received the treatments listed below:      Ewa received the following manual therapy techniques: Joint mobilizations were applied to the: right hip for 15 minutes, including:  Gentle circumduction mobilization  Gentle long axis distraction    Lateral hip distraction - great pain relief     Ewa participated in neuromuscular re-education activities to improve: Coordination, Sense, Proprioception, and Posture for 12 minutes. The following activities were included:    Glute bridge [id number]  Hooklying hip add c blue ball [id number]    Not today:  Hamstring isometrics [id number]   Quad sets 2x20x5"    Ewa received therapeutic exercises to develop strength, endurance, ROM, flexibility, and posture for 10 minutes including:  DKTC w/ towel 10x 10sec hold   Hand heel rocking gentle not through pan x10  Seated hamstring gastroc mobilization 20x5"  Prone Hip Ext 2x10  Prone HS Curls 3x10    Ewa participated in dynamic functional therapeutic activities to improve functional performance for 00 minutes, including:  Recumbent Bike 15min lv 2   Standing double limb 50% tolerance    Ewa participated in gait training to improve functional mobility and safety for 00 minutes, including:  Ambulation increased to single axillary crutch. Proper gait cued throughout. Pt can ambulate with no pain but does continue to demonstrate abnormal mechanics.      Home Exercises and Patient Education Provided     Education provided:   - Pt educated on POC  - Pt educated on HEP  - Pt educated on anatomy and physiology of current condition as it relates to signs and symptoms     Written Home Exercises Provided: yes.  Exercises were reviewed and Ewa was able to demonstrate them prior to the end of the session.  Ewa demonstrated good  understanding of the education provided.      See EMR under Patient Instructions for exercises provided 9/18/2023.   "   Assessment    Ewa presented to PT ambulating with one axillary crutch and without brace donned.  Advised Ewa of importance of brace to avoid injury to surgical site and to maintain ROM precautions to which she verbalized good understanding.  Today's session was limited, secondary to performing without brace.   Advised Ewa to avoid performing exercises with the same effort and intensity as in previous sessions, and to report any increased left hip pain or discomfort.   Ewa was able to complete without reports of increased left hip pain or discomfort.  Will continue to improve tolerance to weightbearing and RLE strength as tolerated.       Plan of care discussed with patient: Yes  Patient's spiritual, cultural and educational needs considered and patient is agreeable to the plan of care and goals as stated below:      Anticipated Barriers for therapy: none      Goals:   Short Term Goals (8 Weeks):  1. Pt will be compliant with initial HEP to supplement PT in restoring pain free function.  2. Pt will reduce worst pain to 4/10 in order to progress functional deficits  3. Pt will ambulate 1mile without pain in order to return to previous level of function  4. Pt will improve hip flex strength to 4/5 in order to improve functional mobility      Long Term Goals (16 Weeks):  1. Pt will improve FOTO score to </= 73% limited to decrease perceived limitation with mobility.   2. Pt will reduce worst pain to 1/10 in order to progress functional deficits  3. Pt will have hip strength greater than 90% of contralateral lower extremity in on order to return to previous level of function  4. Pt will have full hip range of motion compared to contralateral lower extremity in order to return to previous level of function.        Plan     Continue with plan of care as indicated      Jennifer YESENIA Funez

## 2023-10-16 ENCOUNTER — CLINICAL SUPPORT (OUTPATIENT)
Dept: REHABILITATION | Facility: HOSPITAL | Age: 23
End: 2023-10-16
Payer: OTHER GOVERNMENT

## 2023-10-16 DIAGNOSIS — R26.9 ABNORMAL GAIT: Primary | ICD-10-CM

## 2023-10-16 DIAGNOSIS — M25.60 DECREASED RANGE OF MOTION: ICD-10-CM

## 2023-10-16 DIAGNOSIS — R53.1 DECREASED STRENGTH: ICD-10-CM

## 2023-10-16 PROCEDURE — 97110 THERAPEUTIC EXERCISES: CPT | Mod: PN,CQ

## 2023-10-16 PROCEDURE — 97112 NEUROMUSCULAR REEDUCATION: CPT | Mod: PN,CQ

## 2023-10-16 NOTE — PROGRESS NOTES
[facility]   Physical Therapy Treatment Note      Name: Ewa Welch  [facility] Number: [id number]    Therapy Diagnosis:   Encounter Diagnoses   Name Primary?    Abnormal gait Yes    Decreased range of motion     Decreased strength          Physician: Milton Hogan*    Visit Date: 10/16/2023      Therapy Diagnosis:        Encounter Diagnoses   Name Primary?    Tear of right acetabular labrum, initial encounter      Femoroacetabular impingement of right hip      Abnormal gait      Decreased range of motion      Decreased strength          Physician: Milton Hogan*     Physician Orders: PT Eval and Treat   Medical Diagnosis from Referral: S73.191A (ICD-10-CM) - Tear of right acetabular labrum, initial encounter M25.851 (ICD-10-CM) - Femoroacetabular impingement of right hip  Evaluation Date: 9/18/2023  Authorization Period Expiration: 12/31/2023  Plan of Care Expiration: 1/16/2024  Progress Note Due: 10/18/2023  Visit # / Visits authorized:7/20 + eval  FOTO: 5/5     Time In: 2:00 pm  Time Out: 3:00 pm  Total Appointment Time (timed & untimed codes): 27 minutes (1MT, 1NMR)     Precautions: Standard   DOC 9/15/2023  PROCEDURE PERFORMED:   Right arthroscopic acetabular labral repair  Right arthroscopic osteoplasty of cam lesion  Right arthroscopic capsular plication     She will follow the hip arthroscopy with acetabular labral repair and osteoplasty protocol.  Return to clinic in 2 weeks.  Subjective     Patient reports: She started back at work today and it wasn't great.  She couldn't get comfortable in her seat while wearing the brace, and it was so frustrating.    She was compliant with home exercise program.  Response to previous treatment: felt great after riding the bike   Functional change: NA     Pain: 2/10  Location: right hip      Objective      Objective Measures updated at progress report unless specified.     Palpation: No pubic symphysis pain, TTP ant  "thigh  Observation: Visible bruising along ant thigh. No signs of infection    Treatment     Ewa received the treatments listed below:      Ewa received the following manual therapy techniques: Joint mobilizations were applied to the: right hip for 15 minutes, including:  Gentle circumduction mobilization  Gentle long axis distraction    Lateral hip distraction - great pain relief     Ewa participated in neuromuscular re-education activities to improve: Coordination, Sense, Proprioception, and Posture for 12 minutes. The following activities were included:    Glute bridge [id number]  Hooklying hip add c blue ball [id number]    Not today:  Hamstring isometrics [id number]   Quad sets 2x20x5"    Ewa received therapeutic exercises to develop strength, endurance, ROM, flexibility, and posture for 10 minutes including:  DKTC w/ towel 10x 10sec hold   Hand heel rocking gentle not through pan x10  Prone Hip Ext 2x10  Prone HS Curls 3x10 YTB    Ewa participated in dynamic functional therapeutic activities to improve functional performance for 00 minutes, including:  Recumbent Bike 15min lv 2   Standing double limb 50% tolerance    Ewa participated in gait training to improve functional mobility and safety for 00 minutes, including:  Ambulation increased to single axillary crutch. Proper gait cued throughout. Pt can ambulate with no pain but does continue to demonstrate abnormal mechanics.      Home Exercises and Patient Education Provided     Education provided:   - Pt educated on POC  - Pt educated on HEP  - Pt educated on anatomy and physiology of current condition as it relates to signs and symptoms     Written Home Exercises Provided: yes.  Exercises were reviewed and Ewa was able to demonstrate them prior to the end of the session.  Ewa demonstrated good  understanding of the education provided.      See EMR under Patient Instructions for exercises provided 9/18/2023.     Assessment    Ewa " presented to PT ambulating with one axillary crutch, with brace donned, and reports of anterior hip discomfort which decreased following manual therapy.  Ewa is currently 4 weeks and 3 days post op, and will begin slowly weaning off of the brace per post op protocol.  Advised Ewa to attempt sleeping without the brace, to which she verbalized good understanding.  Introduced exercises for improved glute and hamstring strength, which she performed without the brace. Ewa was able to complete today's session without reports of increased left hip pain and with minimal discomfort.     Plan of care discussed with patient: Yes  Patient's spiritual, cultural and educational needs considered and patient is agreeable to the plan of care and goals as stated below:      Anticipated Barriers for therapy: none      Goals:   Short Term Goals (8 Weeks):  1. Pt will be compliant with initial HEP to supplement PT in restoring pain free function.  2. Pt will reduce worst pain to 4/10 in order to progress functional deficits  3. Pt will ambulate 1mile without pain in order to return to previous level of function  4. Pt will improve hip flex strength to 4/5 in order to improve functional mobility      Long Term Goals (16 Weeks):  1. Pt will improve FOTO score to </= 73% limited to decrease perceived limitation with mobility.   2. Pt will reduce worst pain to 1/10 in order to progress functional deficits  3. Pt will have hip strength greater than 90% of contralateral lower extremity in on order to return to previous level of function  4. Pt will have full hip range of motion compared to contralateral lower extremity in order to return to previous level of function.        Plan     Continue with plan of care as indicated      Jennifer YESENIA Funez

## 2023-10-18 ENCOUNTER — CLINICAL SUPPORT (OUTPATIENT)
Dept: REHABILITATION | Facility: HOSPITAL | Age: 23
End: 2023-10-18
Payer: OTHER GOVERNMENT

## 2023-10-18 DIAGNOSIS — R26.9 ABNORMAL GAIT: Primary | ICD-10-CM

## 2023-10-18 DIAGNOSIS — M25.60 DECREASED RANGE OF MOTION: ICD-10-CM

## 2023-10-18 DIAGNOSIS — R53.1 DECREASED STRENGTH: ICD-10-CM

## 2023-10-18 PROCEDURE — 97140 MANUAL THERAPY 1/> REGIONS: CPT | Mod: PN

## 2023-10-18 PROCEDURE — 97530 THERAPEUTIC ACTIVITIES: CPT | Mod: PN

## 2023-10-18 PROCEDURE — 97110 THERAPEUTIC EXERCISES: CPT | Mod: PN

## 2023-10-18 PROCEDURE — 97112 NEUROMUSCULAR REEDUCATION: CPT | Mod: PN

## 2023-10-18 NOTE — PROGRESS NOTES
[facility]   Physical Therapy Treatment Note      Name: Ewa Welch  [facility] Number: [id number]    Therapy Diagnosis:   Encounter Diagnoses   Name Primary?    Abnormal gait Yes    Decreased range of motion     Decreased strength        Physician: Milton Hogan*    Visit Date: 10/18/2023      Therapy Diagnosis:        Encounter Diagnoses   Name Primary?    Tear of right acetabular labrum, initial encounter      Femoroacetabular impingement of right hip      Abnormal gait      Decreased range of motion      Decreased strength          Physician: Milton Hogan*     Physician Orders: PT Eval and Treat   Medical Diagnosis from Referral: S73.191A (ICD-10-CM) - Tear of right acetabular labrum, initial encounter M25.851 (ICD-10-CM) - Femoroacetabular impingement of right hip  Evaluation Date: 9/18/2023  Authorization Period Expiration: 12/31/2023  Plan of Care Expiration: 1/16/2024  Progress Note Due: 11/18/2023  Visit # / Visits authorized:9/20 + eval  FOTO: 5/5     Time In: 4:01pm  Time Out: 5:00 pm  Total Appointment Time (timed & untimed codes): 55 minutes (1MT, 1NMR)     Precautions: Standard   DOC 9/15/2023  PROCEDURE PERFORMED:   Right arthroscopic acetabular labral repair  Right arthroscopic osteoplasty of cam lesion  Right arthroscopic capsular plication     She will follow the hip arthroscopy with acetabular labral repair and osteoplasty protocol.  Return to clinic in 2 weeks.  Subjective     Patient reports: Feeling a lot better now that she can sleep without the brace. Still feels like when she moves suddenly she has ant hip pinching.   She was compliant with home exercise program.  Response to previous treatment: felt great after riding the bike   Functional change: NA     Pain: 2/10  Location: right hip      Objective      Objective Measures updated at progress report unless specified.       Treatment     Ewa received the treatments listed below:      Ewa  "received the following manual therapy techniques: Joint mobilizations were applied to the: right hip for 15 minutes, including:  Gentle circumduction mobilization  Gentle long axis distraction    Lateral hip distraction - great pain relief     Ewa participated in neuromuscular re-education activities to improve: Coordination, Sense, Proprioception, and Posture for 12 minutes. The following activities were included:    Glute bridge [id number] green theraband thighs  Tripod hip ext 3x10 R    Not today:  Hamstring isometrics [id number]   Quad sets 2x20x5"  Hooklying hip add c blue ball [id number]    Ewa received therapeutic exercises to develop strength, endurance, ROM, flexibility, and posture for 10 minutes including:    Hand heel rocking gentle not through pan x10  Prone HS Curls 3x10 YTB      Not today:  DKTC w/ towel 10x 10sec hold   Prone Hip Ext 2x10    Ewa participated in dynamic functional therapeutic activities to improve functional performance for 20 minutes, including:  Recumbent Bike 20min lv 2     Standing double limb 50% tolerance    Ewa participated in gait training to improve functional mobility and safety for 00 minutes, including:  Ambulation increased to single axillary crutch. Proper gait cued throughout. Pt can ambulate with no pain but does continue to demonstrate abnormal mechanics.      Home Exercises and Patient Education Provided     Education provided:   - Pt educated on POC  - Pt educated on HEP  - Pt educated on anatomy and physiology of current condition as it relates to signs and symptoms     Written Home Exercises Provided: yes.  Exercises were reviewed and Ewa was able to demonstrate them prior to the end of the session.  Ewa demonstrated good  understanding of the education provided.      See EMR under Patient Instructions for exercises provided 9/18/2023.     Assessment    Ewa presented to PT ambulating with one axillary crutch and brace donned. Brace was removed " for most exercises today. Pt educated on wean from crutch over the next week to improve ambulation. She can WBAT. She was progressed to more hip extension motor control training today and demonstrated early fatigue. PT to continue to progress strength as restrictions diminish.     Plan of care discussed with patient: Yes  Patient's spiritual, cultural and educational needs considered and patient is agreeable to the plan of care and goals as stated below:      Anticipated Barriers for therapy: none      Goals:   Short Term Goals (8 Weeks):  1. Pt will be compliant with initial HEP to supplement PT in restoring pain free function.  2. Pt will reduce worst pain to 4/10 in order to progress functional deficits  3. Pt will ambulate 1mile without pain in order to return to previous level of function  4. Pt will improve hip flex strength to 4/5 in order to improve functional mobility      Long Term Goals (16 Weeks):  1. Pt will improve FOTO score to </= 73% limited to decrease perceived limitation with mobility.   2. Pt will reduce worst pain to 1/10 in order to progress functional deficits  3. Pt will have hip strength greater than 90% of contralateral lower extremity in on order to return to previous level of function  4. Pt will have full hip range of motion compared to contralateral lower extremity in order to return to previous level of function.        Plan     Continue with plan of care as indicated      Audrey Joseph, PT

## 2023-10-23 ENCOUNTER — CLINICAL SUPPORT (OUTPATIENT)
Dept: REHABILITATION | Facility: HOSPITAL | Age: 23
End: 2023-10-23
Payer: OTHER GOVERNMENT

## 2023-10-23 DIAGNOSIS — R53.1 DECREASED STRENGTH: ICD-10-CM

## 2023-10-23 DIAGNOSIS — R26.9 ABNORMAL GAIT: Primary | ICD-10-CM

## 2023-10-23 DIAGNOSIS — M25.60 DECREASED RANGE OF MOTION: ICD-10-CM

## 2023-10-23 PROCEDURE — 97140 MANUAL THERAPY 1/> REGIONS: CPT | Mod: PN

## 2023-10-23 PROCEDURE — 97112 NEUROMUSCULAR REEDUCATION: CPT | Mod: PN

## 2023-10-23 PROCEDURE — 97530 THERAPEUTIC ACTIVITIES: CPT | Mod: PN

## 2023-10-23 PROCEDURE — 97110 THERAPEUTIC EXERCISES: CPT | Mod: PN

## 2023-10-23 NOTE — PROGRESS NOTES
[facility]   Physical Therapy Treatment Note      Name: Ewa Welch  [facility] Number: [id number]    Therapy Diagnosis:   Encounter Diagnoses   Name Primary?    Abnormal gait Yes    Decreased range of motion     Decreased strength      Physician: Milton Hogan*    Visit Date: 10/23/2023      Therapy Diagnosis:        Encounter Diagnoses   Name Primary?    Tear of right acetabular labrum, initial encounter      Femoroacetabular impingement of right hip      Abnormal gait      Decreased range of motion      Decreased strength          Physician: Milton Hogan*     Physician Orders: PT Eval and Treat   Medical Diagnosis from Referral: S73.191A (ICD-10-CM) - Tear of right acetabular labrum, initial encounter M25.851 (ICD-10-CM) - Femoroacetabular impingement of right hip  Evaluation Date: 9/18/2023  Authorization Period Expiration: 12/31/2023  Plan of Care Expiration: 1/16/2024  Progress Note Due: 11/18/2023  Visit # / Visits authorized: 10/20 + eval  FOTO: 5/5     Time In: 4:01pm  Time Out: 5:00 pm  Total Appointment Time (timed & untimed codes): 55 minutes (1MT, 1NMR)     Precautions: Standard   DOC 9/15/2023  PROCEDURE PERFORMED:   Right arthroscopic acetabular labral repair  Right arthroscopic osteoplasty of cam lesion  Right arthroscopic capsular plication     She will follow the hip arthroscopy with acetabular labral repair and osteoplasty protocol.  Return to clinic in 2 weeks.  Subjective     Patient reports: Is sleeping better now that she doesn't have to wear the brace.   She was compliant with home exercise program.  Response to previous treatment: felt great after riding the bike   Functional change: NA     Pain: 0/10  Location: right hip - only have discomfort if she moves a certain way    Objective      Objective Measures updated at progress report unless specified.       Treatment     Ewa received the treatments listed below:      Ewa received the following  "manual therapy techniques: Joint mobilizations were applied to the: right hip for 10 minutes, including:  Gentle circumduction mobilization  Gentle long axis distraction    Lateral hip distraction - great pain relief     Ewa participated in neuromuscular re-education activities to improve: Coordination, Sense, Proprioception, and Posture for 12 minutes. The following activities were included:    Glute bridge [id number] green theraband thighs  Tripod hip ext 3x10 R    Not today:  Hamstring isometrics [id number]   Quad sets 2x20x5"  Hooklying hip add c blue ball [id number]    Ewa received therapeutic exercises to develop strength, endurance, ROM, flexibility, and posture for 20 minutes including:    Prone HS Curls 3x10 yellow theraband  Standing hip Abd c internal rotation 3x10  Standing hip ext 3x10  Standing internal rotation/external rotation on stool 2x10      Not today:  Hand heel rocking gentle not through pan x10  DKTC w/ towel 10x 10sec hold   Prone Hip Ext 2x10    Ewa participated in dynamic functional therapeutic activities to improve functional performance for 15 minutes, including:  Recumbent Bike 15min lv 2     Standing double limb 50% tolerance    Ewa participated in gait training to improve functional mobility and safety for 00 minutes, including:  Ambulation increased to single axillary crutch. Proper gait cued throughout. Pt can ambulate with no pain but does continue to demonstrate abnormal mechanics.      Home Exercises and Patient Education Provided     Education provided:   - Pt educated on POC  - Pt educated on HEP  - Pt educated on anatomy and physiology of current condition as it relates to signs and symptoms     Written Home Exercises Provided: yes.  Exercises were reviewed and Ewa was able to demonstrate them prior to the end of the session.  Ewa demonstrated good  understanding of the education provided.      See EMR under Patient Instructions for exercises provided " 9/18/2023.     Assessment    Ewa presented to PT ambulating with on axillary crutch periodically. She is able to ambulate without but gets nervous. She was progressed to more standing activities today to include hip abduction with internal rotation as well and activate internal rotation/external rotation without any pain. She is to continue to be progressed per protocol.     Plan of care discussed with patient: Continue with previous HEP  Patient's spiritual, cultural and educational needs considered and patient is agreeable to the plan of care and goals as stated below:      Anticipated Barriers for therapy: none    Goals:   Short Term Goals (8 Weeks):  1. Pt will be compliant with initial HEP to supplement PT in restoring pain free function.  2. Pt will reduce worst pain to 4/10 in order to progress functional deficits  3. Pt will ambulate 1mile without pain in order to return to previous level of function  4. Pt will improve hip flex strength to 4/5 in order to improve functional mobility      Long Term Goals (16 Weeks):  1. Pt will improve FOTO score to </= 73% limited to decrease perceived limitation with mobility.   2. Pt will reduce worst pain to 1/10 in order to progress functional deficits  3. Pt will have hip strength greater than 90% of contralateral lower extremity in on order to return to previous level of function  4. Pt will have full hip range of motion compared to contralateral lower extremity in order to return to previous level of function.        Plan     Continue with plan of care as indicated      Audrey Joseph, PT

## 2023-10-26 ENCOUNTER — OFFICE VISIT (OUTPATIENT)
Dept: SPORTS MEDICINE | Facility: CLINIC | Age: 23
End: 2023-10-26
Payer: OTHER GOVERNMENT

## 2023-10-26 VITALS
BODY MASS INDEX: 21.25 KG/M2 | WEIGHT: 132.25 LBS | SYSTOLIC BLOOD PRESSURE: 110 MMHG | HEART RATE: 63 BPM | DIASTOLIC BLOOD PRESSURE: 66 MMHG | HEIGHT: 66 IN

## 2023-10-26 DIAGNOSIS — M25.851 FEMOROACETABULAR IMPINGEMENT OF RIGHT HIP: ICD-10-CM

## 2023-10-26 DIAGNOSIS — Z98.890 S/P HIP ARTHROSCOPY: Primary | ICD-10-CM

## 2023-10-26 DIAGNOSIS — S73.191A TEAR OF RIGHT ACETABULAR LABRUM, INITIAL ENCOUNTER: ICD-10-CM

## 2023-10-26 PROCEDURE — 99024 POSTOP FOLLOW-UP VISIT: CPT | Mod: ,,, | Performed by: STUDENT IN AN ORGANIZED HEALTH CARE EDUCATION/TRAINING PROGRAM

## 2023-10-26 PROCEDURE — 99214 OFFICE O/P EST MOD 30 MIN: CPT | Mod: PBBFAC | Performed by: STUDENT IN AN ORGANIZED HEALTH CARE EDUCATION/TRAINING PROGRAM

## 2023-10-26 PROCEDURE — 99999 PR PBB SHADOW E&M-EST. PATIENT-LVL IV: CPT | Mod: PBBFAC,,, | Performed by: STUDENT IN AN ORGANIZED HEALTH CARE EDUCATION/TRAINING PROGRAM

## 2023-10-26 PROCEDURE — 99024 PR POST-OP FOLLOW-UP VISIT: ICD-10-PCS | Mod: ,,, | Performed by: STUDENT IN AN ORGANIZED HEALTH CARE EDUCATION/TRAINING PROGRAM

## 2023-10-26 PROCEDURE — 99999 PR PBB SHADOW E&M-EST. PATIENT-LVL IV: ICD-10-PCS | Mod: PBBFAC,,, | Performed by: STUDENT IN AN ORGANIZED HEALTH CARE EDUCATION/TRAINING PROGRAM

## 2023-10-26 NOTE — PROGRESS NOTES
Subjective:     Chief Complaint: Ewa Welch is a 23 y.o. female who had concerns including Pain of the Right Hip.    HPI    Patient is here s/p Right hip labral repair for 6 week post-op appointment. She reports 0/10 pain and is not taking anything for pain.  She is, however having worsening muscle spasms that wake her up at night.  She denies any nausea, vomiting, fever, chills, shortness of breath, or calf pain. She is attending formal physical therapy at [facility].  T scope hip brace in place.    PROCEDURE PERFORMED by Leonard Becker MD on 09/15/2023:   Right arthroscopic acetabular labral repair  Right arthroscopic osteoplasty of cam lesion  Right arthroscopic capsular plication    Review of Systems   Constitutional: Negative.   HENT: Negative.     Eyes: Negative.    Cardiovascular: Negative.    Respiratory: Negative.     Endocrine: Negative.    Hematologic/Lymphatic: Negative.    Skin: Negative.    Musculoskeletal:  Positive for muscle weakness. Negative for falls, joint pain and joint swelling.   Neurological: Negative.    Psychiatric/Behavioral: Negative.     Allergic/Immunologic: Negative.          Pain Related Questions  Over the past 3 days, what was your average pain during activity? (I.e. running, jogging, walking, climbing stairs, getting dressed, ect.): 2  Over the past 3 days, what was your highest pain level?: 2  Over the past 3 days, what was your lowest pain level? : 1    Other  Was the patient's HEIGHT measured or patient reported?: Patient Reported  Was the patient's WEIGHT measured or patient reported?: Measured    Objective:     General: Ewa is well-developed, well-nourished, appears stated age, in no acute distress, alert and oriented to time, place and person.     General    Nursing note and vitals reviewed.  Constitutional: She is oriented to person, place, and time. She appears well-developed and well-nourished. No distress.   HENT:   Head: Normocephalic and atraumatic.    Nose: Nose normal.   Eyes: EOM are normal.   Cardiovascular:  Intact distal pulses.            Pulmonary/Chest: Effort normal. No respiratory distress.   Neurological: She is alert and oriented to person, place, and time.   Psychiatric: She has a normal mood and affect. Her behavior is normal. Judgment and thought content normal.           Right Knee Exam     Inspection   Alignment:  normal  Effusion: absent    Right Hip Exam     Inspection   Scars: present  Swelling: absent  Bruising: absent  No deformity of hip.  Quadriceps Atrophy:  Negative  Erythema: absent    Tests   Pain w/ forced internal rotation (LIZETTE): absent  Pain w/ forced external rotation (FADIR): absent  Cynthia: negative  Stinchfield test: negative    Other   Sensation: normal    Comments:  Incision sites healing well, without signs of erythema, infection, or wound dehiscence.    Range of motion flexion 110/internal rotation 20      Vascular Exam     Right Pulses  Dorsalis Pedis:      2+  Posterior Tibial:      2+        Edema  Right Upper Leg: absent          Assessment:   Ewa Welch is a 23 y.o. female status post above and doing well.  Encounter Diagnoses   Name Primary?    S/P hip arthroscopy Yes    Tear of right acetabular labrum, initial encounter     Femoroacetabular impingement of right hip           Plan:     She is doing very well.  We will discontinue use of the brace.  We will change physical therapy locations at her request to [place].  Advance per protocol as tolerated.  Return to clinic in 6 weeks.      All of the patient's questions were answered. Patient was advised to call the clinic or contact me through the patient portal for any questions or concerns.         Patient questionnaires may have been collected.

## 2023-10-27 ENCOUNTER — CLINICAL SUPPORT (OUTPATIENT)
Dept: REHABILITATION | Facility: HOSPITAL | Age: 23
End: 2023-10-27
Payer: OTHER GOVERNMENT

## 2023-10-27 DIAGNOSIS — M25.60 DECREASED RANGE OF MOTION: ICD-10-CM

## 2023-10-27 DIAGNOSIS — R53.1 DECREASED STRENGTH: ICD-10-CM

## 2023-10-27 DIAGNOSIS — R26.9 ABNORMAL GAIT: Primary | ICD-10-CM

## 2023-10-27 PROCEDURE — 97112 NEUROMUSCULAR REEDUCATION: CPT | Mod: PN,CQ

## 2023-10-27 PROCEDURE — 97140 MANUAL THERAPY 1/> REGIONS: CPT | Mod: PN,CQ

## 2023-10-27 PROCEDURE — 97110 THERAPEUTIC EXERCISES: CPT | Mod: PN,CQ

## 2023-10-27 NOTE — PROGRESS NOTES
[facility]   Physical Therapy Treatment Note      Name: Ewa Welch  [facility] Number: [id number]    Therapy Diagnosis:   Encounter Diagnoses   Name Primary?    Abnormal gait Yes    Decreased range of motion     Decreased strength        Physician: Milton Hogan*    Visit Date: 10/27/2023      Therapy Diagnosis:        Encounter Diagnoses   Name Primary?    Tear of right acetabular labrum, initial encounter      Femoroacetabular impingement of right hip      Abnormal gait      Decreased range of motion      Decreased strength          Physician: Milton Hogan*     Physician Orders: PT Eval and Treat   Medical Diagnosis from Referral: S73.191A (ICD-10-CM) - Tear of right acetabular labrum, initial encounter M25.851 (ICD-10-CM) - Femoroacetabular impingement of right hip  Evaluation Date: 9/18/2023  Authorization Period Expiration: 12/31/2023  Plan of Care Expiration: 1/16/2024  Progress Note Due: 11/18/2023  Visit # / Visits authorized: 11/20 + eval  FOTO: 5/5     Time In: 3:00 pm  Time Out: 4:00 pm  Total Appointment Time (timed & untimed codes): 53 minutes (1MT, 1NMR, , 2TE)     Precautions: Standard   DOC 9/15/2023  PROCEDURE PERFORMED:   Right arthroscopic acetabular labral repair  Right arthroscopic osteoplasty of cam lesion  Right arthroscopic capsular plication     She will follow the hip arthroscopy with acetabular labral repair and osteoplasty protocol.  Return to clinic in 2 weeks.  Subjective     Patient reports: She can tell how much the brace was supporting her because she tell when she walks how weak she really is.  She's looking forward to continued strength gains in PT.   She was compliant with home exercise program.  Response to previous treatment: NA  Functional change: NA     Pain: 0/10  Location: right hip - only have discomfort if she moves a certain way    Objective      Objective Measures updated at progress report unless specified.       Treatment  "    Ewa received the treatments listed below:      Ewa received the following manual therapy techniques: Joint mobilizations were applied to the: right hip for 13 minutes, including:  Gentle circumduction mobilization  Gentle long axis distraction    Lateral hip distraction - great pain relief     Ewa participated in neuromuscular re-education activities to improve: Coordination, Sense, Proprioception, and Posture for 10minutes. The following activities were included:  Glute bridge [id number] green theraband thighs  Tripod hip ext 3x10 R    Not today:  Hamstring isometrics [id number]   Quad sets 2x20x5"  Hooklying hip add c blue ball [id number]    Ewa received therapeutic exercises to develop strength, endurance, ROM, flexibility, and posture for 30 minutes including:  Prone HS Curls 3x10 +red theraband  Standing hip Abd c internal rotation 3x10  Standing hip ext 3x10  Standing internal rotation/external rotation on stool 3x10  +DBL Shuttle Press 2 black bands 2x10      Not today:  Hand heel rocking gentle not through pan x10  DKTC w/ towel 10x 10sec hold   Prone Hip Ext 2x10    Ewa participated in dynamic functional therapeutic activities to improve functional performance for 15 minutes, including:  Recumbent Bike 15min lv 2     Standing double limb 50% tolerance    Ewa participated in gait training to improve functional mobility and safety for 00 minutes, including:  Ambulation increased to single axillary crutch. Proper gait cued throughout. Pt can ambulate with no pain but does continue to demonstrate abnormal mechanics.      Home Exercises and Patient Education Provided     Education provided:   - Pt educated on POC  - Pt educated on HEP  - Pt educated on anatomy and physiology of current condition as it relates to signs and symptoms     Written Home Exercises Provided: yes.  Exercises were reviewed and Ewa was able to demonstrate them prior to the end of the session.  Ewa demonstrated " good  understanding of the education provided.      See EMR under Patient Instructions for exercises provided 9/18/2023.     Assessment    Ewa presented to PT ambulating without AD, decreased RLE step length but with reports of improved confidence without AD.  Increased resistance to exercise for improved hamstring strength, as well as introduced Shuttle Press with low resistance for improved strength and right hip ROM.   Ewa was able to complete both progressions with reports of fatigue, but without increased right hip pain or discomfort. Will continue to progress per post op protocol.     Plan of care discussed with patient: Continue with previous HEP  Patient's spiritual, cultural and educational needs considered and patient is agreeable to the plan of care and goals as stated below:      Anticipated Barriers for therapy: none    Goals:   Short Term Goals (8 Weeks):  1. Pt will be compliant with initial HEP to supplement PT in restoring pain free function.  2. Pt will reduce worst pain to 4/10 in order to progress functional deficits  3. Pt will ambulate 1mile without pain in order to return to previous level of function  4. Pt will improve hip flex strength to 4/5 in order to improve functional mobility      Long Term Goals (16 Weeks):  1. Pt will improve FOTO score to </= 73% limited to decrease perceived limitation with mobility.   2. Pt will reduce worst pain to 1/10 in order to progress functional deficits  3. Pt will have hip strength greater than 90% of contralateral lower extremity in on order to return to previous level of function  4. Pt will have full hip range of motion compared to contralateral lower extremity in order to return to previous level of function.        Plan     Continue with plan of care as indicated      Jennifer Armin, PTA

## 2023-11-01 ENCOUNTER — CLINICAL SUPPORT (OUTPATIENT)
Dept: REHABILITATION | Facility: HOSPITAL | Age: 23
End: 2023-11-01
Payer: OTHER GOVERNMENT

## 2023-11-01 DIAGNOSIS — R53.1 DECREASED STRENGTH: ICD-10-CM

## 2023-11-01 DIAGNOSIS — R26.9 ABNORMAL GAIT: Primary | ICD-10-CM

## 2023-11-01 DIAGNOSIS — M25.60 DECREASED RANGE OF MOTION: ICD-10-CM

## 2023-11-01 PROCEDURE — 97530 THERAPEUTIC ACTIVITIES: CPT | Mod: PN

## 2023-11-01 PROCEDURE — 97110 THERAPEUTIC EXERCISES: CPT | Mod: PN

## 2023-11-01 PROCEDURE — 97112 NEUROMUSCULAR REEDUCATION: CPT | Mod: PN

## 2023-11-01 NOTE — PROGRESS NOTES
[facility]   Physical Therapy Treatment Note      Name: Ewa Welch  [facility] Number: [id number]    Therapy Diagnosis:   Encounter Diagnoses   Name Primary?    Abnormal gait Yes    Decreased range of motion     Decreased strength        Physician: Milton Hogan*    Visit Date: 11/1/2023      Therapy Diagnosis:        Encounter Diagnoses   Name Primary?    Tear of right acetabular labrum, initial encounter      Femoroacetabular impingement of right hip      Abnormal gait      Decreased range of motion      Decreased strength          Physician: Milton Hogan*     Physician Orders: PT Eval and Treat   Medical Diagnosis from Referral: S73.191A (ICD-10-CM) - Tear of right acetabular labrum, initial encounter M25.851 (ICD-10-CM) - Femoroacetabular impingement of right hip  Evaluation Date: 9/18/2023  Authorization Period Expiration: 12/31/2023  Plan of Care Expiration: 1/16/2024  Progress Note Due: 11/18/2023  Visit # / Visits authorized: 12/20 + eval  FOTO: 5/5     Time In: 3:00 pm  Time Out: 4:00 pm  Total Appointment Time (timed & untimed codes): 56 minutes (1MT, 1NMR, , 2TE)     Precautions: Standard   DOC 9/15/2023  PROCEDURE PERFORMED:   Right arthroscopic acetabular labral repair  Right arthroscopic osteoplasty of cam lesion  Right arthroscopic capsular plication     She will follow the hip arthroscopy with acetabular labral repair and osteoplasty protocol.  Return to clinic in 2 weeks.  Subjective     Patient reports: Saw don, was given a good report. Only feels it pull every so often. Feels like she is walking better without a limp.   She was compliant with home exercise program.  Response to previous treatment: NA  Functional change: NA     Pain: 0/10  Location: right hip - only have discomfort if she moves a certain way    Objective      Objective Measures updated at progress report unless specified.       Treatment     Ewa received the treatments listed below:   "    Ewa received the following manual therapy techniques: Joint mobilizations were applied to the: right hip for 5 minutes, including:  Gentle circumduction mobilization  Gentle long axis distraction    Lateral hip distraction - great pain relief     Ewa participated in neuromuscular re-education activities to improve: Coordination, Sense, Proprioception, and Posture for 15 minutes. The following activities were included:  Glute bridge [id number] green theraband thighs  Single leg balance [id number]   Single leg dynamic balance forward reaches [id number]   Clamshell red theraband 3x10 right   Tripod hip ext 3x10 R    Not today:  Hamstring isometrics [id number]   Quad sets 2x20x5"  Hooklying hip add c blue ball [id number]    Ewa received therapeutic exercises to develop strength, endurance, ROM, flexibility, and posture for 30 minutes including:  Prone HS Curls 3x10 red theraband  Single leg shuttle 37.5 # 2x15  Step up 12" step    Not today:  Standing internal rotation/external rotation on stool 3x10  DBL Shuttle Press 2 black bands 2x10  Standing hip Abd c internal rotation 3x10  Standing hip ext 3x10  Hand heel rocking gentle not through pan x10  DKTC w/ towel 10x 10sec hold   Prone Hip Ext 2x10    Ewa participated in dynamic functional therapeutic activities to improve functional performance for 10 minutes, including:  Recumbent Bike 10min lv 2     Standing double limb 50% tolerance    Ewa participated in gait training to improve functional mobility and safety for 00 minutes, including:  Ambulation increased to single axillary crutch. Proper gait cued throughout. Pt can ambulate with no pain but does continue to demonstrate abnormal mechanics.      Home Exercises and Patient Education Provided     Education provided:   - Pt educated on POC  - Pt educated on HEP  - Pt educated on anatomy and physiology of current condition as it relates to signs and symptoms     Written Home Exercises Provided: " yes.  Exercises were reviewed and Ewa was able to demonstrate them prior to the end of the session.  Ewa demonstrated good  understanding of the education provided.      See EMR under Patient Instructions for exercises provided 9/18/2023.     Assessment    Ewa presented to PT with no brace and no crutches. She is ambulating with almost normal gait mechanics. She continues to demonstrate strength deficits in glutes. Advancement today focused on both hip abduction and extension. Balance and proprioception also added to improve sense and joint awareness. PT to continue to progress.     Plan of care discussed with patient: Continue with previous HEP  Patient's spiritual, cultural and educational needs considered and patient is agreeable to the plan of care and goals as stated below:      Anticipated Barriers for therapy: none    Goals:   Short Term Goals (8 Weeks):  1. Pt will be compliant with initial HEP to supplement PT in restoring pain free function.  2. Pt will reduce worst pain to 4/10 in order to progress functional deficits  3. Pt will ambulate 1mile without pain in order to return to previous level of function  4. Pt will improve hip flex strength to 4/5 in order to improve functional mobility      Long Term Goals (16 Weeks):  1. Pt will improve FOTO score to </= 73% limited to decrease perceived limitation with mobility.   2. Pt will reduce worst pain to 1/10 in order to progress functional deficits  3. Pt will have hip strength greater than 90% of contralateral lower extremity in on order to return to previous level of function  4. Pt will have full hip range of motion compared to contralateral lower extremity in order to return to previous level of function.        Plan     Continue with plan of care as indicated      Audrey Joseph, PT

## 2023-11-06 ENCOUNTER — CLINICAL SUPPORT (OUTPATIENT)
Dept: REHABILITATION | Facility: HOSPITAL | Age: 23
End: 2023-11-06
Payer: OTHER GOVERNMENT

## 2023-11-06 DIAGNOSIS — M25.60 DECREASED RANGE OF MOTION: ICD-10-CM

## 2023-11-06 DIAGNOSIS — R26.9 ABNORMAL GAIT: Primary | ICD-10-CM

## 2023-11-06 DIAGNOSIS — R53.1 DECREASED STRENGTH: ICD-10-CM

## 2023-11-06 PROCEDURE — 97530 THERAPEUTIC ACTIVITIES: CPT | Mod: PN

## 2023-11-06 PROCEDURE — 97110 THERAPEUTIC EXERCISES: CPT | Mod: PN

## 2023-11-06 PROCEDURE — 97112 NEUROMUSCULAR REEDUCATION: CPT | Mod: PN

## 2023-11-06 NOTE — PROGRESS NOTES
[facility]   Physical Therapy Treatment Note      Name: Ewa Welch  Clinic Number: [id number]    Therapy Diagnosis:   Encounter Diagnoses   Name Primary?    Abnormal gait Yes    Decreased range of motion     Decreased strength          Physician: Milton Hogan*    Visit Date: 11/6/2023      Therapy Diagnosis:        Encounter Diagnoses   Name Primary?    Tear of right acetabular labrum, initial encounter      Femoroacetabular impingement of right hip      Abnormal gait      Decreased range of motion      Decreased strength          Physician: Milton Hogan*     Physician Orders: PT Eval and Treat   Medical Diagnosis from Referral: S73.191A (ICD-10-CM) - Tear of right acetabular labrum, initial encounter M25.851 (ICD-10-CM) - Femoroacetabular impingement of right hip  Evaluation Date: 9/18/2023  Authorization Period Expiration: 12/31/2023  Plan of Care Expiration: 1/16/2024  Progress Note Due: 11/18/2023  Visit # / Visits authorized: 13/20 + eval  FOTO: 5/5     Time In: 4:00 pm  Time Out: 4:56 pm  Total Appointment Time (timed & untimed codes): 56 minutes (1MT, 1NMR, , 2TE)     Precautions: Standard   DOC 9/15/2023  PROCEDURE PERFORMED:   Right arthroscopic acetabular labral repair  Right arthroscopic osteoplasty of cam lesion  Right arthroscopic capsular plication     She will follow the hip arthroscopy with acetabular labral repair and osteoplasty protocol.  Return to clinic in 2 weeks.  Subjective     Patient reports: Feeling good, has no complaints except getting tired easily when walking. She was sore after last PT session.    She was compliant with home exercise program.  Response to previous treatment: sore  Functional change: NA     Pain: 0/10  Location: right hip - only have discomfort if she moves a certain way    Objective      Objective Measures updated at progress report unless specified.       Treatment     Ewa received the treatments listed below:   "    Ewa received the following manual therapy techniques: Joint mobilizations were applied to the: right hip for 5 minutes, including:  Gentle circumduction mobilization  Gentle long axis distraction    Lateral hip distraction - great pain relief     Ewa participated in neuromuscular re-education activities to improve: Coordination, Sense, Proprioception, and Posture for 15 minutes. The following activities were included:  Glute bridge [id number] green theraband thighs +10#  Single leg balance [id number]   Single leg dynamic balance forward reaches [id number]   Clamshell red theraband 3x10 right   Tripod hip ext 3x10 R    Not today:  Hamstring isometrics [id number]   Quad sets 2x20x5"  Hooklying hip add c blue ball [id number]    Ewa received therapeutic exercises to develop strength, endurance, ROM, flexibility, and posture for 26 minutes including:  Prone HS Curls 3x10 red theraband  Single leg shuttle 37.5 # 2x15  Step up 12" step  +side step yellow theraband 1 lap    Not today:  Standing internal rotation/external rotation on stool 3x10  DBL Shuttle Press 2 black bands 2x10  Standing hip Abd c internal rotation 3x10  Standing hip ext 3x10  Hand heel rocking gentle not through pan x10  DKTC w/ towel 10x 10sec hold   Prone Hip Ext 2x10    Ewa participated in dynamic functional therapeutic activities to improve functional performance for 10 minutes, including:  Recumbent Bike 10min lv 2     Standing double limb 50% tolerance    Ewa participated in gait training to improve functional mobility and safety for 00 minutes, including:  Ambulation increased to single axillary crutch. Proper gait cued throughout. Pt can ambulate with no pain but does continue to demonstrate abnormal mechanics.      Home Exercises and Patient Education Provided     Education provided:   - Pt educated on POC  - Pt educated on HEP  - Pt educated on anatomy and physiology of current condition as it relates to signs and symptoms   "   Written Home Exercises Provided: yes.  Exercises were reviewed and Ewa was able to demonstrate them prior to the end of the session.  Ewa demonstrated good  understanding of the education provided.      See EMR under Patient Instructions for exercises provided 9/18/2023.     Assessment    Ewa presented to PT with reports of fatigue with walking but no pain in right hip. Continued to challenge hip extension and abduction today - resistance kept the same due to current level of fatigue with exercises. Balance was improved today with static and dynamic single leg stance. Ewa reported muscle burn and fatigue at the end of the session but no increase in pain. PT will continue to challenge hip strength in closed and open chain to increase endurance and strength in functional activities.     Plan of care discussed with patient: Continue with previous HEP  Patient's spiritual, cultural and educational needs considered and patient is agreeable to the plan of care and goals as stated below:      Anticipated Barriers for therapy: none    Goals:   Short Term Goals (8 Weeks):  1. Pt will be compliant with initial HEP to supplement PT in restoring pain free function.  2. Pt will reduce worst pain to 4/10 in order to progress functional deficits  3. Pt will ambulate 1mile without pain in order to return to previous level of function  4. Pt will improve hip flex strength to 4/5 in order to improve functional mobility      Long Term Goals (16 Weeks):  1. Pt will improve FOTO score to </= 73% limited to decrease perceived limitation with mobility.   2. Pt will reduce worst pain to 1/10 in order to progress functional deficits  3. Pt will have hip strength greater than 90% of contralateral lower extremity in on order to return to previous level of function  4. Pt will have full hip range of motion compared to contralateral lower extremity in order to return to previous level of function.        Plan     Continue with  plan of care as indicated    Ashlee Conklin, PT   Co-treated by Audrey Joseph, PT, DPT, OCS

## 2023-11-08 ENCOUNTER — CLINICAL SUPPORT (OUTPATIENT)
Dept: REHABILITATION | Facility: HOSPITAL | Age: 23
End: 2023-11-08
Payer: OTHER GOVERNMENT

## 2023-11-08 DIAGNOSIS — R53.1 DECREASED STRENGTH: ICD-10-CM

## 2023-11-08 DIAGNOSIS — M25.60 DECREASED RANGE OF MOTION: ICD-10-CM

## 2023-11-08 DIAGNOSIS — R26.9 ABNORMAL GAIT: Primary | ICD-10-CM

## 2023-11-08 PROCEDURE — 97110 THERAPEUTIC EXERCISES: CPT | Mod: PN

## 2023-11-08 PROCEDURE — 97112 NEUROMUSCULAR REEDUCATION: CPT | Mod: PN

## 2023-11-08 PROCEDURE — 97530 THERAPEUTIC ACTIVITIES: CPT | Mod: PN

## 2023-11-08 NOTE — PROGRESS NOTES
[facility]   Physical Therapy Treatment Note      Name: Ewa Welch  [facility] Number: [id number]    Therapy Diagnosis:   Encounter Diagnoses   Name Primary?    Abnormal gait Yes    Decreased range of motion     Decreased strength        Physician: Milton Hogan*    Visit Date: 11/8/2023      Therapy Diagnosis:        Encounter Diagnoses   Name Primary?    Tear of right acetabular labrum, initial encounter      Femoroacetabular impingement of right hip      Abnormal gait      Decreased range of motion      Decreased strength          Physician: Milton Hogan*     Physician Orders: PT Eval and Treat   Medical Diagnosis from Referral: S73.191A (ICD-10-CM) - Tear of right acetabular labrum, initial encounter M25.851 (ICD-10-CM) - Femoroacetabular impingement of right hip  Evaluation Date: 9/18/2023  Authorization Period Expiration: 12/31/2023  Plan of Care Expiration: 1/16/2024  Progress Note Due: 11/18/2023  Visit # / Visits authorized: 14/20 + eval  FOTO: 5/5     Time In: 4:00 pm  Time Out: 4:59 pm  Total Appointment Time (timed & untimed codes): 59 minutes (1MT, 1NMR, , 2TE)     Precautions: Standard   DOC 9/15/2023  PROCEDURE PERFORMED:   Right arthroscopic acetabular labral repair  Right arthroscopic osteoplasty of cam lesion  Right arthroscopic capsular plication     She will follow the hip arthroscopy with acetabular labral repair and osteoplasty protocol.  Return to clinic in 2 weeks.  Subjective     Patient reports: She was sore after last visit. But really happy about it. Wants to know what she can do if she goes to the gym.   She was compliant with home exercise program.  Response to previous treatment: sore  Functional change: NA     Pain: 0/10  Location: right hip - only have discomfort if she moves a certain way    Objective      Objective Measures updated at progress report unless specified.       Treatment     Ewa received the treatments listed below:   "    Ewa received the following manual therapy techniques: Joint mobilizations were applied to the: right hip for 0 minutes, including:  Gentle circumduction mobilization  Gentle long axis distraction    Lateral hip distraction - great pain relief     Ewa participated in neuromuscular re-education activities to improve: Coordination, Sense, Proprioception, and Posture for 25 minutes. The following activities were included:    Single leg bridge 3x8   Ham curl green ball 3x10 5"  Clamshell red theraband 3x10 right lv 2  Single leg balance [id number]   Single leg dynamic balance forward reaches 1x31enb     Not today:  Tripod hip ext 3x10 right  Glute bridge [id number] green theraband thighs +10#  Hamstring isometrics [id number]   Quad sets 2x20x5"  Hooklying hip add c blue ball [id number]    Ewa received therapeutic exercises to develop strength, endurance, ROM, flexibility, and posture for 17 minutes including:  Prone HS Curls 3x10 red theraband  Single leg shuttle 50# 2x15  Lateral Monster walk Yellow theraband 3 laps     Not today:  Step up 12" step  Standing internal rotation/external rotation on stool 3x10  DBL Shuttle Press 2 black bands 2x10  Standing hip Abd c internal rotation 3x10  Standing hip ext 3x10  Hand heel rocking gentle not through pan x10  DKTC w/ towel 10x 10sec hold   Prone Hip Ext 2x10    Ewa participated in dynamic functional therapeutic activities to improve functional performance for 15 minutes, including:  Recumbent Bike 10min lv 3  TRX single leg squat to bench double leg stand 3x10     Standing double limb 50% tolerance    Ewa participated in gait training to improve functional mobility and safety for 00 minutes, including:  Ambulation increased to single axillary crutch. Proper gait cued throughout. Pt can ambulate with no pain but does continue to demonstrate abnormal mechanics.      Home Exercises and Patient Education Provided     Education provided:   - Pt educated on POC  - " Pt educated on HEP  - Pt educated on anatomy and physiology of current condition as it relates to signs and symptoms     Written Home Exercises Provided: yes.  Exercises were reviewed and Ewa was able to demonstrate them prior to the end of the session.  Ewa demonstrated good  understanding of the education provided.      See EMR under Patient Instructions for exercises provided 9/18/2023.     Assessment    Ewa presented to PT with reports of DOMS. She was progressed today to more functional strengthening exercises in order to build both hip and quad strength. She had no pain during activity but did verbalize proper fatigue and difficulty. She does demonstrate genu valgus during single leg squat due to hip weakness. Motor control training of hip and quad will continue to be a focus of tx.     Plan of care discussed with patient: Continue with previous HEP  Patient's spiritual, cultural and educational needs considered and patient is agreeable to the plan of care and goals as stated below:      Anticipated Barriers for therapy: none    Goals:   Short Term Goals (8 Weeks):  1. Pt will be compliant with initial HEP to supplement PT in restoring pain free function.  2. Pt will reduce worst pain to 4/10 in order to progress functional deficits  3. Pt will ambulate 1mile without pain in order to return to previous level of function  4. Pt will improve hip flex strength to 4/5 in order to improve functional mobility      Long Term Goals (16 Weeks):  1. Pt will improve FOTO score to </= 73% limited to decrease perceived limitation with mobility.   2. Pt will reduce worst pain to 1/10 in order to progress functional deficits  3. Pt will have hip strength greater than 90% of contralateral lower extremity in on order to return to previous level of function  4. Pt will have full hip range of motion compared to contralateral lower extremity in order to return to previous level of function.        Plan     Continue with  plan of care as indicated    Audrey Joseph PT   Co-treated by Audrey Joseph PT, DPT, OCS

## 2023-11-13 ENCOUNTER — CLINICAL SUPPORT (OUTPATIENT)
Dept: REHABILITATION | Facility: HOSPITAL | Age: 23
End: 2023-11-13
Payer: OTHER GOVERNMENT

## 2023-11-13 DIAGNOSIS — R26.9 ABNORMAL GAIT: Primary | ICD-10-CM

## 2023-11-13 DIAGNOSIS — M25.60 DECREASED RANGE OF MOTION: ICD-10-CM

## 2023-11-13 DIAGNOSIS — R53.1 DECREASED STRENGTH: ICD-10-CM

## 2023-11-13 PROCEDURE — 97530 THERAPEUTIC ACTIVITIES: CPT | Mod: PN

## 2023-11-13 PROCEDURE — 97112 NEUROMUSCULAR REEDUCATION: CPT | Mod: PN

## 2023-11-13 NOTE — PROGRESS NOTES
[facility]   Physical Therapy Treatment Note      Name: Ewa Welch  Clinic Number: [id number]    Therapy Diagnosis:   Encounter Diagnoses   Name Primary?    Abnormal gait Yes    Decreased range of motion     Decreased strength          Physician: Miltno Hogan*    Visit Date: 11/13/2023      Therapy Diagnosis:        Encounter Diagnoses   Name Primary?    Tear of right acetabular labrum, initial encounter      Femoroacetabular impingement of right hip      Abnormal gait      Decreased range of motion      Decreased strength          Physician: Milton Hogan*     Physician Orders: PT Eval and Treat   Medical Diagnosis from Referral: S73.191A (ICD-10-CM) - Tear of right acetabular labrum, initial encounter M25.851 (ICD-10-CM) - Femoroacetabular impingement of right hip  Evaluation Date: 9/18/2023  Authorization Period Expiration: 12/31/2023  Plan of Care Expiration: 1/16/2024  Progress Note Due: 11/18/2023  Visit # / Visits authorized: 15/20 + eval  FOTO: 5/5     Time In: 4:00 pm  Time Out: 4:59 pm  Total Appointment Time (timed & untimed codes): 59 minutes (1MT, 1NMR, , 2TE)     Precautions: Standard   DOC 9/15/2023  PROCEDURE PERFORMED:   Right arthroscopic acetabular labral repair  Right arthroscopic osteoplasty of cam lesion  Right arthroscopic capsular plication     She will follow the hip arthroscopy with acetabular labral repair and osteoplasty protocol.  Return to clinic in 2 weeks.  Subjective     Patient reports: Doing good, felt a little sore after last visit.  She was compliant with home exercise program.  Response to previous treatment: sore  Functional change: NA     Pain: 0/10  Location: right hip - only have discomfort if she moves a certain way    Objective      Objective Measures updated at progress report unless specified.       Treatment     Ewa received the treatments listed below:      Ewa received the following manual therapy techniques: Joint  "mobilizations were applied to the: right hip for 0 minutes, including:  Gentle circumduction mobilization  Gentle long axis distraction    Lateral hip distraction - great pain relief     Ewa participated in neuromuscular re-education activities to improve: Coordination, Sense, Proprioception, and Posture for 25 minutes. The following activities were included:    Single leg bridge 3x8   Ham curl green ball 3x10 5"  Clamshell red theraband 3x10 right lv 2  Single leg balance [id number]   Single leg dynamic balance forward reaches [id number]     Not today:  Tripod hip ext 3x10 right  Glute bridge [id number] green theraband thighs +10#  Hamstring isometrics [id number]   Quad sets 2x20x5"  Hooklying hip add c blue ball [id number]    Ewa received therapeutic exercises to develop strength, endurance, ROM, flexibility, and posture for 17 minutes including:  Prone HS Curls 3x10 red theraband  Single leg shuttle 50# 2x15  Lateral Monster walk Yellow theraband 3 laps     Not today:  Step up 12" step  Standing internal rotation/external rotation on stool 3x10  DBL Shuttle Press 2 black bands 2x10  Standing hip Abd c internal rotation 3x10  Standing hip ext 3x10  Hand heel rocking gentle not through pan x10  DKTC w/ towel 10x 10sec hold   Prone Hip Ext 2x10    Ewa participated in dynamic functional therapeutic activities to improve functional performance for 15 minutes, including:  Recumbent Bike 10min lv 3  TRX single leg squat to bench double leg stand 3x10     Standing double limb 50% tolerance    Ewa participated in gait training to improve functional mobility and safety for 00 minutes, including:  Ambulation increased to single axillary crutch. Proper gait cued throughout. Pt can ambulate with no pain but does continue to demonstrate abnormal mechanics.      Home Exercises and Patient Education Provided     Education provided:   - Pt educated on POC  - Pt educated on HEP  - Pt educated on anatomy and physiology of " current condition as it relates to signs and symptoms     Written Home Exercises Provided: yes.  Exercises were reviewed and Ewa was able to demonstrate them prior to the end of the session.  Ewa demonstrated good  understanding of the education provided.      See EMR under Patient Instructions for exercises provided 9/18/2023.     Assessment    Ewa presented to PT with reports of DOMS. No advancements made to treatment today due to soreness post last visit and level of difficulty. She has no pain with any of the exercises today and reported increased ease of motion. Continues to demonstrate fatigue with single leg activity.     Plan of care discussed with patient: Continue with previous HEP  Patient's spiritual, cultural and educational needs considered and patient is agreeable to the plan of care and goals as stated below:      Anticipated Barriers for therapy: none    Goals:   Short Term Goals (8 Weeks):  1. Pt will be compliant with initial HEP to supplement PT in restoring pain free function.  2. Pt will reduce worst pain to 4/10 in order to progress functional deficits  3. Pt will ambulate 1mile without pain in order to return to previous level of function  4. Pt will improve hip flex strength to 4/5 in order to improve functional mobility      Long Term Goals (16 Weeks):  1. Pt will improve FOTO score to </= 73% limited to decrease perceived limitation with mobility.   2. Pt will reduce worst pain to 1/10 in order to progress functional deficits  3. Pt will have hip strength greater than 90% of contralateral lower extremity in on order to return to previous level of function  4. Pt will have full hip range of motion compared to contralateral lower extremity in order to return to previous level of function.        Plan     Continue with plan of care as indicated    Audrey Joseph, PT

## 2023-11-15 ENCOUNTER — CLINICAL SUPPORT (OUTPATIENT)
Dept: REHABILITATION | Facility: HOSPITAL | Age: 23
End: 2023-11-15
Payer: OTHER GOVERNMENT

## 2023-11-15 DIAGNOSIS — M25.60 DECREASED RANGE OF MOTION: ICD-10-CM

## 2023-11-15 DIAGNOSIS — R26.9 ABNORMAL GAIT: Primary | ICD-10-CM

## 2023-11-15 DIAGNOSIS — R53.1 DECREASED STRENGTH: ICD-10-CM

## 2023-11-15 PROCEDURE — 97530 THERAPEUTIC ACTIVITIES: CPT | Mod: PN

## 2023-11-15 PROCEDURE — 97112 NEUROMUSCULAR REEDUCATION: CPT | Mod: PN

## 2023-11-15 PROCEDURE — 97110 THERAPEUTIC EXERCISES: CPT | Mod: PN

## 2023-11-15 NOTE — PROGRESS NOTES
[facility]   Physical Therapy Treatment Note      Name: Ewa Welch  Clinic Number: [id number]    Therapy Diagnosis:   Encounter Diagnoses   Name Primary?    Abnormal gait Yes    Decreased range of motion     Decreased strength            Physician: Milton Hogan*    Visit Date: 11/15/2023      Therapy Diagnosis:        Encounter Diagnoses   Name Primary?    Tear of right acetabular labrum, initial encounter      Femoroacetabular impingement of right hip      Abnormal gait      Decreased range of motion      Decreased strength          Physician: Milton Hogan*     Physician Orders: PT Eval and Treat   Medical Diagnosis from Referral: S73.191A (ICD-10-CM) - Tear of right acetabular labrum, initial encounter M25.851 (ICD-10-CM) - Femoroacetabular impingement of right hip  Evaluation Date: 9/18/2023  Authorization Period Expiration: 12/31/2023  Plan of Care Expiration: 1/16/2024  Progress Note Due: 11/18/2023  Visit # / Visits authorized: 16/20 + eval  FOTO: 5/5     Time In: 4:01 pm  Time Out: 4:59 pm  Total Appointment Time (timed & untimed codes): 58 minutes (1MT, 1NMR, , 2TE)     Precautions: Standard   DOC 9/15/2023  PROCEDURE PERFORMED:   Right arthroscopic acetabular labral repair  Right arthroscopic osteoplasty of cam lesion  Right arthroscopic capsular plication     She will follow the hip arthroscopy with acetabular labral repair and osteoplasty protocol.  Return to clinic in 2 weeks.  Subjective     Patient reports: Has had some trouble sleeping on either side of her hips, is curious if that is normal.   She was compliant with home exercise program.  Response to previous treatment: sore  Functional change: NA     Pain: 0/10  Location: right hip - only have discomfort if she moves a certain way    Objective      Objective Measures updated at progress report unless specified.       Treatment     Ewa received the treatments listed below:      Ewa received  "the following manual therapy techniques: Joint mobilizations were applied to the: right hip for 0 minutes, including:  Gentle circumduction mobilization  Gentle long axis distraction    Lateral hip distraction - great pain relief     Ewa participated in neuromuscular re-education activities to improve: Coordination, Sense, Proprioception, and Posture for 25 minutes. The following activities were included:    Single leg bridge 3x8   Supine bridge with Ham curl green ball 3x10  Clamshell red theraband 3x10 right lv 2    Not today:  Single leg balance [id number]   Single leg dynamic balance forward reaches [id number]   Tripod hip ext 3x10 right  Glute bridge [id number] green theraband thighs +10#  Hamstring isometrics [id number]   Quad sets 2x20x5"  Hooklying hip add c blue ball [id number]    Ewa received therapeutic exercises to develop strength, endurance, ROM, flexibility, and posture for 17 minutes including:  Prone HS Curls 3x10 red theraband  Single leg shuttle 50# 2x15  Lateral Monster walk Yellow theraband 3 laps  Side lunge with slider 3x10      Not today:  Step up 12" step  Standing internal rotation/external rotation on stool 3x10  DBL Shuttle Press 2 black bands 2x10  Standing hip Abd c internal rotation 3x10  Standing hip ext 3x10  Hand heel rocking gentle not through pan x10  DKTC w/ towel 10x 10sec hold   Prone Hip Ext 2x10    Ewa participated in dynamic functional therapeutic activities to improve functional performance for 15 minutes, including:  Recumbent Bike 10min lv 3  TRX single leg squat to bench double leg stand 3x10     Standing double limb 50% tolerance    Ewa participated in gait training to improve functional mobility and safety for 00 minutes, including:  Ambulation increased to single axillary crutch. Proper gait cued throughout. Pt can ambulate with no pain but does continue to demonstrate abnormal mechanics.      Home Exercises and Patient Education Provided     Education provided: "   - Pt educated on normal healing process    Written Home Exercises Provided: continue with previous HEP.  Exercises were reviewed and Ewa was able to demonstrate them prior to the end of the session.  Ewa demonstrated good  understanding of the education provided.      See EMR under Patient Instructions for exercises provided 9/18/2023.     Assessment    Ewa presented to PT with no pain complaints. She was slightly advanced in hip extension exercises in order to improve functional mobility. Single leg activities continue to be a challenge especially with focus on hip abduction. Physical Therapist to continue to progress patient in order to return her to previous level of function.     Plan of care discussed with patient: Continue with previous HEP  Patient's spiritual, cultural and educational needs considered and patient is agreeable to the plan of care and goals as stated below:      Anticipated Barriers for therapy: none    Goals:   Short Term Goals (8 Weeks):  1. Pt will be compliant with initial HEP to supplement PT in restoring pain free function.  2. Pt will reduce worst pain to 4/10 in order to progress functional deficits  3. Pt will ambulate 1mile without pain in order to return to previous level of function  4. Pt will improve hip flex strength to 4/5 in order to improve functional mobility      Long Term Goals (16 Weeks):  1. Pt will improve FOTO score to </= 73% limited to decrease perceived limitation with mobility.   2. Pt will reduce worst pain to 1/10 in order to progress functional deficits  3. Pt will have hip strength greater than 90% of contralateral lower extremity in on order to return to previous level of function  4. Pt will have full hip range of motion compared to contralateral lower extremity in order to return to previous level of function.        Plan     Continue with plan of care as indicated    Audrey Joseph PT

## 2023-11-29 ENCOUNTER — CLINICAL SUPPORT (OUTPATIENT)
Dept: REHABILITATION | Facility: HOSPITAL | Age: 23
End: 2023-11-29
Payer: OTHER GOVERNMENT

## 2023-11-29 DIAGNOSIS — M25.60 DECREASED RANGE OF MOTION: ICD-10-CM

## 2023-11-29 DIAGNOSIS — R26.9 ABNORMAL GAIT: Primary | ICD-10-CM

## 2023-11-29 DIAGNOSIS — R53.1 DECREASED STRENGTH: ICD-10-CM

## 2023-11-29 PROCEDURE — 97530 THERAPEUTIC ACTIVITIES: CPT | Mod: PN

## 2023-11-29 PROCEDURE — 97110 THERAPEUTIC EXERCISES: CPT | Mod: PN

## 2023-11-29 PROCEDURE — 97112 NEUROMUSCULAR REEDUCATION: CPT | Mod: PN

## 2023-11-29 NOTE — PROGRESS NOTES
[facility]   Physical Therapy Treatment Note      Name: Ewa Welch  [facility] Number: [id number]    Therapy Diagnosis:   Encounter Diagnoses   Name Primary?    Abnormal gait Yes    Decreased range of motion     Decreased strength            Physician: Milton Hogan*    Visit Date: 11/29/2023      Therapy Diagnosis:        Encounter Diagnoses   Name Primary?    Tear of right acetabular labrum, initial encounter      Femoroacetabular impingement of right hip      Abnormal gait      Decreased range of motion      Decreased strength          Physician: Milton Hogan*     Physician Orders: PT Eval and Treat   Medical Diagnosis from Referral: S73.191A (ICD-10-CM) - Tear of right acetabular labrum, initial encounter M25.851 (ICD-10-CM) - Femoroacetabular impingement of right hip  Evaluation Date: 9/18/2023  Authorization Period Expiration: 12/31/2023  Plan of Care Expiration: 1/16/2024  Progress Note Due: 12/18/2023  Visit # / Visits authorized: 17/20 + eval  FOTO: 5/5     Time In: 4:01 pm  Time Out: 4:59 pm  Total Appointment Time (timed & untimed codes): 58 minutes (1MT, 1NMR, , 2TE)     Precautions: Standard   DOC 9/15/2023  PROCEDURE PERFORMED:   Right arthroscopic acetabular labral repair  Right arthroscopic osteoplasty of cam lesion  Right arthroscopic capsular plication     She will follow the hip arthroscopy with acetabular labral repair and osteoplasty protocol.  Return to clinic in 2 weeks.  Subjective     Patient reports: Doing well but definitely feels like she is out of shape. Will have moments where she feels like if she moves her leg more, something will catch.    She was compliant with home exercise program.  Response to previous treatment: sore  Functional change: NA     Pain: 0/10  Location: right hip - only have discomfort if she moves a certain way    Objective      Objective Measures updated at progress report unless specified.       Treatment     Ewa  "received the treatments listed below:      Ewa received the following manual therapy techniques: Joint mobilizations were applied to the: right hip for 0 minutes, including:  Gentle circumduction mobilization  Gentle long axis distraction    Lateral hip distraction - great pain relief     Ewa participated in neuromuscular re-education activities to improve: Coordination, Sense, Proprioception, and Posture for 25 minutes. The following activities were included:    Single leg bridge 3x8   Supine bridge with Ham curl green ball 3x10  Clamshell red theraband 3x10 right lv 2    Not today:  Single leg balance [id number]   Single leg dynamic balance forward reaches [id number]   Tripod hip ext 3x10 right  Glute bridge [id number] green theraband thighs +10#  Hamstring isometrics [id number]   Quad sets 2x20x5"  Hooklying hip add c blue ball [id number]    Ewa received therapeutic exercises to develop strength, endurance, ROM, flexibility, and posture for 17 minutes including:  Prone HS Curls 3x10 red theraband  Single leg shuttle 50# 2x15  Lateral Monster walk Yellow theraloop 3 laps  Side lunge with slider 3x10      Not today:  Step up 12" step  Standing internal rotation/external rotation on stool 3x10  DBL Shuttle Press 2 black bands 2x10  Standing hip Abd c internal rotation 3x10  Standing hip ext 3x10  Hand heel rocking gentle not through pan x10  DKTC w/ towel 10x 10sec hold   Prone Hip Ext 2x10    Ewa participated in dynamic functional therapeutic activities to improve functional performance for 15 minutes, including:  Upright Bike 10min lv 3  TRX single leg squat to bench double leg stand 3x10     Standing double limb 50% tolerance    Ewa participated in gait training to improve functional mobility and safety for 00 minutes, including:  Ambulation increased to single axillary crutch. Proper gait cued throughout. Pt can ambulate with no pain but does continue to demonstrate abnormal mechanics.      Home Exercises " and Patient Education Provided     Education provided:   - Pt educated on normal healing process    Written Home Exercises Provided: continue with previous HEP.  Exercises were reviewed and Ewa was able to demonstrate them prior to the end of the session.  Ewa demonstrated good  understanding of the education provided.      See EMR under Patient Instructions for exercises provided 9/18/2023.     Assessment    Ewa presented to PT with reports of occasional sense of locking. PT was unable to reproduce this today.  Focus of PT continues to be strength. Multiple exercises compared to uninvolved limb. She was unable to perform to the capabilities of the uninvolved extremity. Fatigue continues primarily in hip abduction strength. PT to continue to progress strengthening and reduce sense of locking.     Plan of care discussed with patient: Continue with previous HEP  Patient's spiritual, cultural and educational needs considered and patient is agreeable to the plan of care and goals as stated below:      Anticipated Barriers for therapy: none    Goals:   Short Term Goals (8 Weeks):  1. Pt will be compliant with initial HEP to supplement PT in restoring pain free function.  2. Pt will reduce worst pain to 4/10 in order to progress functional deficits  3. Pt will ambulate 1mile without pain in order to return to previous level of function  4. Pt will improve hip flex strength to 4/5 in order to improve functional mobility      Long Term Goals (16 Weeks):  1. Pt will improve FOTO score to </= 73% limited to decrease perceived limitation with mobility.   2. Pt will reduce worst pain to 1/10 in order to progress functional deficits  3. Pt will have hip strength greater than 90% of contralateral lower extremity in on order to return to previous level of function  4. Pt will have full hip range of motion compared to contralateral lower extremity in order to return to previous level of function.        Plan     Continue  with plan of care as indicated    Audrey Joseph, PT

## 2023-12-01 ENCOUNTER — CLINICAL SUPPORT (OUTPATIENT)
Dept: REHABILITATION | Facility: HOSPITAL | Age: 23
End: 2023-12-01
Payer: OTHER GOVERNMENT

## 2023-12-01 DIAGNOSIS — R53.1 DECREASED STRENGTH: ICD-10-CM

## 2023-12-01 DIAGNOSIS — R26.9 ABNORMAL GAIT: Primary | ICD-10-CM

## 2023-12-01 DIAGNOSIS — M25.60 DECREASED RANGE OF MOTION: ICD-10-CM

## 2023-12-01 PROCEDURE — 97110 THERAPEUTIC EXERCISES: CPT | Mod: PN

## 2023-12-01 PROCEDURE — 97530 THERAPEUTIC ACTIVITIES: CPT | Mod: PN

## 2023-12-01 PROCEDURE — 97112 NEUROMUSCULAR REEDUCATION: CPT | Mod: PN

## 2023-12-01 NOTE — PROGRESS NOTES
[facility]   Physical Therapy Treatment Note      Name: Ewa Welch  [facility] Number: [id number]    Therapy Diagnosis:   Encounter Diagnoses   Name Primary?    Abnormal gait Yes    Decreased range of motion     Decreased strength          Physician: Milton Hogan*    Visit Date: 12/1/2023      Therapy Diagnosis:        Encounter Diagnoses   Name Primary?    Tear of right acetabular labrum, initial encounter      Femoroacetabular impingement of right hip      Abnormal gait      Decreased range of motion      Decreased strength          Physician: Milton Hogan*     Physician Orders: PT Eval and Treat   Medical Diagnosis from Referral: S73.191A (ICD-10-CM) - Tear of right acetabular labrum, initial encounter M25.851 (ICD-10-CM) - Femoroacetabular impingement of right hip  Evaluation Date: 9/18/2023  Authorization Period Expiration: 12/31/2023  Plan of Care Expiration: 1/16/2024  Progress Note Due: 12/18/2023  Visit # / Visits authorized: 18/20 + eval  FOTO: 5/5     Time In: 8:08am  Time Out: 9:00m  Total Appointment Time (timed & untimed codes): 52 minutes      Precautions: Standard   DOC 9/15/2023  PROCEDURE PERFORMED:   Right arthroscopic acetabular labral repair  Right arthroscopic osteoplasty of cam lesion  Right arthroscopic capsular plication     She will follow the hip arthroscopy with acetabular labral repair and osteoplasty protocol.  Return to clinic in 2 weeks.  Subjective     Patient reports: Was a little sore after last visit. 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   She was compliant with home exercise program.  Response to previous treatment: sore  Functional change: NA     Pain: 0/10  Location: right hip - only have discomfort if she moves a certain way    Objective      Objective Measures updated at progress report unless specified.       Treatment     Ewa received the treatments listed below:      Ewa received the following manual  "therapy techniques: Joint mobilizations were applied to the: right hip for 0 minutes, including:  Gentle circumduction mobilization  Gentle long axis distraction    Lateral hip distraction - great pain relief     Ewa participated in neuromuscular re-education activities to improve: Coordination, Sense, Proprioception, and Posture for 25 minutes. The following activities were included:    Single leg bridge on bench B 3x10   Supine bridge with Ham curl green ball 3x10  Clamshell at wall GTB 3x10 B    Not today:  Single leg balance [id number]   Single leg dynamic balance forward reaches [id number]   Tripod hip ext 3x10 right  Glute bridge [id number] green theraband thighs +10#  Hamstring isometrics [id number]   Quad sets 2x20x5"  Hooklying hip add c blue ball [id number]    Ewa received therapeutic exercises to develop strength, endurance, ROM, flexibility, and posture for 17 minutes including:  Prone HS Curls 3x10 red theraband  Single leg shuttle 50# 2x15  Lateral Monster walk Yellow theraloop 3 laps  Side lunge/back lunch with slider 4x5      Not today:  Step up 12" step  Standing internal rotation/external rotation on stool 3x10  DBL Shuttle Press 2 black bands 2x10  Standing hip Abd c internal rotation 3x10  Standing hip ext 3x10  Hand heel rocking gentle not through pan x10  DKTC w/ towel 10x 10sec hold   Prone Hip Ext 2x10    Ewa participated in dynamic functional therapeutic activities to improve functional performance for 15 minutes, including:  Upright Bike 10min lv 3  TRX single leg squat to bench double leg stand 3x10     Standing double limb 50% tolerance    Ewa participated in gait training to improve functional mobility and safety for 00 minutes, including:  Ambulation increased to single axillary crutch. Proper gait cued throughout. Pt can ambulate with no pain but does continue to demonstrate abnormal mechanics.      Home Exercises and Patient Education Provided     Education provided:   - Pt " educated on normal healing process    Written Home Exercises Provided: continue with previous HEP.  Exercises were reviewed and Ewa was able to demonstrate them prior to the end of the session.  Ewa demonstrated good  understanding of the education provided.      See EMR under Patient Instructions for exercises provided 9/18/2023.     Assessment    Ewa presented to PT with reports of delayed onset muscle soreness. She is progressing well and advancements were made to activities. Single leg focus but with alternating LE's in order to see strength difference. PT to continue to progress strength towards previous level of function.     Plan of care discussed with patient: Continue with previous HEP  Patient's spiritual, cultural and educational needs considered and patient is agreeable to the plan of care and goals as stated below:      Anticipated Barriers for therapy: none    Goals:   Short Term Goals (8 Weeks):  1. Pt will be compliant with initial HEP to supplement PT in restoring pain free function.  2. Pt will reduce worst pain to 4/10 in order to progress functional deficits  3. Pt will ambulate 1mile without pain in order to return to previous level of function  4. Pt will improve hip flex strength to 4/5 in order to improve functional mobility      Long Term Goals (16 Weeks):  1. Pt will improve FOTO score to </= 73% limited to decrease perceived limitation with mobility.   2. Pt will reduce worst pain to 1/10 in order to progress functional deficits  3. Pt will have hip strength greater than 90% of contralateral lower extremity in on order to return to previous level of function  4. Pt will have full hip range of motion compared to contralateral lower extremity in order to return to previous level of function.        Plan     Continue with plan of care as indicated    Audrey Joseph, PT

## 2023-12-04 ENCOUNTER — CLINICAL SUPPORT (OUTPATIENT)
Dept: REHABILITATION | Facility: HOSPITAL | Age: 23
End: 2023-12-04
Payer: OTHER GOVERNMENT

## 2023-12-04 DIAGNOSIS — R53.1 DECREASED STRENGTH: ICD-10-CM

## 2023-12-04 DIAGNOSIS — R26.9 ABNORMAL GAIT: Primary | ICD-10-CM

## 2023-12-04 DIAGNOSIS — M25.60 DECREASED RANGE OF MOTION: ICD-10-CM

## 2023-12-04 PROCEDURE — 97530 THERAPEUTIC ACTIVITIES: CPT | Mod: PN,CQ

## 2023-12-04 PROCEDURE — 97112 NEUROMUSCULAR REEDUCATION: CPT | Mod: PN,CQ

## 2023-12-04 PROCEDURE — 97110 THERAPEUTIC EXERCISES: CPT | Mod: PN,CQ

## 2023-12-04 NOTE — PROGRESS NOTES
[facility]   Physical Therapy Treatment Note      Name: Ewa Welch  [facility] Number: [id number]    Therapy Diagnosis:   Encounter Diagnoses   Name Primary?    Abnormal gait Yes    Decreased range of motion     Decreased strength            Physician: Milton Hogan*    Visit Date: 12/4/2023      Therapy Diagnosis:        Encounter Diagnoses   Name Primary?    Tear of right acetabular labrum, initial encounter      Femoroacetabular impingement of right hip      Abnormal gait      Decreased range of motion      Decreased strength          Physician: Milton Hogan*     Physician Orders: PT Eval and Treat   Medical Diagnosis from Referral: S73.191A (ICD-10-CM) - Tear of right acetabular labrum, initial encounter M25.851 (ICD-10-CM) - Femoroacetabular impingement of right hip  Evaluation Date: 9/18/2023  Authorization Period Expiration: 12/31/2023  Plan of Care Expiration: 1/16/2024  Progress Note Due: 12/18/2023  Visit # / Visits authorized: 19/20 + eval  FOTO: 5/5     Time In: 8:00 am  Time Out: 9:00 am  Total Appointment Time (timed & untimed codes): 54 minutes      Precautions: Standard   DOC 9/15/2023  PROCEDURE PERFORMED:   Right arthroscopic acetabular labral repair  Right arthroscopic osteoplasty of cam lesion  Right arthroscopic capsular plication     She will follow the hip arthroscopy with acetabular labral repair and osteoplasty protocol.  Return to clinic in 2 weeks.  Subjective     Patient reports: The only pain she ever has in the hip is when she lifts her leg and turns it out.  Otherwise, she still feels weak.     She was compliant with home exercise program.  Response to previous treatment: sore  Functional change: NA     Pain: 0/10  Location: right hip - only have discomfort if she moves a certain way    Objective      Objective Measures updated at progress report unless specified.       Treatment     Ewa received the treatments listed below:   "    Ewa received the following manual therapy techniques: Joint mobilizations were applied to the: right hip for 0 minutes, including:  Gentle circumduction mobilization  Gentle long axis distraction    Lateral hip distraction - great pain relief     Ewa participated in neuromuscular re-education activities to improve: Coordination, Sense, Proprioception, and Posture for 24 minutes. The following activities were included:    Single leg bridge on bench B 3x10   Supine bridge with Ham curl green ball 3x15  Clamshell at wall GTB 3x10 B    Not today:  Single leg balance [id number]   Single leg dynamic balance forward reaches 3p27wka   Tripod hip ext 3x10 right  Glute bridge [id number] green theraband thighs +10#  Hamstring isometrics [id number]   Quad sets 2x20x5"  Hooklying hip add c blue ball [id number]    Ewa received therapeutic exercises to develop strength, endurance, ROM, flexibility, and posture for 15 minutes including:  Prone HS Curls 3x10 red theraband  Single leg shuttle 50# 2x15  Lateral Monster walk Yellow theraloop 3 laps  Side lunge/back lunch with slider 4x5      Not today:  Step up 12" step  Standing internal rotation/external rotation on stool 3x10  DBL Shuttle Press 2 black bands 2x10  Standing hip Abd c internal rotation 3x10  Standing hip ext 3x10  Hand heel rocking gentle not through pan x10  DKTC w/ towel 10x 10sec hold   Prone Hip Ext 2x10    Ewa participated in dynamic functional therapeutic activities to improve functional performance for 15 minutes, including:  Upright Bike 10min lv 3  TRX single leg squat to bench double leg stand 3x12     Standing double limb 50% tolerance    Ewa participated in gait training to improve functional mobility and safety for 00 minutes, including:  Ambulation increased to single axillary crutch. Proper gait cued throughout. Pt can ambulate with no pain but does continue to demonstrate abnormal mechanics.      Home Exercises and Patient Education " Provided     Education provided:   - Pt educated on normal healing process    Written Home Exercises Provided: continue with previous HEP.  Exercises were reviewed and Ewa was able to demonstrate them prior to the end of the session.  Ewa demonstrated good  understanding of the education provided.      See EMR under Patient Instructions for exercises provided 9/18/2023.     Assessment   Ewa presented to PT reporting increased pain with movements involving right hip flexion with ER. Progressed number of sets as well as resistance to multiple exercises for improved RLE strength.  Ewa is most challenged with Single Leg Squats and Lunges with Slider, secondary to quad and glute fatigue.  However, she was able to complete today's session without reports of increased right hip pain or discomfort.  Will continue to improve RLE strength to continue to improve to previous levels of function.      Plan of care discussed with patient: Continue with previous HEP  Patient's spiritual, cultural and educational needs considered and patient is agreeable to the plan of care and goals as stated below:      Anticipated Barriers for therapy: none    Goals:   Short Term Goals (8 Weeks):  1. Pt will be compliant with initial HEP to supplement PT in restoring pain free function.  2. Pt will reduce worst pain to 4/10 in order to progress functional deficits  3. Pt will ambulate 1mile without pain in order to return to previous level of function  4. Pt will improve hip flex strength to 4/5 in order to improve functional mobility      Long Term Goals (16 Weeks):  1. Pt will improve FOTO score to </= 73% limited to decrease perceived limitation with mobility.   2. Pt will reduce worst pain to 1/10 in order to progress functional deficits  3. Pt will have hip strength greater than 90% of contralateral lower extremity in on order to return to previous level of function  4. Pt will have full hip range of motion compared to  contralateral lower extremity in order to return to previous level of function.        Plan     Continue with plan of care as indicated    Jennifer Armin, PTA

## 2023-12-04 NOTE — PROGRESS NOTES
[facility]   Physical Therapy Treatment Note      Name: Ewa Welch  [facility] Number: [id number]    Therapy Diagnosis:   No diagnosis found.        Physician: Milton Hogan*    Visit Date: 12/4/2023      Therapy Diagnosis:        Encounter Diagnoses   Name Primary?    Tear of right acetabular labrum, initial encounter      Femoroacetabular impingement of right hip      Abnormal gait      Decreased range of motion      Decreased strength          Physician: Milton Hogan*     Physician Orders: PT Eval and Treat   Medical Diagnosis from Referral: S73.191A (ICD-10-CM) - Tear of right acetabular labrum, initial encounter M25.851 (ICD-10-CM) - Femoroacetabular impingement of right hip  Evaluation Date: 9/18/2023  Authorization Period Expiration: 12/31/2023  Plan of Care Expiration: 1/16/2024  Progress Note Due: 12/18/2023  Visit # / Visits authorized: 18/20 + eval  FOTO: 5/5     Time In: 8:08am  Time Out: 9:00m  Total Appointment Time (timed & untimed codes): 52 minutes      Precautions: Standard   DOC 9/15/2023  PROCEDURE PERFORMED:   Right arthroscopic acetabular labral repair  Right arthroscopic osteoplasty of cam lesion  Right arthroscopic capsular plication     She will follow the hip arthroscopy with acetabular labral repair and osteoplasty protocol.  Return to clinic in 2 weeks.  Subjective     Patient reports: Was a little sore after last visit. 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   She was compliant with home exercise program.  Response to previous treatment: sore  Functional change: NA     Pain: 0/10  Location: right hip - only have discomfort if she moves a certain way    Objective      Objective Measures updated at progress report unless specified.       Treatment     Ewa received the treatments listed below:      Ewa received the following manual therapy techniques: Joint mobilizations were applied to the: right hip for 0 minutes,  "including:  Gentle circumduction mobilization  Gentle long axis distraction    Lateral hip distraction - great pain relief     Ewa participated in neuromuscular re-education activities to improve: Coordination, Sense, Proprioception, and Posture for 25 minutes. The following activities were included:    Single leg bridge on bench B 3x10   Supine bridge with Ham curl green ball 3x10  Clamshell at wall GTB 3x10 B    Not today:  Single leg balance [id number]   Single leg dynamic balance forward reaches [id number]   Tripod hip ext 3x10 right  Glute bridge [id number] green theraband thighs +10#  Hamstring isometrics [id number]   Quad sets 2x20x5"  Hooklying hip add c blue ball [id number]    Ewa received therapeutic exercises to develop strength, endurance, ROM, flexibility, and posture for 17 minutes including:  Prone HS Curls 3x10 red theraband  Single leg shuttle 50# 2x15  Lateral Monster walk Yellow theraloop 3 laps  Side lunge/back lunch with slider 4x5      Not today:  Step up 12" step  Standing internal rotation/external rotation on stool 3x10  DBL Shuttle Press 2 black bands 2x10  Standing hip Abd c internal rotation 3x10  Standing hip ext 3x10  Hand heel rocking gentle not through pan x10  DKTC w/ towel 10x 10sec hold   Prone Hip Ext 2x10    Ewa participated in dynamic functional therapeutic activities to improve functional performance for 15 minutes, including:  Upright Bike 10min lv 3  TRX single leg squat to bench double leg stand 3x10     Standing double limb 50% tolerance    Ewa participated in gait training to improve functional mobility and safety for 00 minutes, including:  Ambulation increased to single axillary crutch. Proper gait cued throughout. Pt can ambulate with no pain but does continue to demonstrate abnormal mechanics.      Home Exercises and Patient Education Provided     Education provided:   - Pt educated on normal healing process    Written Home Exercises Provided: continue with " previous HEP.  Exercises were reviewed and Ewa was able to demonstrate them prior to the end of the session.  Ewa demonstrated good  understanding of the education provided.      See EMR under Patient Instructions for exercises provided 9/18/2023.     Assessment    Ewa presented to PT with reports of delayed onset muscle soreness. She is progressing well and advancements were made to activities. Single leg focus but with alternating LE's in order to see strength difference. PT to continue to progress strength towards previous level of function.     Plan of care discussed with patient: Continue with previous HEP  Patient's spiritual, cultural and educational needs considered and patient is agreeable to the plan of care and goals as stated below:      Anticipated Barriers for therapy: none    Goals:   Short Term Goals (8 Weeks):  1. Pt will be compliant with initial HEP to supplement PT in restoring pain free function.  2. Pt will reduce worst pain to 4/10 in order to progress functional deficits  3. Pt will ambulate 1mile without pain in order to return to previous level of function  4. Pt will improve hip flex strength to 4/5 in order to improve functional mobility      Long Term Goals (16 Weeks):  1. Pt will improve FOTO score to </= 73% limited to decrease perceived limitation with mobility.   2. Pt will reduce worst pain to 1/10 in order to progress functional deficits  3. Pt will have hip strength greater than 90% of contralateral lower extremity in on order to return to previous level of function  4. Pt will have full hip range of motion compared to contralateral lower extremity in order to return to previous level of function.        Plan     Continue with plan of care as indicated    Audrey Joseph, PT

## 2023-12-06 ENCOUNTER — CLINICAL SUPPORT (OUTPATIENT)
Dept: REHABILITATION | Facility: HOSPITAL | Age: 23
End: 2023-12-06
Payer: OTHER GOVERNMENT

## 2023-12-06 DIAGNOSIS — M25.60 DECREASED RANGE OF MOTION: ICD-10-CM

## 2023-12-06 DIAGNOSIS — R53.1 DECREASED STRENGTH: ICD-10-CM

## 2023-12-06 DIAGNOSIS — R26.9 ABNORMAL GAIT: Primary | ICD-10-CM

## 2023-12-06 PROCEDURE — 97140 MANUAL THERAPY 1/> REGIONS: CPT | Mod: PN

## 2023-12-06 PROCEDURE — 97112 NEUROMUSCULAR REEDUCATION: CPT | Mod: PN

## 2023-12-06 PROCEDURE — 97530 THERAPEUTIC ACTIVITIES: CPT | Mod: PN

## 2023-12-06 PROCEDURE — 97110 THERAPEUTIC EXERCISES: CPT | Mod: PN

## 2023-12-06 NOTE — PROGRESS NOTES
[facility]   Physical Therapy Treatment Note      Name: Ewa Welch  [facility] Number: [id number]    Therapy Diagnosis:   Encounter Diagnoses   Name Primary?    Abnormal gait Yes    Decreased range of motion     Decreased strength      Physician: Milton Hogan*    Visit Date: 12/6/2023      Therapy Diagnosis:        Encounter Diagnoses   Name Primary?    Tear of right acetabular labrum, initial encounter      Femoroacetabular impingement of right hip      Abnormal gait      Decreased range of motion      Decreased strength          Physician: Milton Hogan*     Physician Orders: PT Eval and Treat   Medical Diagnosis from Referral: S73.191A (ICD-10-CM) - Tear of right acetabular labrum, initial encounter M25.851 (ICD-10-CM) - Femoroacetabular impingement of right hip  Evaluation Date: 9/18/2023  Authorization Period Expiration: 12/31/2023  Plan of Care Expiration: 1/16/2024  Progress Note Due: 12/18/2023  Visit # / Visits authorized: 20/40 + eval  FOTO: 5/5     Time In: 7:02 am  Time Out: 8:00 am  Total Appointment Time (timed & untimed codes): 58 minutes      Precautions: Standard   DOC 9/15/2023  PROCEDURE PERFORMED:   Right arthroscopic acetabular labral repair  Right arthroscopic osteoplasty of cam lesion  Right arthroscopic capsular plication     She will follow the hip arthroscopy with acetabular labral repair and osteoplasty protocol.  Return to clinic in 2 weeks.  Subjective     Patient reports: She sometimes will feel a sudden shooting pain when she goes to put on a shoe or when she jumped back cause she stepped on something. She did it last night and it lasted a few minutes after it happened.     She was compliant with home exercise program.  Response to previous treatment: sore  Functional change: NA     Pain: 0/10  Location: right hip - only have discomfort if she moves a certain way    Objective      Objective Measures updated at progress report unless  "specified.     Special tests:  + LIZETTE - pain and reduced motion on Right     Joint mobility:  Hip IR full, ER she has hesitation and pain    Treatment     Ewa received the treatments listed below:      Ewa received the following manual therapy techniques: Joint mobilizations were applied to the: right hip for 12 minutes, including:    Long axis distraction mobilization and manipulation  Lateral hip distraction - great pain relief    Gentle circumduction mobilization     Ewa participated in neuromuscular re-education activities to improve: Coordination, Sense, Proprioception, and Posture for 19 minutes. The following activities were included:    Single leg bridge on bench B 3x12   Supine bridge with Ham curl green ball 3x15  Clamshell at wall GTB 3x10 B  Plank on elbows hold [id number]    Not today:  Single leg balance [id number]   Single leg dynamic balance forward reaches [id number]   Tripod hip ext 3x10 right  Glute bridge [id number] green theraband thighs +10#  Hamstring isometrics [id number]   Quad sets 2x20x5"  Hooklying hip add c blue ball [id number]    Ewa received therapeutic exercises to develop strength, endurance, ROM, flexibility, and posture for 12 minutes including:    Lateral Monster walk Yellow theraloop 3 laps  Side lunge/back lunch with slider 4x5      Not today:  Prone HS Curls 3x10 red theraband  Single leg shuttle 50# 2x15  Step up 12" step  Standing internal rotation/external rotation on stool 3x10  DBL Shuttle Press 2 black bands 2x10  Standing hip Abd c internal rotation 3x10  Standing hip ext 3x10  Hand heel rocking gentle not through pan x10  DKTC w/ towel 10x 10sec hold   Prone Hip Ext 2x10    Ewa participated in dynamic functional therapeutic activities to improve functional performance for 15 minutes, including:  Upright Bike 10min lv 3  TRX single leg squat to bench double leg stand 3x12     Standing double limb 50% tolerance    Ewa participated in gait training to improve " functional mobility and safety for 00 minutes, including:  Ambulation increased to single axillary crutch. Proper gait cued throughout. Pt can ambulate with no pain but does continue to demonstrate abnormal mechanics.      Home Exercises and Patient Education Provided     Education provided:   - Pt educated on standing lateral hip distraction with band    Written Home Exercises Provided: continue with previous HEP.  Exercises were reviewed and Ewa was able to demonstrate them prior to the end of the session.  Ewa demonstrated good  understanding of the education provided.      See EMR under Patient Instructions for exercises provided 9/18/2023.     Assessment   Ewa presented to PT reporting signs and symptoms of impingement. Relief continues with long axis and lateral hip distraction. She was taught how to self distract at home in order to improve hip mobility and reduce pain. Hip strength continue to be a focus due to glute weakness. Improvements seen and advancement may be needed next visit.     Plan of care discussed with patient: Continue with previous HEP  Patient's spiritual, cultural and educational needs considered and patient is agreeable to the plan of care and goals as stated below:      Anticipated Barriers for therapy: none    Goals:   Short Term Goals (8 Weeks):  1. Pt will be compliant with initial HEP to supplement PT in restoring pain free function.  2. Pt will reduce worst pain to 4/10 in order to progress functional deficits  3. Pt will ambulate 1mile without pain in order to return to previous level of function  4. Pt will improve hip flex strength to 4/5 in order to improve functional mobility      Long Term Goals (16 Weeks):  1. Pt will improve FOTO score to </= 73% limited to decrease perceived limitation with mobility.   2. Pt will reduce worst pain to 1/10 in order to progress functional deficits  3. Pt will have hip strength greater than 90% of contralateral lower extremity in on  order to return to previous level of function  4. Pt will have full hip range of motion compared to contralateral lower extremity in order to return to previous level of function.        Plan     Continue with plan of care as indicated    Audrey Joseph, PT

## 2023-12-07 ENCOUNTER — OFFICE VISIT (OUTPATIENT)
Dept: SPORTS MEDICINE | Facility: CLINIC | Age: 23
End: 2023-12-07
Payer: OTHER GOVERNMENT

## 2023-12-07 VITALS
HEIGHT: 66 IN | SYSTOLIC BLOOD PRESSURE: 112 MMHG | WEIGHT: 132 LBS | BODY MASS INDEX: 21.21 KG/M2 | HEART RATE: 67 BPM | DIASTOLIC BLOOD PRESSURE: 67 MMHG

## 2023-12-07 DIAGNOSIS — Z98.890 S/P HIP ARTHROSCOPY: Primary | ICD-10-CM

## 2023-12-07 DIAGNOSIS — S73.191D TEAR OF RIGHT ACETABULAR LABRUM, SUBSEQUENT ENCOUNTER: ICD-10-CM

## 2023-12-07 DIAGNOSIS — M25.851 FEMOROACETABULAR IMPINGEMENT OF RIGHT HIP: ICD-10-CM

## 2023-12-07 PROCEDURE — 99999 PR PBB SHADOW E&M-EST. PATIENT-LVL III: ICD-10-PCS | Mod: PBBFAC,,, | Performed by: STUDENT IN AN ORGANIZED HEALTH CARE EDUCATION/TRAINING PROGRAM

## 2023-12-07 PROCEDURE — 99213 OFFICE O/P EST LOW 20 MIN: CPT | Mod: PBBFAC | Performed by: STUDENT IN AN ORGANIZED HEALTH CARE EDUCATION/TRAINING PROGRAM

## 2023-12-07 PROCEDURE — 99024 POSTOP FOLLOW-UP VISIT: CPT | Mod: ,,, | Performed by: STUDENT IN AN ORGANIZED HEALTH CARE EDUCATION/TRAINING PROGRAM

## 2023-12-07 PROCEDURE — 99999 PR PBB SHADOW E&M-EST. PATIENT-LVL III: CPT | Mod: PBBFAC,,, | Performed by: STUDENT IN AN ORGANIZED HEALTH CARE EDUCATION/TRAINING PROGRAM

## 2023-12-07 PROCEDURE — 99024 PR POST-OP FOLLOW-UP VISIT: ICD-10-PCS | Mod: ,,, | Performed by: STUDENT IN AN ORGANIZED HEALTH CARE EDUCATION/TRAINING PROGRAM

## 2023-12-07 NOTE — PROGRESS NOTES
Subjective:     Chief Complaint: Ewa Welch is a 23 y.o. female who had concerns including Post-op Evaluation of the Right Hip.    HPI    Ewa Welch is a 23 y.o. female presents status post below.  Overall she was doing well.  She is no real pain in the hip.  She does note some tightness mainly in the hip flexors and adductors when she goes into a figure 4 position to put on her shoes or pains.  This is manageable.  They have been working on this in physical therapy and she does feel relief after stretching the adductors.  Denies any numbness or paresthesias.  Overall, she is pleased with her progress.    PROCEDURE PERFORMED by Leonard Becker MD on 09/15/2023:   Right arthroscopic acetabular labral repair  Right arthroscopic osteoplasty of cam lesion  Right arthroscopic capsular plication    Review of Systems   Constitutional: Negative.   HENT: Negative.     Eyes: Negative.    Cardiovascular: Negative.    Respiratory: Negative.     Endocrine: Negative.    Hematologic/Lymphatic: Negative.    Skin: Negative.    Musculoskeletal:  Positive for muscle weakness. Negative for falls, joint pain and joint swelling.   Neurological: Negative.    Psychiatric/Behavioral: Negative.     Allergic/Immunologic: Negative.          Pain Related Questions  Over the past 3 days, what was your average pain during activity? (I.e. running, jogging, walking, climbing stairs, getting dressed, ect.): 1  Over the past 3 days, what was your highest pain level?: 2  Over the past 3 days, what was your lowest pain level? : 1    Other  How many nights a week are you awakened by your affected body part?: 0  Was the patient's HEIGHT measured or patient reported?: Patient Reported  Was the patient's WEIGHT measured or patient reported?: Measured    Objective:     General: Ewa is well-developed, well-nourished, appears stated age, in no acute distress, alert and oriented to time, place and person.     General    Nursing note and vitals  reviewed.  Constitutional: She is oriented to person, place, and time. She appears well-developed and well-nourished. No distress.   HENT:   Head: Normocephalic and atraumatic.   Nose: Nose normal.   Eyes: EOM are normal.   Cardiovascular:  Intact distal pulses.            Pulmonary/Chest: Effort normal. No respiratory distress.   Neurological: She is alert and oriented to person, place, and time.   Psychiatric: She has a normal mood and affect. Her behavior is normal. Judgment and thought content normal.           Right Knee Exam     Inspection   Alignment:  normal  Effusion: absent    Right Hip Exam     Inspection   Scars: present  Swelling: absent  Bruising: absent  No deformity of hip.  Quadriceps Atrophy:  Negative  Erythema: absent    Tests   Pain w/ forced internal rotation (LIZETTE): absent  Pain w/ forced external rotation (FADIR): absent  Cynthia: negative  Stinchfield test: negative    Other   Sensation: normal    Comments:  Incision sites healing well, without signs of erythema, infection, or wound dehiscence.    Range of motion flexion 110/internal rotation 20      Vascular Exam     Right Pulses  Dorsalis Pedis:      2+  Posterior Tibial:      2+        Edema  Right Upper Leg: absent          Assessment:   Ewa Welch is a 23 y.o. female status post above and doing well.  Encounter Diagnoses   Name Primary?    S/P hip arthroscopy Yes    Tear of right acetabular labrum, subsequent encounter     Femoroacetabular impingement of right hip           Plan:     She continues to do well.  Continue physical therapy and advance per protocol as tolerated.  We will add soft tissue modalities and stretching for the hip flexors and adductors, this can also include dry needling.  Return to clinic in 2 months.      All of the patient's questions were answered. Patient was advised to call the clinic or contact me through the patient portal for any questions or concerns.         Patient questionnaires may have been  collected.

## 2023-12-11 ENCOUNTER — CLINICAL SUPPORT (OUTPATIENT)
Dept: REHABILITATION | Facility: HOSPITAL | Age: 23
End: 2023-12-11
Payer: OTHER GOVERNMENT

## 2023-12-11 DIAGNOSIS — R53.1 DECREASED STRENGTH: ICD-10-CM

## 2023-12-11 DIAGNOSIS — R26.9 ABNORMAL GAIT: Primary | ICD-10-CM

## 2023-12-11 DIAGNOSIS — M25.60 DECREASED RANGE OF MOTION: ICD-10-CM

## 2023-12-11 PROCEDURE — 97110 THERAPEUTIC EXERCISES: CPT | Mod: PN

## 2023-12-11 PROCEDURE — 97530 THERAPEUTIC ACTIVITIES: CPT | Mod: PN

## 2023-12-11 PROCEDURE — 97112 NEUROMUSCULAR REEDUCATION: CPT | Mod: PN

## 2023-12-11 PROCEDURE — 97140 MANUAL THERAPY 1/> REGIONS: CPT | Mod: PN

## 2023-12-11 NOTE — PROGRESS NOTES
[facility]   Physical Therapy Treatment Note      Name: Ewa Welch  [facility] Number: [id number]    Therapy Diagnosis:   Encounter Diagnoses   Name Primary?    Abnormal gait Yes    Decreased range of motion     Decreased strength        Physician: Milton Hogan*    Visit Date: 12/11/2023      Therapy Diagnosis:        Encounter Diagnoses   Name Primary?    Tear of right acetabular labrum, initial encounter      Femoroacetabular impingement of right hip      Abnormal gait      Decreased range of motion      Decreased strength          Physician: Milton Hogan*     Physician Orders: PT Eval and Treat   Medical Diagnosis from Referral: S73.191A (ICD-10-CM) - Tear of right acetabular labrum, initial encounter M25.851 (ICD-10-CM) - Femoroacetabular impingement of right hip  Evaluation Date: 9/18/2023  Authorization Period Expiration: 12/31/2023  Plan of Care Expiration: 1/16/2024  Progress Note Due: 12/18/2023  Visit # / Visits authorized: 21/40 + eval  FOTO: 5/5     Time In: 7:06 am  Time Out: 8:00 am  Total Appointment Time (timed & untimed codes): 54 minutes      Precautions: Standard   DOC 9/15/2023  PROCEDURE PERFORMED:   Right arthroscopic acetabular labral repair  Right arthroscopic osteoplasty of cam lesion  Right arthroscopic capsular plication     She will follow the hip arthroscopy with acetabular labral repair and osteoplasty protocol.  Return to clinic in 2 weeks.  Subjective     Patient reports: Saw surgeon, was told it all looks good. She did tell him about the hip pain when doing external rotation, was told it could be the same thing PT was telling her about. She felt really good after that last visit.      She was compliant with home exercise program.  Response to previous treatment: sore  Functional change: NA     Pain: 0/10  Location: right hip - only have discomfort if she moves a certain way    Objective      Objective Measures updated at progress  "report unless specified.     Special tests:  + LIZETTE - pain and reduced motion on Right     Joint mobility:  Hip IR full, ER she has hesitation and pain    Treatment     Ewa received the treatments listed below:      Ewa received the following manual therapy techniques: Joint mobilizations were applied to the: right hip for 12 minutes, including:    Long axis distraction mobilization and manipulation  Lateral hip distraction - great pain relief - with IR/ER    Gentle circumduction mobilization     Ewa participated in neuromuscular re-education activities to improve: Coordination, Sense, Proprioception, and Posture for 19 minutes. The following activities were included:    Single leg bridge on bench B 3x12   Supine bridge with Ham curl green ball 3x15  Clamshell at wall GTB 3x10 B  Plank on elbows hold [id number]    Not today:  Single leg balance [id number]   Single leg dynamic balance forward reaches [id number]   Tripod hip ext 3x10 right  Glute bridge [id number] green theraband thighs +10#  Hamstring isometrics [id number]   Quad sets 2x20x5"  Hooklying hip add c blue ball [id number]    Ewa received therapeutic exercises to develop strength, endurance, ROM, flexibility, and posture for 12 minutes including:    Lateral Monster walk Yellow theraloop 3 laps  Side lunge/back lunge with slider 4x5      Not today:  Prone HS Curls 3x10 red theraband  Single leg shuttle 50# 2x15  Step up 12" step  Standing internal rotation/external rotation on stool 3x10  DBL Shuttle Press 2 black bands 2x10  Standing hip Abd c internal rotation 3x10  Standing hip ext 3x10  Hand heel rocking gentle not through pan x10  DKTC w/ towel 10x 10sec hold   Prone Hip Ext 2x10    Ewa participated in dynamic functional therapeutic activities to improve functional performance for 15 minutes, including:  Upright Bike 8min lv 3  TRX single leg squat to bench double leg stand 3x12     Standing double limb 50% tolerance    Ewa participated in gait " training to improve functional mobility and safety for 00 minutes, including:  Ambulation increased to single axillary crutch. Proper gait cued throughout. Pt can ambulate with no pain but does continue to demonstrate abnormal mechanics.      Home Exercises and Patient Education Provided     Education provided:   - Pt educated on standing lateral hip distraction with band    Written Home Exercises Provided: continue with previous HEP.  Exercises were reviewed and Ewa was able to demonstrate them prior to the end of the session.  Ewa demonstrated good  understanding of the education provided.      See EMR under Patient Instructions for exercises provided 9/18/2023.     Assessment   Ewa presented to PT reports of improvement with manual performed last visit. Repeated manual to improve joint mobility into ER/IR. PT continues to challenge hip strength. Pt would like to return to run, PT to perform some strength and return to run testing next visit.     Plan of care discussed with patient: Continue with previous HEP  Patient's spiritual, cultural and educational needs considered and patient is agreeable to the plan of care and goals as stated below:      Anticipated Barriers for therapy: none    Goals:   Short Term Goals (8 Weeks):  1. Pt will be compliant with initial HEP to supplement PT in restoring pain free function.  2. Pt will reduce worst pain to 4/10 in order to progress functional deficits  3. Pt will ambulate 1mile without pain in order to return to previous level of function  4. Pt will improve hip flex strength to 4/5 in order to improve functional mobility      Long Term Goals (16 Weeks):  1. Pt will improve FOTO score to </= 73% limited to decrease perceived limitation with mobility.   2. Pt will reduce worst pain to 1/10 in order to progress functional deficits  3. Pt will have hip strength greater than 90% of contralateral lower extremity in on order to return to previous level of function  4.  Pt will have full hip range of motion compared to contralateral lower extremity in order to return to previous level of function.        Plan     Continue with plan of care as indicated    Audrey Joseph, PT

## 2023-12-14 ENCOUNTER — CLINICAL SUPPORT (OUTPATIENT)
Dept: REHABILITATION | Facility: HOSPITAL | Age: 23
End: 2023-12-14
Payer: OTHER GOVERNMENT

## 2023-12-14 DIAGNOSIS — R26.9 ABNORMAL GAIT: Primary | ICD-10-CM

## 2023-12-14 DIAGNOSIS — R53.1 DECREASED STRENGTH: ICD-10-CM

## 2023-12-14 DIAGNOSIS — M25.60 DECREASED RANGE OF MOTION: ICD-10-CM

## 2023-12-14 PROCEDURE — 97530 THERAPEUTIC ACTIVITIES: CPT | Mod: PN

## 2023-12-14 NOTE — PROGRESS NOTES
[facility]   Physical Therapy Treatment Note      Name: Ewa Welch  [facility] Number: [id number]    Therapy Diagnosis:   No diagnosis found.      Physician: Milton Hogan*    Visit Date: 12/14/2023      Therapy Diagnosis:        Encounter Diagnoses   Name Primary?    Tear of right acetabular labrum, initial encounter      Femoroacetabular impingement of right hip      Abnormal gait      Decreased range of motion      Decreased strength          Physician: Milton Hogan*     Physician Orders: PT Eval and Treat   Medical Diagnosis from Referral: S73.191A (ICD-10-CM) - Tear of right acetabular labrum, initial encounter M25.851 (ICD-10-CM) - Femoroacetabular impingement of right hip  Evaluation Date: 9/18/2023  Authorization Period Expiration: 12/31/2023  Plan of Care Expiration: 1/16/2024  Progress Note Due: 12/18/2023  Visit # / Visits authorized: 21/40 + eval  FOTO: 5/5     Time In: 7:06 am  Time Out: 8:00 am  Total Appointment Time (timed & untimed codes): 54 minutes      Precautions: Standard   DOC 9/15/2023  PROCEDURE PERFORMED:   Right arthroscopic acetabular labral repair  Right arthroscopic osteoplasty of cam lesion  Right arthroscopic capsular plication     She will follow the hip arthroscopy with acetabular labral repair and osteoplasty protocol.  Return to clinic in 2 weeks.  Subjective     Patient reports: Saw surgeon, was told it all looks good. She did tell him about the hip pain when doing external rotation, was told it could be the same thing PT was telling her about. She felt really good after that last visit.      She was compliant with home exercise program.  Response to previous treatment: sore  Functional change: NA     Pain: 0/10  Location: right hip - only have discomfort if she moves a certain way    Objective      Objective Measures updated at progress report unless specified.     Special tests:  + LIZETTE - pain and reduced motion on Right  "    Joint mobility:  Hip IR full, ER she has hesitation and pain    Treatment     Ewa received the treatments listed below:      Ewa received the following manual therapy techniques: Joint mobilizations were applied to the: right hip for 12 minutes, including:    Long axis distraction mobilization and manipulation  Lateral hip distraction - great pain relief - with IR/ER    Gentle circumduction mobilization     Ewa participated in neuromuscular re-education activities to improve: Coordination, Sense, Proprioception, and Posture for 19 minutes. The following activities were included:    Single leg bridge on bench B 3x12   Supine bridge with Ham curl green ball 3x15  Clamshell at wall GTB 3x10 B  Plank on elbows hold [id number]    Not today:  Single leg balance [id number]   Single leg dynamic balance forward reaches [id number]   Tripod hip ext 3x10 right  Glute bridge [id number] green theraband thighs +10#  Hamstring isometrics [id number]   Quad sets 2x20x5"  Hooklying hip add c blue ball [id number]    Ewa received therapeutic exercises to develop strength, endurance, ROM, flexibility, and posture for 12 minutes including:    Lateral Monster walk Yellow theraloop 3 laps  Side lunge/back lunge with slider 4x5      Not today:  Prone HS Curls 3x10 red theraband  Single leg shuttle 50# 2x15  Step up 12" step  Standing internal rotation/external rotation on stool 3x10  DBL Shuttle Press 2 black bands 2x10  Standing hip Abd c internal rotation 3x10  Standing hip ext 3x10  Hand heel rocking gentle not through pan x10  DKTC w/ towel 10x 10sec hold   Prone Hip Ext 2x10    Ewa participated in dynamic functional therapeutic activities to improve functional performance for 15 minutes, including:  Upright Bike 8min lv 3  TRX single leg squat to bench double leg stand 3x12     Standing double limb 50% tolerance    Ewa participated in gait training to improve functional mobility and safety for 00 minutes, " including:  Ambulation increased to single axillary crutch. Proper gait cued throughout. Pt can ambulate with no pain but does continue to demonstrate abnormal mechanics.      Home Exercises and Patient Education Provided     Education provided:   - Pt educated on standing lateral hip distraction with band    Written Home Exercises Provided: continue with previous HEP.  Exercises were reviewed and Ewa was able to demonstrate them prior to the end of the session.  Ewa demonstrated good  understanding of the education provided.      See EMR under Patient Instructions for exercises provided 9/18/2023.     Assessment   Ewa presented to PT reports of improvement with manual performed last visit. Repeated manual to improve joint mobility into ER/IR. PT continues to challenge hip strength. Pt would like to return to run, PT to perform some strength and return to run testing next visit.     Plan of care discussed with patient: Continue with previous HEP  Patient's spiritual, cultural and educational needs considered and patient is agreeable to the plan of care and goals as stated below:      Anticipated Barriers for therapy: none    Goals:   Short Term Goals (8 Weeks):  1. Pt will be compliant with initial HEP to supplement PT in restoring pain free function.  2. Pt will reduce worst pain to 4/10 in order to progress functional deficits  3. Pt will ambulate 1mile without pain in order to return to previous level of function  4. Pt will improve hip flex strength to 4/5 in order to improve functional mobility      Long Term Goals (16 Weeks):  1. Pt will improve FOTO score to </= 73% limited to decrease perceived limitation with mobility.   2. Pt will reduce worst pain to 1/10 in order to progress functional deficits  3. Pt will have hip strength greater than 90% of contralateral lower extremity in on order to return to previous level of function  4. Pt will have full hip range of motion compared to contralateral  lower extremity in order to return to previous level of function.        Plan     Continue with plan of care as indicated    Ashlee Conklin, PT   Co-treated by Audrey Joseph PT, DPT, O[facility]

## 2023-12-14 NOTE — PROGRESS NOTES
[facility]   Physical Therapy Treatment Note/Progress Note     Name: Ewa Welch  [facility] Number: [id number]    Therapy Diagnosis:   Encounter Diagnoses   Name Primary?    Abnormal gait Yes    Decreased range of motion     Decreased strength          Physician: Milton Hogan*    Visit Date: 2023      Therapy Diagnosis:        Encounter Diagnoses   Name Primary?    Tear of right acetabular labrum, initial encounter      Femoroacetabular impingement of right hip      Abnormal gait      Decreased range of motion      Decreased strength          Physician: Milton oHgan*     Physician Orders: PT Eval and Treat   Medical Diagnosis from Referral: S73.191A (ICD-10-CM) - Tear of right acetabular labrum, initial encounter M25.851 (ICD-10-CM) - Femoroacetabular impingement of right hip  Evaluation Date: 2023  Authorization Period Expiration: 2023  Plan of Care Expiration: 2024  Progress Note Due: 2024  Visit # / Visits authorized:  + eval  FOTO:      Time In: 8:10am  Time Out: 9:05am  Total Appointment Time (timed & untimed codes): 55 minutes      Precautions: Standard   DOC 9/15/2023  PROCEDURE PERFORMED:   Right arthroscopic acetabular labral repair  Right arthroscopic osteoplasty of cam lesion  Right arthroscopic capsular plication     She will follow the hip arthroscopy with acetabular labral repair and osteoplasty protocol.  Return to clinic in 2 weeks.  Subjective     Patient reports: Feels good, ready to be tested.      She was compliant with home exercise program.  Response to previous treatment: sore  Functional change: NA     Pain: 0/10  Location: right hip - only have discomfort if she moves a certain way    Objective      Objective Measures updated at progress report unless specified.     2023  L/E Strength w/ MicroFET Muscle Marybel Dynamometer Right Left LSI   Hip Flexion/hooklying 7.9 kg  7.2  7.9  AV.7  10.5  "kg  7.7  9.8  AVG 9.4  82%   Hip Abduction 10.2 kg  10.9  11.8   AVG:10.9 10.6 kg  11.2  11.9   AV.2 97%   Hip ext 10.1  9.3  11.4  AVG:10.3 13.6  12.7  13.6  AV.3 77%   Hip IR 5.5  7.9  7.3  AV.9 7.1  8.5  9.3  AV.3 83%   Hip ER 7.8  6.5  7.8  AV.4 5.1  8.4  8.5  AV.33 101%   Quadriceps 16.1 kg  16.6  16.9   AVG:15.6 22.2 kg   22.9  19.9  AV.7 72%   Hamstrings 9.6 kg  8.72  11.4   AV.9 8.4 kg  9.8  11.6   AV.9 100%         Treatment     Ewa received the treatments listed below:      Ewa received the following manual therapy techniques: Joint mobilizations were applied to the: right hip for 00 minutes, including:    Long axis distraction mobilization and manipulation  Lateral hip distraction - great pain relief - with IR/ER    Gentle circumduction mobilization     Ewa participated in neuromuscular re-education activities to improve: Coordination, Sense, Proprioception, and Posture for 00 minutes. The following activities were included:    Single leg bridge on bench B 3x12   Supine bridge with Ham curl green ball 3x15  Clamshell at wall GTB 3x10 B  Plank on elbows hold [id number]    Not today:  Single leg balance [id number]   Single leg dynamic balance forward reaches [id number]   Tripod hip ext 3x10 right  Glute bridge [id number] green theraband thighs +10#  Hamstring isometrics [id number]   Quad sets 2x20x5"  Hooklying hip add c blue ball [id number]    Ewa received therapeutic exercises to develop strength, endurance, ROM, flexibility, and posture for 00 minutes including:    Lateral Monster walk Yellow theraloop 3 laps  Side lunge/back lunge with slider 4x5      Not today:  Prone HS Curls 3x10 red theraband  Single leg shuttle 50# 2x15  Step up 12" step  Standing internal rotation/external rotation on stool 3x10  DBL Shuttle Press 2 black bands 2x10  Standing hip Abd c internal rotation 3x10  Standing hip ext 3x10  Hand heel rocking gentle not through pan x10  DKTC w/ towel 10x " 10sec hold   Prone Hip Ext 2x10    Ewa participated in dynamic functional therapeutic activities to improve functional performance for 55 minutes, including:  Assessment made above  Upright Bike 8min lv 3  Double leg hop in place 3x30  Double leg hop Forward/backward 3x30  Double leg hop lat 3x30       Standing double limb 50% tolerance  TRX single leg squat to bench double leg stand 3x12    Ewa participated in gait training to improve functional mobility and safety for 00 minutes, including:  Ambulation increased to single axillary crutch. Proper gait cued throughout. Pt can ambulate with no pain but does continue to demonstrate abnormal mechanics.      Home Exercises and Patient Education Provided     Education provided:   - Pt educated on standing lateral hip distraction with band    Written Home Exercises Provided: Yes, adding hopping double leg to home program  Exercises were reviewed and Ewa was able to demonstrate them prior to the end of the session.  Ewa demonstrated good  understanding of the education provided.      See EMR under Patient Instructions for exercises provided 9/18/2023.     Assessment   Ewa was assessed for improvement in strength today. Based on assessment, her current primary limitations are in quads and hip extenders. She demonstrates an LSI safe to begin hop testing. Based on response to this, continued progression towards return to run will being. PT to continue to progress strength.     Plan of care discussed with patient: Continue with previous HEP  Patient's spiritual, cultural and educational needs considered and patient is agreeable to the plan of care and goals as stated below:      Anticipated Barriers for therapy: none    Goals:   Short Term Goals (8 Weeks):  1. Pt will be compliant with initial HEP to supplement PT in restoring pain free function.  2. Pt will reduce worst pain to 4/10 in order to progress functional deficits  3. Pt will ambulate 1mile without  pain in order to return to previous level of function  4. Pt will improve hip flex strength to 4/5 in order to improve functional mobility      Long Term Goals (16 Weeks):  1. Pt will improve FOTO score to </= 73% limited to decrease perceived limitation with mobility.   2. Pt will reduce worst pain to 1/10 in order to progress functional deficits  3. Pt will have hip strength greater than 90% of contralateral lower extremity in on order to return to previous level of function  4. Pt will have full hip range of motion compared to contralateral lower extremity in order to return to previous level of function.        Plan     Continue with plan of care as indicated    Audrey Joseph, PT

## 2023-12-17 NOTE — PROGRESS NOTES
[facility]   Physical Therapy Treatment Note/Progress Note     Name: Ewa Welch  [facility] Number: [id number]    Therapy Diagnosis:   No diagnosis found.        Physician: Milton Hogan*    Visit Date: 2023      Therapy Diagnosis:        Encounter Diagnoses   Name Primary?    Tear of right acetabular labrum, initial encounter      Femoroacetabular impingement of right hip      Abnormal gait      Decreased range of motion      Decreased strength          Physician: Milton Hogan*     Physician Orders: PT Eval and Treat   Medical Diagnosis from Referral: S73.191A (ICD-10-CM) - Tear of right acetabular labrum, initial encounter M25.851 (ICD-10-CM) - Femoroacetabular impingement of right hip  Evaluation Date: 2023  Authorization Period Expiration: 2023  Plan of Care Expiration: 2024  Progress Note Due: 2024  Visit # / Visits authorized:  + eval  FOTO:      Time In: 8:10am  Time Out: 9:05am  Total Appointment Time (timed & untimed codes): 55 minutes      Precautions: Standard   DOC 9/15/2023  PROCEDURE PERFORMED:   Right arthroscopic acetabular labral repair  Right arthroscopic osteoplasty of cam lesion  Right arthroscopic capsular plication     She will follow the hip arthroscopy with acetabular labral repair and osteoplasty protocol.  Return to clinic in 2 weeks.  Subjective     Patient reports: Feels good, ready to be tested.      She was compliant with home exercise program.  Response to previous treatment: sore  Functional change: NA     Pain: 0/10  Location: right hip - only have discomfort if she moves a certain way    Objective      Objective Measures updated at progress report unless specified.     2023  L/E Strength w/ MicroFET Muscle Marybel Dynamometer Right Left LSI   Hip Flexion/hooklying 7.9 kg  7.2  7.9  AV.7  10.5 kg  7.7  9.8  AVG 9.4  82%   Hip Abduction 10.2 kg  10.9  11.8   AVG:10.9 10.6 kg  11.2  11.9   AVG:  "11.2 97%   Hip ext 10.1  9.3  11.4  AVG:10.3 13.6  12.7  13.6  AV.3 77%   Hip IR 5.5  7.9  7.3  AV.9 7.1  8.5  9.3  AV.3 83%   Hip ER 7.8  6.5  7.8  AV.4 5.1  8.4  8.5  AV.33 101%   Quadriceps 16.1 kg  16.6  16.9   AVG:15.6 22.2 kg   22.9  19.9  AV.7 72%   Hamstrings 9.6 kg  8.72  11.4   AV.9 8.4 kg  9.8  11.6   AV.9 100%         Treatment     Ewa received the treatments listed below:      Ewa received the following manual therapy techniques: Joint mobilizations were applied to the: right hip for 00 minutes, including:    Long axis distraction mobilization and manipulation  Lateral hip distraction - great pain relief - with IR/ER    Gentle circumduction mobilization     Ewa participated in neuromuscular re-education activities to improve: Coordination, Sense, Proprioception, and Posture for 00 minutes. The following activities were included:    Single leg bridge on bench B 3x12   Supine bridge with Ham curl green ball 3x15  Clamshell at wall GTB 3x10 B  Plank on elbows hold [id number]    Not today:  Single leg balance [id number]   Single leg dynamic balance forward reaches [id number]   Tripod hip ext 3x10 right  Glute bridge [id number] green theraband thighs +10#  Hamstring isometrics [id number]   Quad sets 2x20x5"  Hooklying hip add c blue ball [id number]    Ewa received therapeutic exercises to develop strength, endurance, ROM, flexibility, and posture for 00 minutes including:    Lateral Monster walk Yellow theraloop 3 laps  Side lunge/back lunge with slider 4x5      Not today:  Prone HS Curls 3x10 red theraband  Single leg shuttle 50# 2x15  Step up 12" step  Standing internal rotation/external rotation on stool 3x10  DBL Shuttle Press 2 black bands 2x10  Standing hip Abd c internal rotation 3x10  Standing hip ext 3x10  Hand heel rocking gentle not through pan x10  DKTC w/ towel 10x 10sec hold   Prone Hip Ext 2x10    Ewa participated in dynamic functional therapeutic activities to " improve functional performance for 55 minutes, including:  Assessment made above  Upright Bike 8min lv 3  Double leg hop in place 3x30  Double leg hop Forward/backward 3x30  Double leg hop lat 3x30       Standing double limb 50% tolerance  TRX single leg squat to bench double leg stand 3x12    Ewa participated in gait training to improve functional mobility and safety for 00 minutes, including:  Ambulation increased to single axillary crutch. Proper gait cued throughout. Pt can ambulate with no pain but does continue to demonstrate abnormal mechanics.      Home Exercises and Patient Education Provided     Education provided:   - Pt educated on standing lateral hip distraction with band    Written Home Exercises Provided: Yes, adding hopping double leg to home program  Exercises were reviewed and Ewa was able to demonstrate them prior to the end of the session.  Ewa demonstrated good  understanding of the education provided.      See EMR under Patient Instructions for exercises provided 9/18/2023.     Assessment   Ewa was assessed for improvement in strength today. Based on assessment, her current primary limitations are in quads and hip extenders. She demonstrates an LSI safe to begin hop testing. Based on response to this, continued progression towards return to run will being. PT to continue to progress strength.     Plan of care discussed with patient: Continue with previous HEP  Patient's spiritual, cultural and educational needs considered and patient is agreeable to the plan of care and goals as stated below:      Anticipated Barriers for therapy: none    Goals:   Short Term Goals (8 Weeks):  1. Pt will be compliant with initial HEP to supplement PT in restoring pain free function.  2. Pt will reduce worst pain to 4/10 in order to progress functional deficits  3. Pt will ambulate 1mile without pain in order to return to previous level of function  4. Pt will improve hip flex strength to 4/5 in  order to improve functional mobility      Long Term Goals (16 Weeks):  1. Pt will improve FOTO score to </= 73% limited to decrease perceived limitation with mobility.   2. Pt will reduce worst pain to 1/10 in order to progress functional deficits  3. Pt will have hip strength greater than 90% of contralateral lower extremity in on order to return to previous level of function  4. Pt will have full hip range of motion compared to contralateral lower extremity in order to return to previous level of function.        Plan     Continue with plan of care as indicated    Jennifer YESENIA Funez

## 2023-12-18 ENCOUNTER — CLINICAL SUPPORT (OUTPATIENT)
Dept: REHABILITATION | Facility: HOSPITAL | Age: 23
End: 2023-12-18
Payer: OTHER GOVERNMENT

## 2023-12-18 DIAGNOSIS — R53.1 DECREASED STRENGTH: ICD-10-CM

## 2023-12-18 DIAGNOSIS — R26.9 ABNORMAL GAIT: Primary | ICD-10-CM

## 2023-12-18 DIAGNOSIS — M25.60 DECREASED RANGE OF MOTION: ICD-10-CM

## 2023-12-18 PROCEDURE — 97530 THERAPEUTIC ACTIVITIES: CPT | Mod: PN

## 2023-12-18 NOTE — PROGRESS NOTES
[facility]   Physical Therapy Treatment Note/Progress Note     Name: Ewa Welch  [facility] Number: [id number]    Therapy Diagnosis:   Encounter Diagnoses   Name Primary?    Abnormal gait Yes    Decreased range of motion     Decreased strength        Physician: Milton Hogan*    Visit Date: 12/18/2023      Therapy Diagnosis:        Encounter Diagnoses   Name Primary?    Tear of right acetabular labrum, initial encounter      Femoroacetabular impingement of right hip      Abnormal gait      Decreased range of motion      Decreased strength          Physician: Milton Hogan*     Physician Orders: PT Eval and Treat   Medical Diagnosis from Referral: S73.191A (ICD-10-CM) - Tear of right acetabular labrum, initial encounter M25.851 (ICD-10-CM) - Femoroacetabular impingement of right hip  Evaluation Date: 9/18/2023  Authorization Period Expiration: 12/31/2023  Plan of Care Expiration: 1/16/2024  Progress Note Due: 1/18/2024  Visit # / Visits authorized: 23/40 + eval  FOTO: 5/5     Time In: 8:03am  Time Out: 9:00am  Total Appointment Time (timed & untimed codes): 57 minutes      Precautions: Standard   DOC 9/15/2023  PROCEDURE PERFORMED:   Right arthroscopic acetabular labral repair  Right arthroscopic osteoplasty of cam lesion  Right arthroscopic capsular plication     She will follow the hip arthroscopy with acetabular labral repair and osteoplasty protocol.  Return to clinic in 2 weeks.  Subjective     Patient reports: had no pain after last session but did feel like she had more motion and had to remind herself not to over extend it. She did feel some pinching when going up/down stairs.     She was compliant with home exercise program.  Response to previous treatment: sore  Functional change: NA     Pain: 0/10  Location: right hip - only have discomfort if she moves a certain way    Objective      Objective Measures updated at progress report unless specified.  "    2023  L/E Strength w/ MicroFET Muscle Marybel Dynamometer Right Left LSI   Hip Flexion/hooklying 7.9 kg  7.2  7.9  AV.7  10.5 kg  7.7  9.8  AVG 9.4  82%   Hip Abduction 10.2 kg  10.9  11.8   AVG:10.9 10.6 kg  11.2  11.9   AV.2 97%   Hip ext 10.1  9.3  11.4  AVG:10.3 13.6  12.7  13.6  AV.3 77%   Hip IR 5.5  7.9  7.3  AV.9 7.1  8.5  9.3  AV.3 83%   Hip ER 7.8  6.5  7.8  AV.4 5.1  8.4  8.5  AV.33 101%   Quadriceps 16.1 kg  16.6  16.9   AVG:15.6 22.2 kg   22.9  19.9  AV.7 72%   Hamstrings 9.6 kg  8.72  11.4   AV.9 8.4 kg  9.8  11.6   AV.9 100%         Treatment     Ewa received the treatments listed below:      Ewa received the following manual therapy techniques: Joint mobilizations were applied to the: right hip for 00 minutes, including:    Long axis distraction mobilization and manipulation  Lateral hip distraction - great pain relief - with IR/ER    Gentle circumduction mobilization     Ewa participated in neuromuscular re-education activities to improve: Coordination, Sense, Proprioception, and Posture for 00 minutes. The following activities were included:      Single leg bridge on bench B 3x12   Supine bridge with Ham curl green ball 3x15  Clamshell at wall GTB 3x10 B  Plank on elbows hold [id number]    Not today:  Single leg balance [id number]   Single leg dynamic balance forward reaches [id number]   Tripod hip ext 3x10 right  Glute bridge [id number] green theraband thighs +10#  Hamstring isometrics [id number]   Quad sets 2x20x5"  Hooklying hip add c blue ball [id number]    Ewa received therapeutic exercises to develop strength, endurance, ROM, flexibility, and posture for 00 minutes including:    Lateral Monster walk Yellow theraloop 3 laps  Side lunge/back lunge with slider 4x5      Not today:  Prone HS Curls 3x10 red theraband  Single leg shuttle 50# 2x15  Step up 12" step  Standing internal rotation/external rotation on stool 3x10  DBL Shuttle Press 2 black " bands 2x10  Standing hip Abd c internal rotation 3x10  Standing hip ext 3x10  Hand heel rocking gentle not through pan x10  DKTC w/ towel 10x 10sec hold   Prone Hip Ext 2x10    Ewa participated in dynamic functional therapeutic activities to improve functional performance for 57 minutes, including:  Upright Bike 8min lv 3  Matrix knee ext double leg extends SINGLE LEG eccentric lower 15# 3x12  Leg Press 25# single leg 3x8  Alter G Treadmill - 5min walk, 1min jog, 3min walk and 1min jog x3 - 80% BW support    Assessment made above  Double leg hop in place 3x30  Double leg hop Forward/backward 3x30  Double leg hop lat 3x30  Standing double limb 50% tolerance  TRX single leg squat to bench double leg stand 3x12    Ewa participated in gait training to improve functional mobility and safety for 00 minutes, including:  Ambulation increased to single axillary crutch. Proper gait cued throughout. Pt can ambulate with no pain but does continue to demonstrate abnormal mechanics.      Home Exercises and Patient Education Provided     Education provided:   - Pt educated on standing lateral hip distraction with band    Written Home Exercises Provided: Yes, adding hopping double leg to home program  Exercises were reviewed and Ewa was able to demonstrate them prior to the end of the session.  Ewa demonstrated good  understanding of the education provided.      See EMR under Patient Instructions for exercises provided 9/18/2023.     Assessment   Ewa presents to PT today with no adverse reaction to previous tx. Due to improvement in strength, progression of return to run was performed today with body weight supported treadmill. Progression to overground/normal treadmill running to begin when >90% LSI for all  LE tests and single leg hop without pain. PT to monitor response to body weight support.     Plan of care discussed with patient: Continue with previous HEP  Patient's spiritual, cultural and educational needs  considered and patient is agreeable to the plan of care and goals as stated below:      Anticipated Barriers for therapy: none    Goals:   Short Term Goals (8 Weeks):  1. Pt will be compliant with initial HEP to supplement PT in restoring pain free function.  2. Pt will reduce worst pain to 4/10 in order to progress functional deficits  3. Pt will ambulate 1mile without pain in order to return to previous level of function  4. Pt will improve hip flex strength to 4/5 in order to improve functional mobility      Long Term Goals (16 Weeks):  1. Pt will improve FOTO score to </= 73% limited to decrease perceived limitation with mobility.   2. Pt will reduce worst pain to 1/10 in order to progress functional deficits  3. Pt will have hip strength greater than 90% of contralateral lower extremity in on order to return to previous level of function  4. Pt will have full hip range of motion compared to contralateral lower extremity in order to return to previous level of function.        Plan     Continue with plan of care as indicated    Audrey Joseph, PT

## 2023-12-21 ENCOUNTER — CLINICAL SUPPORT (OUTPATIENT)
Dept: REHABILITATION | Facility: HOSPITAL | Age: 23
End: 2023-12-21
Payer: OTHER GOVERNMENT

## 2023-12-21 DIAGNOSIS — R26.9 ABNORMAL GAIT: Primary | ICD-10-CM

## 2023-12-21 DIAGNOSIS — M25.60 DECREASED RANGE OF MOTION: ICD-10-CM

## 2023-12-21 DIAGNOSIS — R53.1 DECREASED STRENGTH: ICD-10-CM

## 2023-12-21 PROCEDURE — 97530 THERAPEUTIC ACTIVITIES: CPT | Mod: PN,CQ

## 2023-12-21 NOTE — PROGRESS NOTES
[facility]   Physical Therapy Treatment Note/Progress Note     Name: Ewa Welch  [facility] Number: [id number]    Therapy Diagnosis:   Encounter Diagnoses   Name Primary?    Abnormal gait Yes    Decreased range of motion     Decreased strength        Physician: Milton Hogan*    Visit Date: 12/21/2023      Therapy Diagnosis:        Encounter Diagnoses   Name Primary?    Tear of right acetabular labrum, initial encounter      Femoroacetabular impingement of right hip      Abnormal gait      Decreased range of motion      Decreased strength          Physician: Milton Hogan*     Physician Orders: PT Eval and Treat   Medical Diagnosis from Referral: S73.191A (ICD-10-CM) - Tear of right acetabular labrum, initial encounter M25.851 (ICD-10-CM) - Femoroacetabular impingement of right hip  Evaluation Date: 9/18/2023  Authorization Period Expiration: 12/31/2023  Plan of Care Expiration: 1/16/2024  Progress Note Due: 1/18/2024  Visit # / Visits authorized: 24/40 + eval  FOTO: 5/5     Time In: 8:06 am  Time Out: 9:00 am  Total Appointment Time (timed & untimed codes): 53 minutes      Precautions: Standard   DOC 9/15/2023  PROCEDURE PERFORMED:   Right arthroscopic acetabular labral repair  Right arthroscopic osteoplasty of cam lesion  Right arthroscopic capsular plication     She will follow the hip arthroscopy with acetabular labral repair and osteoplasty protocol.  Return to clinic in 2 weeks.  Subjective     Patient reports:She really liked the machine she got to use to run a bit.  She didn't have any pain or issues afterwards.  Her only complaint is sometimes an almost soreness in the front of her hip.    She was compliant with home exercise program.  Response to previous treatment: sore  Functional change: NA     Pain: 0/10  Location: right hip - only have discomfort if she moves a certain way    Objective      Objective Measures updated at progress report unless specified.  "    2023  L/E Strength w/ MicroFET Muscle Marybel Dynamometer Right Left LSI   Hip Flexion/hooklying 7.9 kg  7.2  7.9  AV.7  10.5 kg  7.7  9.8  AVG 9.4  82%   Hip Abduction 10.2 kg  10.9  11.8   AVG:10.9 10.6 kg  11.2  11.9   AV.2 97%   Hip ext 10.1  9.3  11.4  AVG:10.3 13.6  12.7  13.6  AV.3 77%   Hip IR 5.5  7.9  7.3  AV.9 7.1  8.5  9.3  AV.3 83%   Hip ER 7.8  6.5  7.8  AV.4 5.1  8.4  8.5  AV.33 101%   Quadriceps 16.1 kg  16.6  16.9   AVG:15.6 22.2 kg   22.9  19.9  AV.7 72%   Hamstrings 9.6 kg  8.72  11.4   AV.9 8.4 kg  9.8  11.6   AV.9 100%         Treatment     Ewa received the treatments listed below:      Ewa received the following manual therapy techniques: Joint mobilizations were applied to the: right hip for 00 minutes, including:  Long axis distraction mobilization and manipulation  Lateral hip distraction - great pain relief - with IR/ER  Gentle circumduction mobilization         Ewa participated in neuromuscular re-education activities to improve: Coordination, Sense, Proprioception, and Posture for 00 minutes. The following activities were included:  Single leg bridge on bench B 3x12   Supine bridge with Ham curl green ball 3x15  Clamshell at wall GTB 3x10 B  Plank on elbows hold [id number]    Not today:  Single leg balance [id number]   Single leg dynamic balance forward reaches [id number]   Tripod hip ext 3x10 right  Glute bridge [id number] green theraband thighs +10#  Hamstring isometrics [id number]   Quad sets 2x20x5"  Hooklying hip add c blue ball [id number]        Ewa received therapeutic exercises to develop strength, endurance, ROM, flexibility, and posture for 00 minutes including:  Lateral Monster walk Yellow theraloop 3 laps  Side lunge/back lunge with slider 4x5      Not today:  Prone HS Curls 3x10 red theraband  Single leg shuttle 50# 2x15  Step up 12" step  Standing internal rotation/external rotation on stool 3x10  DBL Shuttle Press 2 black bands " 2x10  Standing hip Abd c internal rotation 3x10  Standing hip ext 3x10  Hand heel rocking gentle not through pan x10  DKTC w/ towel 10x 10sec hold   Prone Hip Ext 2x10    Ewa participated in dynamic functional therapeutic activities to improve functional performance for 53 minutes, including:  Upright Bike 8min lv 3  Matrix knee ext double leg extends SINGLE LEG eccentric lower 15# 3x12  Leg Press 25# single leg 3x10  Alter G Treadmill - 5min walk, 1min jog, 3min walk and 1min jog x3 - 80% BW support    Assessment made above  Double leg hop in place 3x30  Double leg hop Forward/backward 3x30  Double leg hop lat 3x30  Standing double limb 50% tolerance  TRX single leg squat to bench double leg stand 3x12    Ewa participated in gait training to improve functional mobility and safety for 00 minutes, including:  Ambulation increased to single axillary crutch. Proper gait cued throughout. Pt can ambulate with no pain but does continue to demonstrate abnormal mechanics.      Home Exercises and Patient Education Provided     Education provided:   - Pt educated on standing lateral hip distraction with band    Written Home Exercises Provided: Yes, adding hopping double leg to home program  Exercises were reviewed and Ewa was able to demonstrate them prior to the end of the session.  Ewa demonstrated good  understanding of the education provided.      See EMR under Patient Instructions for exercises provided 9/18/2023.     Assessment     Ewa presented to PT today without reports of left hip pain or discomfort. Continued with exercises for improved quad and glute strength.  Ewa reported some low back discomfort during Leg Press, which decreased following verbal cues to avoid increased hip and lumbar flexion. Concluded today's session with alternating jog/walk on Alter G.  Will continue to improve RLE strength for return to previous functional levels.       Plan of care discussed with patient: Continue with  previous HEP  Patient's spiritual, cultural and educational needs considered and patient is agreeable to the plan of care and goals as stated below:      Anticipated Barriers for therapy: none    Goals:   Short Term Goals (8 Weeks):  1. Pt will be compliant with initial HEP to supplement PT in restoring pain free function.  2. Pt will reduce worst pain to 4/10 in order to progress functional deficits  3. Pt will ambulate 1mile without pain in order to return to previous level of function  4. Pt will improve hip flex strength to 4/5 in order to improve functional mobility      Long Term Goals (16 Weeks):  1. Pt will improve FOTO score to </= 73% limited to decrease perceived limitation with mobility.   2. Pt will reduce worst pain to 1/10 in order to progress functional deficits  3. Pt will have hip strength greater than 90% of contralateral lower extremity in on order to return to previous level of function  4. Pt will have full hip range of motion compared to contralateral lower extremity in order to return to previous level of function.        Plan     Continue with plan of care as indicated    Jennifer Armin PTA

## 2024-01-05 ENCOUNTER — CLINICAL SUPPORT (OUTPATIENT)
Dept: REHABILITATION | Facility: HOSPITAL | Age: 24
End: 2024-01-05
Payer: OTHER GOVERNMENT

## 2024-01-05 DIAGNOSIS — M25.60 DECREASED RANGE OF MOTION: ICD-10-CM

## 2024-01-05 DIAGNOSIS — R53.1 DECREASED STRENGTH: ICD-10-CM

## 2024-01-05 DIAGNOSIS — R26.9 ABNORMAL GAIT: Primary | ICD-10-CM

## 2024-01-05 PROCEDURE — 97530 THERAPEUTIC ACTIVITIES: CPT | Mod: PN

## 2024-01-05 PROCEDURE — 97140 MANUAL THERAPY 1/> REGIONS: CPT | Mod: PN

## 2024-01-05 NOTE — PROGRESS NOTES
[facility]   Physical Therapy Treatment Note/Progress Note     Name: Ewa Welch  [facility] Number: [id number]    Therapy Diagnosis:   Encounter Diagnoses   Name Primary?    Abnormal gait Yes    Decreased range of motion     Decreased strength          Physician: Milton Hogan*    Visit Date: 1/5/2024      Therapy Diagnosis:        Encounter Diagnoses   Name Primary?    Tear of right acetabular labrum, initial encounter      Femoroacetabular impingement of right hip      Abnormal gait      Decreased range of motion      Decreased strength          Physician: Milton Hogan*     Physician Orders: PT Eval and Treat   Medical Diagnosis from Referral: S73.191A (ICD-10-CM) - Tear of right acetabular labrum, initial encounter M25.851 (ICD-10-CM) - Femoroacetabular impingement of right hip  Evaluation Date: 9/18/2023  Authorization Period Expiration: 12/31/2023  Plan of Care Expiration: 1/16/2024  Progress Note Due: 1/18/2024  Visit # / Visits authorized:1/20 (+ 24/40 + eval)  FOTO: 5/5     Time In: 8:05 am  Time Out: 9:00 am  Total Appointment Time (timed & untimed codes): 55 minutes      Precautions: Standard   DOC 9/15/2023  PROCEDURE PERFORMED:   Right arthroscopic acetabular labral repair  Right arthroscopic osteoplasty of cam lesion  Right arthroscopic capsular plication     She will follow the hip arthroscopy with acetabular labral repair and osteoplasty protocol.  Return to clinic in 2 weeks.  Subjective     Patient reports: Had to do some prolonged squatting over the holiday while playing with niece and nephew. Feels it more in the ant hip like she cannot stay there without the hip cramping.   She was compliant with home exercise program.  Response to previous treatment: sore  Functional change: NA     Pain: 0/10  Location: right hip - only have discomfort if she moves a certain way    Objective      Objective Measures updated at progress report unless specified.  "    2023  L/E Strength w/ MicroFET Muscle Marybel Dynamometer Right Left LSI   Hip Flexion/hooklying 7.9 kg  7.2  7.9  AV.7  10.5 kg  7.7  9.8  AVG 9.4  82%   Hip Abduction 10.2 kg  10.9  11.8   AVG:10.9 10.6 kg  11.2  11.9   AV.2 97%   Hip ext 10.1  9.3  11.4  AVG:10.3 13.6  12.7  13.6  AV.3 77%   Hip IR 5.5  7.9  7.3  AV.9 7.1  8.5  9.3  AV.3 83%   Hip ER 7.8  6.5  7.8  AV.4 5.1  8.4  8.5  AV.33 101%   Quadriceps 16.1 kg  16.6  16.9   AVG:15.6 22.2 kg   22.9  19.9  AV.7 72%   Hamstrings 9.6 kg  8.72  11.4   AV.9 8.4 kg  9.8  11.6   AV.9 100%         Treatment     Ewa received the treatments listed below:      Ewa received the following manual therapy techniques: Joint mobilizations were applied to the: right hip for 10 minutes, including:  Long axis distraction mobilization and manipulation  Lateral hip distraction - great pain relief - with IR/ER  Gentle circumduction mobilization         Ewa participated in neuromuscular re-education activities to improve: Coordination, Sense, Proprioception, and Posture for 00 minutes. The following activities were included:  Single leg bridge on bench B 3x12   Supine bridge with Ham curl green ball 3x15  Clamshell at wall GTB 3x10 B  Plank on elbows hold [id number]    Not today:  Single leg balance [id number]   Single leg dynamic balance forward reaches 1h11gpy   Tripod hip ext 3x10 right  Glute bridge [id number] green theraband thighs +10#  Hamstring isometrics [id number]   Quad sets 2x20x5"  Hooklying hip add c blue ball [id number]        Ewa received therapeutic exercises to develop strength, endurance, ROM, flexibility, and posture for 00 minutes including:  Lateral Monster walk Yellow theraloop 3 laps  Side lunge/back lunge with slider 4x5      Not today:  Prone HS Curls 3x10 red theraband  Single leg shuttle 50# 2x15  Step up 12" step  Standing internal rotation/external rotation on stool 3x10  DBL Shuttle Press 2 black bands " "2x10  Standing hip Abd c internal rotation 3x10  Standing hip ext 3x10  Hand heel rocking gentle not through pan x10  DKTC w/ towel 10x 10sec hold   Prone Hip Ext 2x10    Ewa participated in dynamic functional therapeutic activities to improve functional performance for 43 minutes, including:  Upright Bike 8min lv 3  Standing hip flexion yellow theraloop x30  Deadlift hex bar 3x15  Single leg step up 12" with Blue sports band resistance over shoulder 3x10  Single leg lunge lateral/post/curtsey 15# 3x10     Not today  Matrix knee ext double leg extends SINGLE LEG eccentric lower 15# 3x12  Leg Press 25# single leg 3x10  Alter G Treadmill - 5min walk, 1min jog, 3min walk and 1min jog x3 - 80% BW support  Assessment made above  Double leg hop in place 3x30  Double leg hop Forward/backward 3x30  Double leg hop lat 3x30  Standing double limb 50% tolerance  TRX single leg squat to bench double leg stand 3x12       Home Exercises and Patient Education Provided     Education provided:   - Pt educated on standing lateral hip distraction with band    Written Home Exercises Provided: Yes, adding hopping double leg to home program  Exercises were reviewed and Ewa was able to demonstrate them prior to the end of the session.  Ewa demonstrated good  understanding of the education provided.      See EMR under Patient Instructions for exercises provided 9/18/2023.     Assessment     Ewa presented to PT today with reports of ant hip pain but with attempts at sustained upright posture.  Suspect muscle cramping due to fatigue. PT progressed pt to more functional strength training focused on hip flexion/extension/abduction in order to improve strength and reduce impingement.      Plan of care discussed with patient: Continue with previous HEP  Patient's spiritual, cultural and educational needs considered and patient is agreeable to the plan of care and goals as stated below:      Anticipated Barriers for therapy: " none    Goals:   Short Term Goals (8 Weeks):  1. Pt will be compliant with initial HEP to supplement PT in restoring pain free function.  2. Pt will reduce worst pain to 4/10 in order to progress functional deficits  3. Pt will ambulate 1mile without pain in order to return to previous level of function  4. Pt will improve hip flex strength to 4/5 in order to improve functional mobility      Long Term Goals (16 Weeks):  1. Pt will improve FOTO score to </= 73% limited to decrease perceived limitation with mobility.   2. Pt will reduce worst pain to 1/10 in order to progress functional deficits  3. Pt will have hip strength greater than 90% of contralateral lower extremity in on order to return to previous level of function  4. Pt will have full hip range of motion compared to contralateral lower extremity in order to return to previous level of function.        Plan     Continue with plan of care as indicated    Audrey Joseph PT

## 2024-01-18 ENCOUNTER — CLINICAL SUPPORT (OUTPATIENT)
Dept: REHABILITATION | Facility: HOSPITAL | Age: 24
End: 2024-01-18
Payer: OTHER GOVERNMENT

## 2024-01-18 DIAGNOSIS — M25.60 DECREASED RANGE OF MOTION: ICD-10-CM

## 2024-01-18 DIAGNOSIS — R53.1 DECREASED STRENGTH: ICD-10-CM

## 2024-01-18 DIAGNOSIS — R26.9 ABNORMAL GAIT: Primary | ICD-10-CM

## 2024-01-18 PROCEDURE — 97530 THERAPEUTIC ACTIVITIES: CPT | Mod: PN

## 2024-01-18 PROCEDURE — 97112 NEUROMUSCULAR REEDUCATION: CPT | Mod: PN

## 2024-01-18 NOTE — PROGRESS NOTES
[facility]   Physical Therapy Treatment Note/Progress Note     Name: Ewa Welch  [facility] Number: [id number]    Therapy Diagnosis:   Encounter Diagnoses   Name Primary?    Abnormal gait Yes    Decreased range of motion     Decreased strength          Physician: Milton Hogan*    Visit Date: 1/18/2024      Therapy Diagnosis:        Encounter Diagnoses   Name Primary?    Tear of right acetabular labrum, initial encounter      Femoroacetabular impingement of right hip      Abnormal gait      Decreased range of motion      Decreased strength          Physician: Milton Hogan*     Physician Orders: PT Eval and Treat   Medical Diagnosis from Referral: S73.191A (ICD-10-CM) - Tear of right acetabular labrum, initial encounter M25.851 (ICD-10-CM) - Femoroacetabular impingement of right hip  Evaluation Date: 9/18/2023  Authorization Period Expiration: 12/31/2023  Plan of Care Expiration: 1/16/2024  Progress Note Due: 1/18/2024  Visit # / Visits authorized:1/20 (+ 24/40 + eval)  FOTO: 5/5     Time In: 8:05 am  Time Out: 9:00 am  Total Appointment Time (timed & untimed codes): 55 minutes      Precautions: Standard   DOC 9/15/2023  PROCEDURE PERFORMED:   Right arthroscopic acetabular labral repair  Right arthroscopic osteoplasty of cam lesion  Right arthroscopic capsular plication     She will follow the hip arthroscopy with acetabular labral repair and osteoplasty protocol.  Return to clinic in 2 weeks.  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  Response to previous treatment: sore  Functional change: NA     Pain: 0/10  Location: right hip - only have discomfort if she moves a certain way    Objective      Objective Measures updated at progress report unless specified.     12/14/2023  L/E Strength w/ MicroFET Muscle Marybel Dynamometer Right Left  "LSI   Hip Flexion/hooklying 7.9 kg  7.2  7.9  AV.7  10.5 kg  7.7  9.8  AVG 9.4  82%   Hip Abduction 10.2 kg  10.9  11.8   AVG:10.9 10.6 kg  11.2  11.9   AV.2 97%   Hip ext 10.1  9.3  11.4  AVG:10.3 13.6  12.7  13.6  AV.3 77%   Hip IR 5.5  7.9  7.3  AV.9 7.1  8.5  9.3  AV.3 83%   Hip ER 7.8  6.5  7.8  AV.4 5.1  8.4  8.5  AV.33 101%   Quadriceps 16.1 kg  16.6  16.9   AVG:15.6 22.2 kg   22.9  19.9  AV.7 72%   Hamstrings 9.6 kg  8.72  11.4   AV.9 8.4 kg  9.8  11.6   AV.9 100%         Treatment     Ewa received the treatments listed below:      Ewa received the following manual therapy techniques: Joint mobilizations were applied to the: right hip for 00 minutes, including:  Long axis distraction mobilization and manipulation  Lateral hip distraction - great pain relief - with IR/ER  Gentle circumduction mobilization         Ewa participated in neuromuscular re-education activities to improve: Coordination, Sense, Proprioception, and Posture for 30 minutes. The following activities were included:  Hip 90/90 to comfort - 5x10s holds   Hip CARs - x10   Clamshell at wall GTB 3x10 B  Single leg RDLs 3x10 10#     Single leg bridge on bench B 3x12   Supine bridge with Ham curl green ball 3x15  Plank on elbows hold [id number]    Not today:  Single leg balance [id number]   Single leg dynamic balance forward reaches [id number]   Tripod hip ext 3x10 right  Glute bridge [id number] green theraband thighs +10#  Hamstring isometrics [id number]   Quad sets 2x20x5"  Hooklying hip add c blue ball [id number]        Ewa received therapeutic exercises to develop strength, endurance, ROM, flexibility, and posture for 00 minutes including:  Lateral Monster walk Yellow theraloop 3 laps  Side lunge/back lunge with slider 4x5      Not today:  Prone HS Curls 3x10 red theraband  Single leg shuttle 50# 2x15  Step up 12" step  Standing internal rotation/external rotation on stool 3x10  DBL Shuttle Press 2 black " "bands 2x10  Standing hip Abd c internal rotation 3x10  Standing hip ext 3x10  Hand heel rocking gentle not through pan x10  DKTC w/ towel 10x 10sec hold   Prone Hip Ext 2x10    Ewa participated in dynamic functional therapeutic activities to improve functional performance for 30 minutes, including:  Upright Bike 8min lv 3  Hip thrusters in bridge position with BB 90# 3x8  hip flexion yellow theraloop in reverse plank positon 4x5  Deadlift hex bar +20# 3x8      Single leg step up 12" with Blue sports band resistance over shoulder 3x10  Single leg lunge lateral/post/curtsey 15# 3x10     Not today  Matrix knee ext double leg extends SINGLE LEG eccentric lower 15# 3x12  Leg Press 25# single leg 3x10  Alter G Treadmill - 5min walk, 1min jog, 3min walk and 1min jog x3 - 80% BW support  Assessment made above  Double leg hop in place 3x30  Double leg hop Forward/backward 3x30  Double leg hop lat 3x30  Standing double limb 50% tolerance  TRX single leg squat to bench double leg stand 3x12       Home Exercises and Patient Education Provided     Education provided:   - Pt educated on standing lateral hip distraction with band    Written Home Exercises Provided: Yes, adding hopping double leg to home program  Exercises were reviewed and Ewa was able to demonstrate them prior to the end of the session.  Ewa demonstrated good  understanding of the education provided.      See EMR under Patient Instructions for exercises provided 9/18/2023.     Assessment     Patient completed session as noted above with progressions made with [facility] strengthening.  Excellent response to addition of hip thrusters. Also did well with hip mobility drills and she was educated to perform these gently and pain free. Continues to demo slight hip flexion deficit as noted during squat assessment. Overall doing well.        Plan of care discussed with patient: Continue with previous HEP  Patient's spiritual, cultural and educational needs considered " and patient is agreeable to the plan of care and goals as stated below:      Anticipated Barriers for therapy: none    Goals:   Short Term Goals (8 Weeks):  1. Pt will be compliant with initial HEP to supplement PT in restoring pain free function.  2. Pt will reduce worst pain to 4/10 in order to progress functional deficits  3. Pt will ambulate 1mile without pain in order to return to previous level of function  4. Pt will improve hip flex strength to 4/5 in order to improve functional mobility      Long Term Goals (16 Weeks):  1. Pt will improve FOTO score to </= 73% limited to decrease perceived limitation with mobility.   2. Pt will reduce worst pain to 1/10 in order to progress functional deficits  3. Pt will have hip strength greater than 90% of contralateral lower extremity in on order to return to previous level of function  4. Pt will have full hip range of motion compared to contralateral lower extremity in order to return to previous level of function.        Plan     Continue with plan of care as indicated    Leno Koehler PT

## 2024-02-01 ENCOUNTER — CLINICAL SUPPORT (OUTPATIENT)
Dept: REHABILITATION | Facility: HOSPITAL | Age: 24
End: 2024-02-01
Payer: OTHER GOVERNMENT

## 2024-02-01 DIAGNOSIS — R26.9 ABNORMAL GAIT: Primary | ICD-10-CM

## 2024-02-01 DIAGNOSIS — R53.1 DECREASED STRENGTH: ICD-10-CM

## 2024-02-01 DIAGNOSIS — M25.60 DECREASED RANGE OF MOTION: ICD-10-CM

## 2024-02-01 PROCEDURE — 97112 NEUROMUSCULAR REEDUCATION: CPT | Mod: PN

## 2024-02-01 PROCEDURE — 97530 THERAPEUTIC ACTIVITIES: CPT | Mod: PN

## 2024-02-01 NOTE — PROGRESS NOTES
[facility]   Physical Therapy Treatment Note/Progress Note     Name: Ewa Welch  [facility] Number: [id number]    Therapy Diagnosis:   Encounter Diagnoses   Name Primary?    Abnormal gait Yes    Decreased range of motion     Decreased strength        Physician: Milton Hogan*    Visit Date: 2/1/2024      Therapy Diagnosis:        Encounter Diagnoses   Name Primary?    Tear of right acetabular labrum, initial encounter      Femoroacetabular impingement of right hip      Abnormal gait      Decreased range of motion      Decreased strength          Physician: Mitlon Hogan*     Physician Orders: PT Eval and Treat   Medical Diagnosis from Referral: S73.191A (ICD-10-CM) - Tear of right acetabular labrum, initial encounter M25.851 (ICD-10-CM) - Femoroacetabular impingement of right hip  Evaluation Date: 9/18/2023  Authorization Period Expiration: 12/31/2023  Plan of Care Expiration: 1/16/2024  Progress Note Due: 1/18/2024  Visit # / Visits authorized:3/20 (+ 24/40 + eval)  FOTO: 5/5     Time In: 8:05 am  Time Out: 9:00 am  Total Appointment Time (timed & untimed codes): 55 minutes      Precautions: Standard   DOC 9/15/2023  PROCEDURE PERFORMED:   Right arthroscopic acetabular labral repair  Right arthroscopic osteoplasty of cam lesion  Right arthroscopic capsular plication     She will follow the hip arthroscopy with acetabular labral repair and osteoplasty protocol.  Return to clinic in 2 weeks.  Subjective     Patient reports: Has no pain, wants to see if she can really start running soon.   She was compliant with home exercise program.  Response to previous treatment: sore  Functional change: NA     Pain: 0/10  Location: right hip - only have discomfort if she moves a certain way    Objective      Objective Measures updated at progress report unless specified.     2/1/2024  L/E Strength w/ MicroFET Muscle Marybel Dynamometer Right Left LSI   Hip Flexion/hooklying 7.9  "kg  7.2  7.9  AV.7  10.5 kg  7.7  9.8  AVG 9.4  82%   Hip Abduction 10.2 kg  10.9  11.8   AVG:10.9 10.6 kg  11.2  11.9   AV.2 97%   Hip ext 10.1  9.3  11.4  AVG:10.3 13.6  12.7  13.6  AV.3 77%   Hip IR 5.5  7.9  7.3  AV.9 7.1  8.5  9.3  AV.3 83%   Hip ER 7.8  6.5  7.8  AV.4 5.1  8.4  8.5  AV.33 101%         Treatment     Ewa received the treatments listed below:      Ewa received the following manual therapy techniques: Joint mobilizations were applied to the: right hip for 00 minutes, including:  Long axis distraction mobilization and manipulation  Lateral hip distraction - great pain relief - with IR/ER  Gentle circumduction mobilization         Ewa participated in neuromuscular re-education activities to improve: Coordination, Sense, Proprioception, and Posture for 10 minutes. The following activities were included:  Hip 90/90 to comfort - 5x10s holds   Hip CARs - x10         Not today:  Single leg bridge on bench B 3x12   Supine bridge with Ham curl green ball 3x15  Plank on elbows hold [id number]  Clamshell at wall GTB 3x10 B  Single leg RDLs 3x10 10#   Single leg balance [id number]   Single leg dynamic balance forward reaches [id number]   Tripod hip ext 3x10 right  Glute bridge [id number] green theraband thighs +10#  Hamstring isometrics [id number]   Quad sets 2x20x5"  Hooklying hip add c blue ball [id number]        Ewa received therapeutic exercises to develop strength, endurance, ROM, flexibility, and posture for 00 minutes including:      Not today:  Lateral Monster walk Yellow theraloop 3 laps  Side lunge/back lunge with slider 4x5    Prone HS Curls 3x10 red theraband  Single leg shuttle 50# 2x15  Step up 12" step  Standing internal rotation/external rotation on stool 3x10  DBL Shuttle Press 2 black bands 2x10  Standing hip Abd c internal rotation 3x10  Standing hip ext 3x10  Hand heel rocking gentle not through pan x10  DKTC w/ towel 10x 10sec hold   Prone Hip Ext 2x10    Ewa " "participated in dynamic functional therapeutic activities to improve functional performance for 45 minutes, including:  Upright Bike 8min lv 3  Hip thrusters in bridge position with BB 90# 3x12  Deadlift hex bar 20# 3x12  Squat hold with Yellow theraloop hip abduction pulse [id number]  [place] Split squat 25# 3x10     Not today:  hip flexion yellow theraloop in reverse plank positon 4x5  Single leg step up 12" with Blue sports band resistance over shoulder 3x10  Single leg lunge lateral/post/curtsey 15# 3x10   Matrix knee ext double leg extends SINGLE LEG eccentric lower 15# 3x12  Leg Press 25# single leg 3x10  Alter G Treadmill - 5min walk, 1min jog, 3min walk and 1min jog x3 - 80% BW support  Assessment made above  Double leg hop in place 3x30  Double leg hop Forward/backward 3x30  Double leg hop lat 3x30  Standing double limb 50% tolerance  TRX single leg squat to bench double leg stand 3x12       Home Exercises and Patient Education Provided     Education provided:   - Pt educated on standing lateral hip distraction with band    Written Home Exercises Provided: Yes, adding hopping double leg to home program  Exercises were reviewed and Ewa was able to demonstrate them prior to the end of the session.  Ewa demonstrated good  understanding of the education provided.      See EMR under Patient Instructions for exercises provided 9/18/2023.     Assessment     Ewa presents to PT with no pain complaints. She reports some back issues during tx and was educated on proper activation in order to reduce back strain. Hip strength improving and she was encouraged to start hop progression at home in order to progress towards return to run.     Plan of care discussed with patient: Continue with previous HEP  Patient's spiritual, cultural and educational needs considered and patient is agreeable to the plan of care and goals as stated below:      Anticipated Barriers for therapy: none    Goals:   Short Term Goals (8 " Weeks):  1. Pt will be compliant with initial HEP to supplement PT in restoring pain free function.  2. Pt will reduce worst pain to 4/10 in order to progress functional deficits  3. Pt will ambulate 1mile without pain in order to return to previous level of function  4. Pt will improve hip flex strength to 4/5 in order to improve functional mobility      Long Term Goals (16 Weeks):  1. Pt will improve FOTO score to </= 73% limited to decrease perceived limitation with mobility.   2. Pt will reduce worst pain to 1/10 in order to progress functional deficits  3. Pt will have hip strength greater than 90% of contralateral lower extremity in on order to return to previous level of function  4. Pt will have full hip range of motion compared to contralateral lower extremity in order to return to previous level of function.        Plan     Continue with plan of care as indicated    Audrey Joseph, PT

## 2024-02-01 NOTE — PLAN OF CARE
[facility]  Physical Therapy Plan of Care Note     Name: Ewa Welch  [facility] Number: [id number]    Therapy Diagnosis:   Encounter Diagnoses   Name Primary?    Abnormal gait Yes    Decreased range of motion     Decreased strength      Physician: Milton Hogan*    Visit Date: 2024    Physician Orders: PT Eval and Treat   Medical Diagnosis from Referral: S73.191A (ICD-10-CM) - Tear of right acetabular labrum, initial encounter M25.851 (ICD-10-CM) - Femoroacetabular impingement of right hip  Evaluation Date: 2023  Authorization Period Expiration: 2023  Plan of Care Expiration: 2024  Progress Note Due: 2024  Visit # / Visits authorized:3/20 ( + eval)  FOTO:      Precautions: Standard   DOC 9/15/2023  PROCEDURE PERFORMED:   Right arthroscopic acetabular labral repair  Right arthroscopic osteoplasty of cam lesion  Right arthroscopic capsular plication     She will follow the hip arthroscopy with acetabular labral repair and osteoplasty protocol.  Return to clinic in 2 weeks.    SUBJECTIVE     Update:   Patient reports: Has no pain, wants to see if she can really start running soon.   She was compliant with home exercise program.  Response to previous treatment: sore  Functional change: NA     Pain: 0/10  Location: right hip - only have discomfort if she moves a certain way    OBJECTIVE     Update:   2024  L/E Strength w/ MicroFET Muscle Marybel Dynamometer Right Left LSI   Hip Flexion/hooklying 7.9 kg  7.2  7.9  AV.7  10.5 kg  7.7  9.8  AVG 9.4  82%   Hip Abduction 10.2 kg  10.9  11.8   AVG:10.9 10.6 kg  11.2  11.9   AV.2 97%   Hip ext 10.1  9.3  11.4  AVG:10.3 13.6  12.7  13.6  AV.3 77%   Hip IR 5.5  7.9  7.3  AV.9 7.1  8.5  9.3  AV.3 83%   Hip ER 7.8  6.5  7.8  AV.4 5.1  8.4  8.5  AV.33 101%        ASSESSMENT     Update:   Ewa presents to PT with no pain complaints. She reports some back issues during tx and was  educated on proper activation in order to reduce back strain. Hip strength improving and she was encouraged to start hop progression at home in order to progress towards return to run.      Plan of care discussed with patient: Continue with previous HEP  Patient's spiritual, cultural and educational needs considered and patient is agreeable to the plan of care and goals as stated below:      Anticipated Barriers for therapy: none     Goals:   Short Term Goals (8 Weeks):  1. Pt will be compliant with initial HEP to supplement PT in restoring pain free function.- met  2. Pt will reduce worst pain to 4/10 in order to progress functional deficits - met  3. Pt will ambulate 1mile without pain in order to return to previous level of function - met  4. Pt will improve hip flex strength to 4/5 in order to improve functional mobility - met     Long Term Goals (16 Weeks):  1. Pt will improve FOTO score to </= 73% limited to decrease perceived limitation with mobility.   2. Pt will reduce worst pain to 1/10 in order to progress functional deficits - met  3. Pt will have hip strength greater than 90% of contralateral lower extremity in on order to return to previous level of function  4. Pt will have full hip range of motion compared to contralateral lower extremity in order to return to previous level of function. - met       Previous Short Term Goals Status:   See above  New Short Term Goals Status:   see above  Long Term Goal Status: modified:  see above  Reasons for Recertification of Therapy:   see above    GOALS  See above    PLAN     Updated Certification Period: 1/18/2024 to 3/2/2024   Recommended Treatment Plan: 1 times per week for 6 weeks:  Gait Training, Manual Therapy, Neuromuscular Re-ed, Patient Education, Therapeutic Activities, and Therapeutic Exercise  Other Recommendations: none    Audrey Joseph, PT

## 2024-02-19 ENCOUNTER — CLINICAL SUPPORT (OUTPATIENT)
Dept: REHABILITATION | Facility: HOSPITAL | Age: 24
End: 2024-02-19
Payer: OTHER GOVERNMENT

## 2024-02-19 DIAGNOSIS — M25.60 DECREASED RANGE OF MOTION: ICD-10-CM

## 2024-02-19 DIAGNOSIS — R26.9 ABNORMAL GAIT: Primary | ICD-10-CM

## 2024-02-19 DIAGNOSIS — R53.1 DECREASED STRENGTH: ICD-10-CM

## 2024-02-19 PROCEDURE — 97110 THERAPEUTIC EXERCISES: CPT | Mod: PN

## 2024-02-19 PROCEDURE — 97530 THERAPEUTIC ACTIVITIES: CPT | Mod: PN

## 2024-02-19 NOTE — PROGRESS NOTES
[facility]   Physical Therapy Treatment Note/Progress Note     Name: Ewa Welch  [facility] Number: [id number]    Therapy Diagnosis:   Encounter Diagnoses   Name Primary?    Abnormal gait Yes    Decreased range of motion     Decreased strength          Physician: Milton Hogan*    Visit Date: 2/19/2024      Therapy Diagnosis:        Encounter Diagnoses   Name Primary?    Tear of right acetabular labrum, initial encounter      Femoroacetabular impingement of right hip      Abnormal gait      Decreased range of motion      Decreased strength          Physician: Milton Hogan*     Physician Orders: PT Eval and Treat   Medical Diagnosis from Referral: S73.191A (ICD-10-CM) - Tear of right acetabular labrum, initial encounter M25.851 (ICD-10-CM) - Femoroacetabular impingement of right hip  Evaluation Date: 12/31/2024  Authorization Period Expiration: 12/31/2023  Plan of Care Expiration: 1/18/2024 to 3/2/2024    Progress Note Due: 3/1/2024  Visit # / Visits authorized:3/20 (+ 24/40 + eval)  FOTO: 5/5     Time In: 8:05 am  Time Out: 9:00 am  Total Appointment Time (timed & untimed codes): 55 minutes      Precautions: Standard   DOC 9/15/2023  PROCEDURE PERFORMED:   Right arthroscopic acetabular labral repair  Right arthroscopic osteoplasty of cam lesion  Right arthroscopic capsular plication     She will follow the hip arthroscopy with acetabular labral repair and osteoplasty protocol.  Return to clinic in 2 weeks.  Subjective     Patient reports: Will feel like the hip is maybe alittle loose when she plants and twists. Has not been doing much of her exercises due to [place].   She was compliant with home exercise program.  Response to previous treatment: sore  Functional change: NA     Pain: 0/10  Location: right hip - only have discomfort if she moves a certain way    Objective      Objective Measures updated at progress report unless specified.     2/1/2024  L/E Strength  "w/ MicroFET Muscle Marybel Dynamometer Right Left LSI   Hip Flexion/hooklying 7.9 kg  7.2  7.9  AV.7  10.5 kg  7.7  9.8  AVG 9.4  82%   Hip Abduction 10.2 kg  10.9  11.8   AVG:10.9 10.6 kg  11.2  11.9   AV.2 97%   Hip ext 10.1  9.3  11.4  AVG:10.3 13.6  12.7  13.6  AV.3 77%   Hip IR 5.5  7.9  7.3  AV.9 7.1  8.5  9.3  AV.3 83%   Hip ER 7.8  6.5  7.8  AV.4 5.1  8.4  8.5  AV.33 101%         Treatment     Ewa received the treatments listed below:      Ewa received the following manual therapy techniques: Joint mobilizations were applied to the: right hip for 00 minutes, including:  Long axis distraction mobilization and manipulation  Lateral hip distraction - great pain relief - with IR/ER  Gentle circumduction mobilization         Ewa participated in neuromuscular re-education activities to improve: Coordination, Sense, Proprioception, and Posture for 8 minutes. The following activities were included:    1/2 knee B 2x10    Not today:  Hip 90/90 to comfort - 5x10s holds   Single leg bridge on bench B 3x12   Supine bridge with Ham curl green ball 3x15  Plank on elbows hold [id number]  Clamshell at wall GTB 3x10 B  Single leg RDLs 3x10 10#   Single leg balance [id number]   Single leg dynamic balance forward reaches [id number]   Tripod hip ext 3x10 right  Glute bridge [id number] green theraband thighs +10#  Hamstring isometrics [id number]   Quad sets 2x20x5"  Hooklying hip add c blue ball [id number]        Ewa received therapeutic exercises to develop strength, endurance, ROM, flexibility, and posture for 00 minutes including:      Not today:  Lateral Monster walk Yellow theraloop 3 laps  Side lunge/back lunge with slider 4x5    Prone HS Curls 3x10 red theraband  Single leg shuttle 50# 2x15  Step up 12" step  Standing internal rotation/external rotation on stool 3x10  DBL Shuttle Press 2 black bands 2x10  Standing hip Abd c internal rotation 3x10  Standing hip ext 3x10  Hand heel rocking gentle " "not through pan x10  DKTC w/ towel 10x 10sec hold   Prone Hip Ext 2x10    Ewa participated in dynamic functional therapeutic activities to improve functional performance for 45 minutes, including:    Dynamic warm up: scoops, hip mobs, quad pulls, side lunges, frankensteinsx2  Deadlift hex bar 20# 3x12  Squat hold with Yellow theraloop hip abduction pulse [id number]  Slovak Split squat 25# 3x10     Not today:  Hip thrusters in bridge position with BB 90# 3x12  Upright Bike 8min lv 3  hip flexion yellow theraloop in reverse plank positon 4x5  Single leg step up 12" with Blue sports band resistance over shoulder 3x10  Single leg lunge lateral/post/curtsey 15# 3x10   Matrix knee ext double leg extends SINGLE LEG eccentric lower 15# 3x12  Leg Press 25# single leg 3x10  Alter G Treadmill - 5min walk, 1min jog, 3min walk and 1min jog x3 - 80% BW support  Assessment made above  Double leg hop in place 3x30  Double leg hop Forward/backward 3x30  Double leg hop lat 3x30  Standing double limb 50% tolerance  TRX single leg squat to bench double leg stand 3x12       Home Exercises and Patient Education Provided     Education provided:   - Pt educated on standing lateral hip distraction with band    Written Home Exercises Provided: Yes, adding hopping double leg to home program  Exercises were reviewed and Ewa was able to demonstrate them prior to the end of the session.  Ewa demonstrated good  understanding of the education provided.      See EMR under Patient Instructions for exercises provided 9/18/2023.     Assessment     Ewa presents to PT with minor complaints of hip abnormalities. PT assessment did not should joint mobility deficits. She was educated on full healing times and how certain activities like planting and turning may remain problematic and to attempt to avoid them as she heals. Strength was slightly diminished compared to previous visit and regression in exercises was performed. Cuing continues to be " required.     Plan of care discussed with patient: Continue with previous HEP  Patient's spiritual, cultural and educational needs considered and patient is agreeable to the plan of care and goals as stated below:      Anticipated Barriers for therapy: none    Goals:   Short Term Goals (8 Weeks):  1. Pt will be compliant with initial HEP to supplement PT in restoring pain free function.  2. Pt will reduce worst pain to 4/10 in order to progress functional deficits  3. Pt will ambulate 1mile without pain in order to return to previous level of function  4. Pt will improve hip flex strength to 4/5 in order to improve functional mobility      Long Term Goals (16 Weeks):  1. Pt will improve FOTO score to </= 73% limited to decrease perceived limitation with mobility.   2. Pt will reduce worst pain to 1/10 in order to progress functional deficits  3. Pt will have hip strength greater than 90% of contralateral lower extremity in on order to return to previous level of function  4. Pt will have full hip range of motion compared to contralateral lower extremity in order to return to previous level of function.        Plan     Continue with plan of care as indicated    Audrey Joseph PT

## 2024-02-26 ENCOUNTER — CLINICAL SUPPORT (OUTPATIENT)
Dept: REHABILITATION | Facility: HOSPITAL | Age: 24
End: 2024-02-26
Payer: OTHER GOVERNMENT

## 2024-02-26 DIAGNOSIS — R26.9 ABNORMAL GAIT: Primary | ICD-10-CM

## 2024-02-26 DIAGNOSIS — R53.1 DECREASED STRENGTH: ICD-10-CM

## 2024-02-26 DIAGNOSIS — M25.60 DECREASED RANGE OF MOTION: ICD-10-CM

## 2024-02-26 PROCEDURE — 97530 THERAPEUTIC ACTIVITIES: CPT | Mod: PN

## 2024-02-26 PROCEDURE — 97112 NEUROMUSCULAR REEDUCATION: CPT | Mod: PN

## 2024-03-04 ENCOUNTER — CLINICAL SUPPORT (OUTPATIENT)
Dept: REHABILITATION | Facility: HOSPITAL | Age: 24
End: 2024-03-04
Payer: OTHER GOVERNMENT

## 2024-03-04 DIAGNOSIS — R26.9 ABNORMAL GAIT: Primary | ICD-10-CM

## 2024-03-04 DIAGNOSIS — M25.60 DECREASED RANGE OF MOTION: ICD-10-CM

## 2024-03-04 DIAGNOSIS — R53.1 DECREASED STRENGTH: ICD-10-CM

## 2024-03-04 PROCEDURE — 97530 THERAPEUTIC ACTIVITIES: CPT | Mod: PN

## 2024-03-04 PROCEDURE — 97110 THERAPEUTIC EXERCISES: CPT | Mod: PN

## 2024-03-04 NOTE — PROGRESS NOTES
[facility]   Physical Therapy Treatment Note/Progress Note     Name: Ewa Welch  [facility] Number: [id number]    Therapy Diagnosis:   Encounter Diagnoses   Name Primary?    Abnormal gait Yes    Decreased range of motion     Decreased strength      Physician: Milton Hogan*    Visit Date: 3/4/2024      Therapy Diagnosis:        Encounter Diagnoses   Name Primary?    Tear of right acetabular labrum, initial encounter      Femoroacetabular impingement of right hip      Abnormal gait      Decreased range of motion      Decreased strength          Physician: Milton Hogan*     Physician Orders: PT Eval and Treat   Medical Diagnosis from Referral: S73.191A (ICD-10-CM) - Tear of right acetabular labrum, initial encounter M25.851 (ICD-10-CM) - Femoroacetabular impingement of right hip  Evaluation Date: 12/31/2024  Authorization Period Expiration: 12/31/2023  Plan of Care Expiration: 1/18/2024 to 3/2/2024    Progress Note Due: 3/1/2024  Visit # / Visits authorized: 6/20 (+ 24/40 + eval)  FOTO: 5/5     Time In: 8:02am  Time Out: 8:52 am  Total Appointment Time (timed & untimed codes): 50 minutes      Precautions: Standard   DOC 9/15/2023  PROCEDURE PERFORMED:   Right arthroscopic acetabular labral repair  Right arthroscopic osteoplasty of cam lesion  Right arthroscopic capsular plication     She will follow the hip arthroscopy with acetabular labral repair and osteoplasty protocol.  Return to clinic in 2 weeks.  Subjective     Patient reports Doing well, had no issues with the hip over the weekend. Did have a bad reaction to some food and was sick all day yesterday.  She was compliant with home exercise program.  Response to previous treatment: sore  Functional change: NA     Pain: 0/10  Location: right hip - only have discomfort if she moves a certain way    Objective      Objective Measures updated at progress report unless specified.     2/1/2024  L/E Strength w/ MicroFET  "Muscle Marybel Dynamometer Right Left LSI   Hip Flexion/hooklying 7.9 kg  7.2  7.9  AV.7  10.5 kg  7.7  9.8  AVG 9.4  82%   Hip Abduction 10.2 kg  10.9  11.8   AVG:10.9 10.6 kg  11.2  11.9   AV.2 97%   Hip ext 10.1  9.3  11.4  AVG:10.3 13.6  12.7  13.6  AV.3 77%   Hip IR 5.5  7.9  7.3  AV.9 7.1  8.5  9.3  AV.3 83%   Hip ER 7.8  6.5  7.8  AV.4 5.1  8.4  8.5  AV.33 101%         Treatment     Ewa received the treatments listed below:      Ewa received the following manual therapy techniques: Joint mobilizations were applied to the: right hip for 00 minutes, including:  Long axis distraction mobilization and manipulation  Lateral hip distraction - great pain relief - with IR/ER  Gentle circumduction mobilization         Ewa participated in neuromuscular re-education activities to improve: Coordination, Sense, Proprioception, and Posture for 10 minutes. The following activities were included:    1/2 knee B hip hurdles middle level 2x10    Not today:  Hip 90/90 to comfort - 5x10s holds   Single leg bridge on bench B 3x12   Supine bridge with Ham curl green ball 3x15  Plank on elbows hold [id number]  Clamshell at wall GTB 3x10 B  Single leg RDLs 3x10 10#   Single leg balance [id number]   Single leg dynamic balance forward reaches [id number]   Tripod hip ext 3x10 right  Glute bridge [id number] green theraband thighs +10#  Hamstring isometrics [id number]   Quad sets 2x20x5"  Hooklying hip add c blue ball [id number]        Ewa received therapeutic exercises to develop strength, endurance, ROM, flexibility, and posture for 00 minutes including:      Not today:  Lateral Monster walk Yellow theraloop 3 laps  Side lunge/back lunge with slider 4x5    Prone HS Curls 3x10 red theraband  Single leg shuttle 50# 2x15  Step up 12" step  Standing internal rotation/external rotation on stool 3x10  DBL Shuttle Press 2 black bands 2x10  Standing hip Abd c internal rotation 3x10  Standing hip ext 3x10  Hand heel " "rocking gentle not through pan x10  DKTC w/ towel 10x 10sec hold   Prone Hip Ext 2x10    Ewa participated in dynamic functional therapeutic activities to improve functional performance for 38 minutes, including:    Dynamic warm up: scoops, hip mobs, quad pulls, side lunges, frankensteinsx2  Double leg hop in place 3x30  Double leg hop Forward/backward 3x30  Double leg hop lat 3x30      Deadlift hex bar aingle leg RLD 3x10  Squat hold with Yellow theraloop hip abduction pulse [id number]  Slovak Split squat 10# 3x10     Not today:  Hip thrusters in bridge position with BB 90# 3x12  Upright Bike 8min lv 3  hip flexion yellow theraloop in reverse plank positon 4x5  Single leg step up 12" with Blue sports band resistance over shoulder 3x10  Single leg lunge lateral/post/curtsey 15# 3x10   Matrix knee ext double leg extends SINGLE LEG eccentric lower 15# 3x12  Leg Press 25# single leg 3x10  Alter G Treadmill - 5min walk, 1min jog, 3min walk and 1min jog x3 - 80% BW support  Assessment made above    Standing double limb 50% tolerance  TRX single leg squat to bench double leg stand 3x12       Home Exercises and Patient Education Provided     Education provided:   - Pt educated on standing lateral hip distraction with band    Written Home Exercises Provided: Yes, adding hopping double leg to home program  Exercises were reviewed and Ewa was able to demonstrate them prior to the end of the session.  Ewa demonstrated good  understanding of the education provided.      See EMR under Patient Instructions for exercises provided 9/18/2023.     Assessment     Ewa presents to PT with no complaints. Assessment of strength was going to be performed, however pt got ill and had to leave treatment early. Will assess next visit.     Plan of care discussed with patient: Continue with previous HEP  Patient's spiritual, cultural and educational needs considered and patient is agreeable to the plan of care and goals as stated " below:      Anticipated Barriers for therapy: none    Goals:   Short Term Goals (8 Weeks):  1. Pt will be compliant with initial HEP to supplement PT in restoring pain free function.  2. Pt will reduce worst pain to 4/10 in order to progress functional deficits  3. Pt will ambulate 1mile without pain in order to return to previous level of function  4. Pt will improve hip flex strength to 4/5 in order to improve functional mobility      Long Term Goals (16 Weeks):  1. Pt will improve FOTO score to </= 73% limited to decrease perceived limitation with mobility.   2. Pt will reduce worst pain to 1/10 in order to progress functional deficits  3. Pt will have hip strength greater than 90% of contralateral lower extremity in on order to return to previous level of function  4. Pt will have full hip range of motion compared to contralateral lower extremity in order to return to previous level of function.        Plan     Continue with plan of care as indicated    Audrey Joseph, PT

## 2024-03-05 ENCOUNTER — OFFICE VISIT (OUTPATIENT)
Dept: SPORTS MEDICINE | Facility: CLINIC | Age: 24
End: 2024-03-05
Payer: OTHER GOVERNMENT

## 2024-03-05 VITALS — HEIGHT: 66 IN | BODY MASS INDEX: 21.21 KG/M2 | WEIGHT: 132 LBS

## 2024-03-05 DIAGNOSIS — M25.851 FEMOROACETABULAR IMPINGEMENT OF RIGHT HIP: ICD-10-CM

## 2024-03-05 DIAGNOSIS — Z98.890 S/P HIP ARTHROSCOPY: Primary | ICD-10-CM

## 2024-03-05 DIAGNOSIS — S73.191D TEAR OF RIGHT ACETABULAR LABRUM, SUBSEQUENT ENCOUNTER: ICD-10-CM

## 2024-03-05 PROCEDURE — 99213 OFFICE O/P EST LOW 20 MIN: CPT | Mod: S$PBB,,, | Performed by: STUDENT IN AN ORGANIZED HEALTH CARE EDUCATION/TRAINING PROGRAM

## 2024-03-05 PROCEDURE — 99999 PR PBB SHADOW E&M-EST. PATIENT-LVL III: CPT | Mod: PBBFAC,,, | Performed by: STUDENT IN AN ORGANIZED HEALTH CARE EDUCATION/TRAINING PROGRAM

## 2024-03-05 PROCEDURE — 99213 OFFICE O/P EST LOW 20 MIN: CPT | Mod: PBBFAC | Performed by: STUDENT IN AN ORGANIZED HEALTH CARE EDUCATION/TRAINING PROGRAM

## 2024-03-05 NOTE — PROGRESS NOTES
Subjective:     Chief Complaint: Ewa Welch is a 23 y.o. female who had concerns including Follow-up of the Right Hip.    HPI    Ewa Welch is a 23 y.o. female presents status post below.  Overall she was doing well.  She is no real pain in the hip.  She does note some tightness mainly in the hip flexors and adductors when she goes into a figure 4 position to put on her shoes or pains.  This is manageable and is improving. She has begun some jump training with physical therapy and tolerates this well. Denies any numbness or paresthesias.  Overall, she is pleased with her progress.    PROCEDURE PERFORMED by Leonard Becker MD on 09/15/2023:   Right arthroscopic acetabular labral repair  Right arthroscopic osteoplasty of cam lesion  Right arthroscopic capsular plication    Past Medical History:   Diagnosis Date    Generalized anxiety disorder 03/29/2016    Infected pilonidal cyst 4/5/2017    Seasonal allergic rhinitis due to pollen 4/4/2013    Self-harming behavior     Tonsillar hypertrophy 2/12/2018       Current Outpatient Medications on File Prior to Visit   Medication Sig Dispense Refill    fluticasone (FLONASE) 50 mcg/actuation nasal spray 1 spray (50 mcg total) by Each Nare route once daily. 16 g 11    celecoxib (CELEBREX) 200 MG capsule Take 1 capsule (200 mg total) by mouth 2 (two) times daily with meals. (Patient not taking: Reported on 3/5/2024) 60 capsule 0    ibuprofen (ADVIL,MOTRIN) 200 MG tablet Take 200 mg by mouth every 6 (six) hours as needed for Pain.      loratadine (CLARITIN) 10 mg tablet Take 1 tablet (10 mg total) by mouth once daily. (Patient taking differently: Take 10 mg by mouth daily as needed. ) 30 tablet 3    methocarbamoL (ROBAXIN) 500 MG Tab Take 1 tablet (500 mg total) by mouth 3 (three) times daily as needed (muscle spasms). (Patient not taking: Reported on 3/5/2024) 30 tablet 0    oxyCODONE (ROXICODONE) 5 MG immediate release tablet Take 1 tablet (5 mg total) by mouth every 6 (six)  hours as needed for Pain. (Patient not taking: Reported on 9/28/2023) 28 tablet 0    promethazine (PHENERGAN) 25 MG tablet Take 1 tablet (25 mg total) by mouth every 6 (six) hours as needed for Nausea. (Patient not taking: Reported on 12/7/2023) 30 tablet 0     No current facility-administered medications on file prior to visit.       Past Surgical History:   Procedure Laterality Date    ADENOIDECTOMY  02/12/2018    Dr. Bernard    ARTHROSCOPIC FEMOROPLASTY Right 9/15/2023    Procedure: FEMOROPLASTY, ARTHROSCOPIC;  Surgeon: Leonard Becker MD;  Location: [facility] OR;  Service: Orthopedics;  Laterality: Right;    ARTHROSCOPY, HIP Right 9/15/2023    Procedure: ARTHROSCOPY, HIP, WITH ACETABULOPLASTY, WITH REPAIR OF LABRUM ;  Surgeon: Leonard Becker MD;  Location: [facility] OR;  Service: Orthopedics;  Laterality: Right;  CAPSULE PLICATION    OTHER SURGICAL HISTORY Left     toenail removal big toe    TONSILLECTOMY  02/12/2018    Dr. Bernard    WISDOM TOOTH EXTRACTION Bilateral        Family History   Problem Relation Age of Onset    Allergies Mother     Eczema Sister     Cancer Paternal Grandmother     Cirrhosis Paternal Grandmother     No Known Problems Father        Social History     Socioeconomic History    Marital status:    Tobacco Use    Smoking status: Every Day     Types: Vaping with nicotine    Smokeless tobacco: Never   Substance and Sexual Activity    Alcohol use: Yes     Comment: Occasional    Drug use: Yes     Types: Marijuana     Comment: daily    Sexual activity: Yes     Partners: Female, Male   Social History Narrative    Lives with mom, dad and sister Erika.    She is planning to go to the [employer] next year.    Sexually relationship with female.               Review of Systems   Constitutional: Negative.   HENT: Negative.     Eyes: Negative.    Cardiovascular: Negative.    Respiratory: Negative.     Endocrine: Negative.    Hematologic/Lymphatic: Negative.    Skin: Negative.    Musculoskeletal:   Negative for falls, joint pain, joint swelling and muscle weakness.   Neurological: Negative.    Psychiatric/Behavioral: Negative.     Allergic/Immunologic: Negative.                    Objective:     General: Ewa is well-developed, well-nourished, appears stated age, in no acute distress, alert and oriented to time, place and person.     General    Nursing note and vitals reviewed.  Constitutional: She is oriented to person, place, and time. She appears well-developed and well-nourished. No distress.   HENT:   Head: Normocephalic and atraumatic.   Nose: Nose normal.   Eyes: EOM are normal.   Cardiovascular:  Intact distal pulses.            Pulmonary/Chest: Effort normal. No respiratory distress.   Neurological: She is alert and oriented to person, place, and time.   Psychiatric: She has a normal mood and affect. Her behavior is normal. Judgment and thought content normal.           Right Knee Exam     Inspection   Alignment:  normal  Effusion: absent    Right Hip Exam     Inspection   Scars: present  Swelling: absent  Bruising: absent  No deformity of hip.  Quadriceps Atrophy:  Negative  Erythema: absent    Range of Motion   Extension:  0   Flexion:  120   External rotation:  70   Internal rotation:  20     Tests   Pain w/ forced internal rotation (LIZETTE): absent  Pain w/ forced external rotation (FADIR): absent  Cynthia: negative  Stinchfield test: negative    Other   Sensation: normal      Muscle Strength   Right Lower Extremity   Hip Abduction: 5/5   Hip Adduction: 5/5   Hip Flexion: 5/5   Ankle Dorsiflexion:  5/5     Vascular Exam     Right Pulses  Dorsalis Pedis:      2+  Posterior Tibial:      2+        Edema  Right Upper Leg: absent          Assessment:   Ewa Welch is a 23 y.o. female status post above and doing well.  Encounter Diagnoses   Name Primary?    S/P hip arthroscopy Yes    Tear of right acetabular labrum, subsequent encounter     Femoroacetabular impingement of right hip             Plan:      She is doing very well.  Okay to begin running.  Return to clinic in 2 months.      All of the patient's questions were answered. Patient was advised to call the clinic or contact me through the patient portal for any questions or concerns.         Patient questionnaires may have been collected.

## 2024-03-18 ENCOUNTER — CLINICAL SUPPORT (OUTPATIENT)
Dept: REHABILITATION | Facility: HOSPITAL | Age: 24
End: 2024-03-18
Payer: OTHER GOVERNMENT

## 2024-03-18 DIAGNOSIS — R53.1 DECREASED STRENGTH: ICD-10-CM

## 2024-03-18 DIAGNOSIS — R26.9 ABNORMAL GAIT: Primary | ICD-10-CM

## 2024-03-18 DIAGNOSIS — M25.60 DECREASED RANGE OF MOTION: ICD-10-CM

## 2024-03-18 PROCEDURE — 97530 THERAPEUTIC ACTIVITIES: CPT | Mod: PN

## 2024-03-18 NOTE — PROGRESS NOTES
[facility]   Physical Therapy Treatment Note/Discharge Summary     Name: Ewa Welch  [facility] Number: [id number]    Therapy Diagnosis:   Encounter Diagnoses   Name Primary?    Abnormal gait Yes    Decreased range of motion     Decreased strength        Physician: Milton Hogan*    Visit Date: 3/18/2024      Therapy Diagnosis:        Encounter Diagnoses   Name Primary?    Tear of right acetabular labrum, initial encounter      Femoroacetabular impingement of right hip      Abnormal gait      Decreased range of motion      Decreased strength          Physician: Milton Hogan*     Physician Orders: PT Eval and Treat   Medical Diagnosis from Referral: S73.191A (ICD-10-CM) - Tear of right acetabular labrum, initial encounter M25.851 (ICD-10-CM) - Femoroacetabular impingement of right hip  Evaluation Date: 12/31/2024  Authorization Period Expiration: 12/31/2023  Plan of Care Expiration: 1/18/2024 to 3/2/2024    Progress Note Due: 3/1/2024  Visit # / Visits authorized: 7/20 (+ 24/40 + eval)  FOTO: 5/5     Time In: 7:02am  Time Out: 7:56 am  Total Appointment Time (timed & untimed codes): 56 minutes      Precautions: Standard   DOC 9/15/2023  PROCEDURE PERFORMED:   Right arthroscopic acetabular labral repair  Right arthroscopic osteoplasty of cam lesion  Right arthroscopic capsular plication     She will follow the hip arthroscopy with acetabular labral repair and osteoplasty protocol.  Return to clinic in 2 weeks.  Subjective     Patient reports: Feels really good, will still pivot certain ways and feel it but its not bad at all.  She was compliant with home exercise program.  Response to previous treatment: sore  Functional change: NA     Pain: 0/10  Location: right hip - only have discomfort if she moves a certain way    Objective      Objective Measures updated at progress report unless specified.     3/18/2024  L/E Strength w/ MicroFET Muscle Marybel Dynamometer Right  "Left LSI   Hip Flexion/hooklying 11.8   9.5 82%   Hip Abduction 12.9 11.7 97%   Hip ext 12.9 11.9 77%   Hip IR 8.1 12.3 83%   Hip ER 11.4 9.1 101%         Treatment     Ewa received the treatments listed below:      Ewa received the following manual therapy techniques: Joint mobilizations were applied to the: right hip for 00 minutes, including:  Long axis distraction mobilization and manipulation  Lateral hip distraction - great pain relief - with IR/ER  Gentle circumduction mobilization         Ewa participated in neuromuscular re-education activities to improve: Coordination, Sense, Proprioception, and Posture for 00 minutes. The following activities were included:    1/2 knee B hip hurdles middle level 2x10    Not today:  Hip 90/90 to comfort - 5x10s holds   Single leg bridge on bench B 3x12   Supine bridge with Ham curl green ball 3x15  Plank on elbows hold [id number]  Clamshell at wall GTB 3x10 B  Single leg RDLs 3x10 10#   Single leg balance [id number]   Single leg dynamic balance forward reaches [id number]   Tripod hip ext 3x10 right  Glute bridge [id number] green theraband thighs +10#  Hamstring isometrics [id number]   Quad sets 2x20x5"  Hooklying hip add c blue ball [id number]        Ewa received therapeutic exercises to develop strength, endurance, ROM, flexibility, and posture for 00 minutes including:      Not today:  Lateral Monster walk Yellow theraloop 3 laps  Side lunge/back lunge with slider 4x5    Prone HS Curls 3x10 red theraband  Single leg shuttle 50# 2x15  Step up 12" step  Standing internal rotation/external rotation on stool 3x10  DBL Shuttle Press 2 black bands 2x10  Standing hip Abd c internal rotation 3x10  Standing hip ext 3x10  Hand heel rocking gentle not through pan x10  DKTC w/ towel 10x 10sec hold   Prone Hip Ext 2x10    Ewa participated in dynamic functional therapeutic activities to improve functional performance for 55 minutes, including:    Dynamic warm up: scoops, hip mobs, " "quad pulls, side lunges, frankensteinsx2  single leg hop in place 3x30  Single leg hop Forward/backward 3x30  Single leg hop lat 3x30  Treadmill 2min walk 1min jog x5      Deadlift hex bar aingle leg RLD 3x10  Squat hold with Yellow theraloop hip abduction pulse [id number]  Vietnamese Split squat 10# 3x10     Not today:  Hip thrusters in bridge position with BB 90# 3x12  Upright Bike 8min lv 3  hip flexion yellow theraloop in reverse plank positon 4x5  Single leg step up 12" with Blue sports band resistance over shoulder 3x10  Single leg lunge lateral/post/curtsey 15# 3x10   Matrix knee ext double leg extends SINGLE LEG eccentric lower 15# 3x12  Leg Press 25# single leg 3x10  Alter G Treadmill - 5min walk, 1min jog, 3min walk and 1min jog x3 - 80% BW support  Assessment made above    Standing double limb 50% tolerance  TRX single leg squat to bench double leg stand 3x12       Home Exercises and Patient Education Provided     Education provided:   - Pt educated on standing lateral hip distraction with band    Written Home Exercises Provided: Yes, adding hopping double leg to home program  Exercises were reviewed and Ewa was able to demonstrate them prior to the end of the session.  Ewa demonstrated good  understanding of the education provided.      See EMR under Patient Instructions for exercises provided 9/18/2023.     Assessment     Ewa originally presented to PT status post labral repair. She has done well with progressing back to return to previous activities. She has no pain with functional tasks required for her job. She was educated on how to continue to improve her strength progression. Due to current status, no further skilled PT is required at this time. DC PT.     Plan of care discussed with patient: Continue with previous HEP  Patient's spiritual, cultural and educational needs considered and patient is agreeable to the plan of care and goals as stated below:      Anticipated Barriers for therapy: " none    Goals:   Short Term Goals (8 Weeks):  1. Pt will be compliant with initial HEP to supplement PT in restoring pain free function.  2. Pt will reduce worst pain to 4/10 in order to progress functional deficits  3. Pt will ambulate 1mile without pain in order to return to previous level of function  4. Pt will improve hip flex strength to 4/5 in order to improve functional mobility      Long Term Goals (16 Weeks):  1. Pt will improve FOTO score to </= 73% limited to decrease perceived limitation with mobility.   2. Pt will reduce worst pain to 1/10 in order to progress functional deficits  3. Pt will have hip strength greater than 90% of contralateral lower extremity in on order to return to previous level of function  4. Pt will have full hip range of motion compared to contralateral lower extremity in order to return to previous level of function.        Plan   DC PT    Audrey Joseph, PT

## 2024-04-30 ENCOUNTER — OFFICE VISIT (OUTPATIENT)
Dept: SPORTS MEDICINE | Facility: CLINIC | Age: 24
End: 2024-04-30
Payer: OTHER GOVERNMENT

## 2024-04-30 VITALS
HEIGHT: 66 IN | WEIGHT: 149.06 LBS | BODY MASS INDEX: 23.95 KG/M2 | DIASTOLIC BLOOD PRESSURE: 63 MMHG | HEART RATE: 63 BPM | SYSTOLIC BLOOD PRESSURE: 98 MMHG

## 2024-04-30 DIAGNOSIS — M25.851 FEMOROACETABULAR IMPINGEMENT OF RIGHT HIP: ICD-10-CM

## 2024-04-30 DIAGNOSIS — Z98.890 S/P HIP ARTHROSCOPY: Primary | ICD-10-CM

## 2024-04-30 DIAGNOSIS — S73.191D TEAR OF RIGHT ACETABULAR LABRUM, SUBSEQUENT ENCOUNTER: ICD-10-CM

## 2024-04-30 PROCEDURE — 99213 OFFICE O/P EST LOW 20 MIN: CPT | Mod: PBBFAC | Performed by: STUDENT IN AN ORGANIZED HEALTH CARE EDUCATION/TRAINING PROGRAM

## 2024-04-30 PROCEDURE — 99999 PR PBB SHADOW E&M-EST. PATIENT-LVL III: CPT | Mod: PBBFAC,,, | Performed by: STUDENT IN AN ORGANIZED HEALTH CARE EDUCATION/TRAINING PROGRAM

## 2024-04-30 PROCEDURE — 99213 OFFICE O/P EST LOW 20 MIN: CPT | Mod: S$PBB,,, | Performed by: STUDENT IN AN ORGANIZED HEALTH CARE EDUCATION/TRAINING PROGRAM

## 2024-04-30 NOTE — PROGRESS NOTES
Subjective:     Chief Complaint: Ewa Welch is a 23 y.o. female who had concerns including Pain of the Right Hip.    HPI    Ewa Welch is a 23 y.o. female presents status post below.  Overall she was doing well.  She is no real pain in the hip. She has been discharged from formal physical therapy and continues with her own exercise. She has started some occasional jogging with no issues. Denies any numbness or paresthesias.  Overall, she is pleased with her progress.    PROCEDURE PERFORMED by Leonard Becker MD on 09/15/2023:   Right arthroscopic acetabular labral repair  Right arthroscopic osteoplasty of cam lesion  Right arthroscopic capsular plication    Past Medical History:   Diagnosis Date    Generalized anxiety disorder 03/29/2016    Infected pilonidal cyst 4/5/2017    Seasonal allergic rhinitis due to pollen 4/4/2013    Self-harming behavior     Tonsillar hypertrophy 2/12/2018       Current Outpatient Medications on File Prior to Visit   Medication Sig Dispense Refill    fluticasone (FLONASE) 50 mcg/actuation nasal spray 1 spray (50 mcg total) by Each Nare route once daily. 16 g 11    ibuprofen (ADVIL,MOTRIN) 200 MG tablet Take 200 mg by mouth every 6 (six) hours as needed for Pain.      celecoxib (CELEBREX) 200 MG capsule Take 1 capsule (200 mg total) by mouth 2 (two) times daily with meals. (Patient not taking: Reported on 3/5/2024) 60 capsule 0    loratadine (CLARITIN) 10 mg tablet Take 1 tablet (10 mg total) by mouth once daily. (Patient taking differently: Take 10 mg by mouth daily as needed. ) 30 tablet 3    methocarbamoL (ROBAXIN) 500 MG Tab Take 1 tablet (500 mg total) by mouth 3 (three) times daily as needed (muscle spasms). (Patient not taking: Reported on 3/5/2024) 30 tablet 0    oxyCODONE (ROXICODONE) 5 MG immediate release tablet Take 1 tablet (5 mg total) by mouth every 6 (six) hours as needed for Pain. (Patient not taking: Reported on 9/28/2023) 28 tablet 0    promethazine (PHENERGAN) 25 MG  tablet Take 1 tablet (25 mg total) by mouth every 6 (six) hours as needed for Nausea. (Patient not taking: Reported on 12/7/2023) 30 tablet 0     No current facility-administered medications on file prior to visit.       Past Surgical History:   Procedure Laterality Date    ADENOIDECTOMY  02/12/2018    Dr. Bernard    ARTHROSCOPIC FEMOROPLASTY Right 9/15/2023    Procedure: FEMOROPLASTY, ARTHROSCOPIC;  Surgeon: Leonard Becker MD;  Location: [facility] OR;  Service: Orthopedics;  Laterality: Right;    ARTHROSCOPY, HIP Right 9/15/2023    Procedure: ARTHROSCOPY, HIP, WITH ACETABULOPLASTY, WITH REPAIR OF LABRUM ;  Surgeon: Leonard Becker MD;  Location: [facility] OR;  Service: Orthopedics;  Laterality: Right;  CAPSULE PLICATION    OTHER SURGICAL HISTORY Left     toenail removal big toe    TONSILLECTOMY  02/12/2018    Dr. Bernard    WISDOM TOOTH EXTRACTION Bilateral        Family History   Problem Relation Name Age of Onset    Allergies Mother      Eczema Sister      Cancer Paternal Grandmother      Cirrhosis Paternal Grandmother      No Known Problems Father         Social History     Socioeconomic History    Marital status:    Tobacco Use    Smoking status: Every Day     Types: Vaping with nicotine    Smokeless tobacco: Never   Substance and Sexual Activity    Alcohol use: Yes     Comment: Occasional    Drug use: Yes     Types: Marijuana     Comment: daily    Sexual activity: Yes     Partners: Female, Male   Social History Narrative    Lives with mom, dad and sister Erika.    She is planning to go to the [employer] next year.    Sexually relationship with female.               Review of Systems   Constitutional: Negative.   HENT: Negative.     Eyes: Negative.    Cardiovascular: Negative.    Respiratory: Negative.     Endocrine: Negative.    Hematologic/Lymphatic: Negative.    Skin: Negative.    Musculoskeletal:  Negative for falls, joint pain, joint swelling and muscle weakness.   Neurological: Negative.     Psychiatric/Behavioral: Negative.     Allergic/Immunologic: Negative.                    Objective:     General: Ewa is well-developed, well-nourished, appears stated age, in no acute distress, alert and oriented to time, place and person.     General    Nursing note and vitals reviewed.  Constitutional: She is oriented to person, place, and time. She appears well-developed and well-nourished. No distress.   HENT:   Head: Normocephalic and atraumatic.   Nose: Nose normal.   Eyes: EOM are normal.   Cardiovascular:  Intact distal pulses.            Pulmonary/Chest: Effort normal. No respiratory distress.   Neurological: She is alert and oriented to person, place, and time.   Psychiatric: She has a normal mood and affect. Her behavior is normal. Judgment and thought content normal.           Right Knee Exam     Inspection   Alignment:  normal  Effusion: absent    Right Hip Exam     Inspection   Scars: present  Swelling: absent  Bruising: absent  No deformity of hip.  Quadriceps Atrophy:  Negative  Erythema: absent    Range of Motion   Extension:  0   Flexion:  120   External rotation:  70   Internal rotation:  20     Tests   Pain w/ forced internal rotation (LIZETTE): absent  Pain w/ forced external rotation (FADIR): absent  Cynthia: negative  Stinchfield test: negative    Other   Sensation: normal      Muscle Strength   Right Lower Extremity   Hip Abduction: 5/5   Hip Adduction: 5/5   Hip Flexion: 5/5   Ankle Dorsiflexion:  5/5     Vascular Exam     Right Pulses  Dorsalis Pedis:      2+  Posterior Tibial:      2+        Edema  Right Upper Leg: absent          Assessment:   Ewa Welch is a 23 y.o. female status post above and doing well.  No diagnosis found.           Plan:     She is doing very well.  He will continue activities as tolerated.  Return to clinic in 4 months, that will be 1 year postoperative.      All of the patient's questions were answered. Patient was advised to call the clinic or contact me through  the patient portal for any questions or concerns.         Patient questionnaires may have been collected.

## 2024-09-10 ENCOUNTER — TELEPHONE (OUTPATIENT)
Dept: SPORTS MEDICINE | Facility: CLINIC | Age: 24
End: 2024-09-10
Payer: OTHER GOVERNMENT

## 2024-09-17 ENCOUNTER — OFFICE VISIT (OUTPATIENT)
Dept: SPORTS MEDICINE | Facility: CLINIC | Age: 24
End: 2024-09-17

## 2024-09-17 VITALS — WEIGHT: 149.06 LBS | BODY MASS INDEX: 23.95 KG/M2 | HEIGHT: 66 IN

## 2024-09-17 DIAGNOSIS — M25.851 FEMOROACETABULAR IMPINGEMENT OF RIGHT HIP: ICD-10-CM

## 2024-09-17 DIAGNOSIS — S73.191D TEAR OF RIGHT ACETABULAR LABRUM, SUBSEQUENT ENCOUNTER: Primary | ICD-10-CM

## 2024-09-17 DIAGNOSIS — Z98.890 S/P HIP ARTHROSCOPY: ICD-10-CM

## 2024-09-17 PROCEDURE — 99213 OFFICE O/P EST LOW 20 MIN: CPT | Mod: PBBFAC | Performed by: STUDENT IN AN ORGANIZED HEALTH CARE EDUCATION/TRAINING PROGRAM

## 2024-09-17 PROCEDURE — 99999 PR PBB SHADOW E&M-EST. PATIENT-LVL III: CPT | Mod: PBBFAC,,, | Performed by: STUDENT IN AN ORGANIZED HEALTH CARE EDUCATION/TRAINING PROGRAM

## 2024-09-17 NOTE — PROGRESS NOTES
Subjective:     Chief Complaint: Ewa Welch is a 24 y.o. female who had concerns including Follow-up of the Right Hip.    HPI    Ewa Welch is a 24 y.o. female presents 1 year status post below.  Overall she was doing well.  She is no real pain in the hip. She continues with her own exercise. Denies any numbness or paresthesias. She reports that her pre-operative symptoms have been resolved. Overall, she is pleased with her progress.    PROCEDURE PERFORMED by Leonard Becker MD on 09/15/2023:   Right arthroscopic acetabular labral repair  Right arthroscopic osteoplasty of cam lesion  Right arthroscopic capsular plication    Past Medical History:   Diagnosis Date    Generalized anxiety disorder 03/29/2016    Infected pilonidal cyst 4/5/2017    Seasonal allergic rhinitis due to pollen 4/4/2013    Self-harming behavior     Tonsillar hypertrophy 2/12/2018       Current Outpatient Medications on File Prior to Visit   Medication Sig Dispense Refill    fluticasone (FLONASE) 50 mcg/actuation nasal spray 1 spray (50 mcg total) by Each Nare route once daily. 16 g 11    ibuprofen (ADVIL,MOTRIN) 200 MG tablet Take 200 mg by mouth every 6 (six) hours as needed for Pain.      methocarbamoL (ROBAXIN) 500 MG Tab Take 1 tablet (500 mg total) by mouth 3 (three) times daily as needed (muscle spasms). 30 tablet 0    celecoxib (CELEBREX) 200 MG capsule Take 1 capsule (200 mg total) by mouth 2 (two) times daily with meals. (Patient not taking: Reported on 3/5/2024) 60 capsule 0    loratadine (CLARITIN) 10 mg tablet Take 1 tablet (10 mg total) by mouth once daily. (Patient taking differently: Take 10 mg by mouth daily as needed. ) 30 tablet 3    oxyCODONE (ROXICODONE) 5 MG immediate release tablet Take 1 tablet (5 mg total) by mouth every 6 (six) hours as needed for Pain. (Patient not taking: Reported on 9/28/2023) 28 tablet 0    promethazine (PHENERGAN) 25 MG tablet Take 1 tablet (25 mg total) by mouth every 6 (six) hours as needed for  Nausea. (Patient not taking: Reported on 12/7/2023) 30 tablet 0     No current facility-administered medications on file prior to visit.       Past Surgical History:   Procedure Laterality Date    ADENOIDECTOMY  02/12/2018    Dr. Bernard    ARTHROSCOPIC FEMOROPLASTY Right 9/15/2023    Procedure: FEMOROPLASTY, ARTHROSCOPIC;  Surgeon: Leonard Becker MD;  Location: [facility] OR;  Service: Orthopedics;  Laterality: Right;    ARTHROSCOPY, HIP Right 9/15/2023    Procedure: ARTHROSCOPY, HIP, WITH ACETABULOPLASTY, WITH REPAIR OF LABRUM ;  Surgeon: Leonard Becker MD;  Location: [facility] OR;  Service: Orthopedics;  Laterality: Right;  CAPSULE PLICATION    OTHER SURGICAL HISTORY Left     toenail removal big toe    TONSILLECTOMY  02/12/2018    Dr. Bernard    WISDOM TOOTH EXTRACTION Bilateral        Family History   Problem Relation Name Age of Onset    Allergies Mother      Eczema Sister      Cancer Paternal Grandmother      Cirrhosis Paternal Grandmother      No Known Problems Father         Social History     Socioeconomic History    Marital status:    Tobacco Use    Smoking status: Every Day     Types: Vaping with nicotine    Smokeless tobacco: Never   Substance and Sexual Activity    Alcohol use: Yes     Comment: Occasional    Drug use: Yes     Types: Marijuana     Comment: daily    Sexual activity: Yes     Partners: Female, Male   Social History Narrative    Lives with mom, dad and sister Erika.    She is planning to go to the [employer] next year.    Sexually relationship with female.             Social Determinants of Health     Financial Resource Strain: Low Risk  (9/16/2024)    Overall Financial Resource Strain (CARDIA)     Difficulty of Paying Living Expenses: Not very hard   Food Insecurity: No Food Insecurity (9/16/2024)    Hunger Vital Sign     Worried About Running Out of Food in the Last Year: Never true     Ran Out of Food in the Last Year: Never true   Physical Activity: Sufficiently Active (9/16/2024)     Exercise Vital Sign     Days of Exercise per Week: 7 days     Minutes of Exercise per Session: 90 min   Stress: Stress Concern Present (9/16/2024)    [employer] [place] [employer] - Occupational Stress Questionnaire     Feeling of Stress : To some extent   Housing Stability: Unknown (9/16/2024)    Housing Stability Vital Sign     Unable to Pay for Housing in the Last Year: No       Review of Systems   Constitutional: Negative.   HENT: Negative.     Eyes: Negative.    Cardiovascular: Negative.    Respiratory: Negative.     Endocrine: Negative.    Hematologic/Lymphatic: Negative.    Skin: Negative.    Musculoskeletal:  Negative for falls, joint pain, joint swelling and muscle weakness.   Neurological: Negative.    Psychiatric/Behavioral: Negative.     Allergic/Immunologic: Negative.                    Objective:     General: Ewa is well-developed, well-nourished, appears stated age, in no acute distress, alert and oriented to time, place and person.     General    Nursing note and vitals reviewed.  Constitutional: She is oriented to person, place, and time. She appears well-developed and well-nourished. No distress.   HENT:   Head: Normocephalic and atraumatic.   Nose: Nose normal.   Eyes: EOM are normal.   Cardiovascular:  Intact distal pulses.            Pulmonary/Chest: Effort normal. No respiratory distress.   Neurological: She is alert and oriented to person, place, and time.   Psychiatric: She has a normal mood and affect. Her behavior is normal. Judgment and thought content normal.           Right Knee Exam     Inspection   Alignment:  normal  Effusion: absent    Right Hip Exam     Inspection   Scars: present  Swelling: absent  Bruising: absent  No deformity of hip.  Quadriceps Atrophy:  Negative  Erythema: absent    Range of Motion   Extension:  0   Flexion:  120   External rotation:  70   Internal rotation:  20     Tests   Pain w/ forced internal rotation (LIZETTE): absent  Pain w/ forced external  rotation (FADIR): absent  Cynthia: negative  Stinchfield test: negative    Other   Sensation: normal      Muscle Strength   Right Lower Extremity   Hip Abduction: 5/5   Hip Adduction: 5/5   Hip Flexion: 5/5   Ankle Dorsiflexion:  5/5     Vascular Exam     Right Pulses  Dorsalis Pedis:      2+  Posterior Tibial:      2+        Edema  Right Upper Leg: absent          Assessment:   Ewa Welch is a 24 y.o. female status post above and doing well.  Encounter Diagnoses   Name Primary?    Tear of right acetabular labrum, subsequent encounter Yes    Femoroacetabular impingement of right hip     S/P hip arthroscopy               Plan:     She was doing very well.  Continue activities as tolerated.  Return to clinic on an as-needed basis.      All of the patient's questions were answered. Patient was advised to call the clinic or contact me through the patient portal for any questions or concerns.         Patient questionnaires may have been collected.

## 2024-11-04 ENCOUNTER — TELEPHONE (OUTPATIENT)
Dept: SPORTS MEDICINE | Facility: CLINIC | Age: 24
End: 2024-11-04

## (undated) DEVICE — CATH ALL PUR URTHL RR 10FR

## (undated) DEVICE — APPLICATOR CHLORAPREP ORN 26ML

## (undated) DEVICE — CAPSULESTITCH SUTURE PASSER

## (undated) DEVICE — BNDG COFLEX FOAM LF2 ST 3X5YD

## (undated) DEVICE — ELECTRODE REM PLYHSV RETURN 9

## (undated) DEVICE — DRAPE INCISE IOBAN 2 23X17IN

## (undated) DEVICE — BLADE SAMURAI CURVED

## (undated) DEVICE — Device

## (undated) DEVICE — MAT SURGICAL ECOSUCTIONER

## (undated) DEVICE — SUT FIBERLINK TAPE BLU WHT 1.3

## (undated) DEVICE — TUBING SUC UNIV W/CONN 12FT

## (undated) DEVICE — DRAPE TOP 53X102IN

## (undated) DEVICE — DRAPE ISOLATION 2ML

## (undated) DEVICE — SUT VICRYL PLUS 3-0 SH 18IN

## (undated) DEVICE — PAD ELECTRODE STER 1.5X3

## (undated) DEVICE — GOWN ECLIPSE REINF L4 XLNG XXL

## (undated) DEVICE — DRAPE C-ARM ELAS CLIP 42X120IN

## (undated) DEVICE — KIT FIBERTAK CURVED SPEAR 1.8M

## (undated) DEVICE — GLOVE BIOGEL SKINSENSE PI 7.5

## (undated) DEVICE — BRACE T-SCOPE HIP SMALL RIGHT

## (undated) DEVICE — KIT ANTIFOG

## (undated) DEVICE — TOWEL OR DISP STRL BLUE 4/PK

## (undated) DEVICE — WRAPON POLAR PAD HIP FOR POLAR

## (undated) DEVICE — DRAPE STERI LONG

## (undated) DEVICE — KIT PORTAL ENTRY

## (undated) DEVICE — SEE MEDLINE ITEM 157117

## (undated) DEVICE — PASSER SUTURE LOOP TACK SWIFT

## (undated) DEVICE — PROBE ARTHSCP EDGE ENERGY 50

## (undated) DEVICE — DRESSING AQUACEL AG SILVER 4X4

## (undated) DEVICE — TUBE SET INFLOW/OUTFLOW

## (undated) DEVICE — UNDERGLOVES BIOGEL PI SIZE 8

## (undated) DEVICE — SUT SUTURETAPE TIGERLINK 1.3MM

## (undated) DEVICE — SHAVER BUR PEAR 6MMX19CM

## (undated) DEVICE — BLADE SHAVER ARTHRO 4.2X19CM

## (undated) DEVICE — DRAPE STERI-DRAPE 1000 17X11IN

## (undated) DEVICE — HDS DISPOSABLE PAD KIT

## (undated) DEVICE — PENCIL ROCKER SWITCH 10FT CORD

## (undated) DEVICE — SEE MEDLINE ITEM 152496

## (undated) DEVICE — SUCTION COAGULATOR 10FR 6IN

## (undated) DEVICE — UNDERGLOVES BIOGEL PI SIZE 8.5

## (undated) DEVICE — CANNULA TRIM IT CUS HIP

## (undated) DEVICE — BLADE SHAVER PREBENT 4.2MM

## (undated) DEVICE — SUT 3-0 ETHILON 18 FS-1

## (undated) DEVICE — CANNULA FLOWPORT OBTURATOR